# Patient Record
Sex: FEMALE | Race: WHITE | NOT HISPANIC OR LATINO | Employment: OTHER | URBAN - METROPOLITAN AREA
[De-identification: names, ages, dates, MRNs, and addresses within clinical notes are randomized per-mention and may not be internally consistent; named-entity substitution may affect disease eponyms.]

---

## 2017-01-19 ENCOUNTER — HISTORICAL (OUTPATIENT)
Dept: ADMINISTRATIVE | Facility: HOSPITAL | Age: 72
End: 2017-01-19

## 2017-01-19 LAB
ALBUMIN SERPL-MCNC: 3.9 G/DL (ref 3.1–4.7)
ALP SERPL-CCNC: 85 IU/L (ref 40–104)
ALT (SGPT): 18 IU/L (ref 3–33)
AST SERPL-CCNC: 22 IU/L (ref 10–40)
BASOPHILS NFR BLD: 0.1 K/UL (ref 0–0.2)
BASOPHILS NFR BLD: 0.7 %
BILIRUB SERPL-MCNC: 0.7 MG/DL (ref 0.3–1)
BUN SERPL-MCNC: 13 MG/DL (ref 8–20)
CALCIUM SERPL-MCNC: 8.8 MG/DL (ref 7.7–10.4)
CHLORIDE: 102 MMOL/L (ref 98–110)
CO2 SERPL-SCNC: 27.6 MMOL/L (ref 22.8–31.6)
CREATININE: 0.55 MG/DL (ref 0.6–1.4)
CRP SERPL-MCNC: 0.16 MG/DL (ref 0–1.4)
EOSINOPHIL NFR BLD: 0.2 K/UL (ref 0–0.7)
EOSINOPHIL NFR BLD: 3.1 %
ERYTHROCYTE [DISTWIDTH] IN BLOOD BY AUTOMATED COUNT: 12.4 % (ref 11.7–14.9)
GLUCOSE: 143 MG/DL (ref 70–99)
GRAN #: 3.6 K/UL (ref 1.4–6.5)
GRAN%: 52.7 %
HCT VFR BLD AUTO: 42.6 % (ref 36–48)
HGB BLD-MCNC: 13.8 G/DL (ref 12–15)
IMMATURE GRANS (ABS): 0 K/UL (ref 0–1)
IMMATURE GRANULOCYTES: 0.3 %
LYMPH #: 2.4 K/UL (ref 1.2–3.4)
LYMPH%: 35.1 %
MCH RBC QN AUTO: 29.8 PG (ref 25–35)
MCHC RBC AUTO-ENTMCNC: 32.4 G/DL (ref 31–36)
MCV RBC AUTO: 92 FL (ref 79–98)
MONO #: 0.6 K/UL (ref 0.1–0.6)
MONO%: 8.1 %
NUCLEATED RBCS: 0 %
PLATELET # BLD AUTO: 200 K/UL (ref 140–440)
PMV BLD AUTO: 10.7 FL (ref 8.8–12.7)
POTASSIUM SERPL-SCNC: 3.7 MMOL/L (ref 3.5–5)
PROT SERPL-MCNC: 7.6 G/DL (ref 6–8.2)
RBC # BLD AUTO: 4.63 M/UL (ref 3.5–5.5)
SODIUM: 139 MMOL/L (ref 134–144)
WBC # BLD AUTO: 6.8 K/UL (ref 5–10)

## 2017-02-06 DIAGNOSIS — J30.9 CHRONIC ALLERGIC RHINITIS: ICD-10-CM

## 2017-02-07 RX ORDER — MONTELUKAST SODIUM 10 MG/1
10 TABLET ORAL NIGHTLY
Qty: 90 TABLET | Refills: 1 | Status: SHIPPED | OUTPATIENT
Start: 2017-02-07 | End: 2019-04-10 | Stop reason: SDUPTHER

## 2017-02-13 ENCOUNTER — HISTORICAL (OUTPATIENT)
Dept: ADMINISTRATIVE | Facility: HOSPITAL | Age: 72
End: 2017-02-13

## 2017-02-17 DIAGNOSIS — J31.0 RHINITIS, UNSPECIFIED TYPE: ICD-10-CM

## 2017-02-17 RX ORDER — FLUTICASONE PROPIONATE 50 MCG
2 SPRAY, SUSPENSION (ML) NASAL DAILY
Qty: 48 G | Refills: 3 | Status: SHIPPED | OUTPATIENT
Start: 2017-02-17

## 2017-02-23 ENCOUNTER — PATIENT MESSAGE (OUTPATIENT)
Dept: FAMILY MEDICINE | Facility: CLINIC | Age: 72
End: 2017-02-23

## 2017-03-07 ENCOUNTER — DOCUMENTATION ONLY (OUTPATIENT)
Dept: FAMILY MEDICINE | Facility: CLINIC | Age: 72
End: 2017-03-07

## 2017-03-07 NOTE — PROGRESS NOTES
Pre-Visit Chart Review  For Appointment Scheduled on 3-8-17    Health Maintenance Due   Topic Date Due    TETANUS VACCINE  10/31/1962    Zoster Vaccine  10/31/2004    Pneumococcal (65+) (2 of 2 - PCV13) 10/04/2013

## 2017-03-08 ENCOUNTER — OFFICE VISIT (OUTPATIENT)
Dept: FAMILY MEDICINE | Facility: CLINIC | Age: 72
End: 2017-03-08
Payer: MEDICARE

## 2017-03-08 VITALS
WEIGHT: 205.69 LBS | DIASTOLIC BLOOD PRESSURE: 70 MMHG | SYSTOLIC BLOOD PRESSURE: 148 MMHG | HEART RATE: 63 BPM | BODY MASS INDEX: 40.38 KG/M2 | TEMPERATURE: 98 F | HEIGHT: 60 IN

## 2017-03-08 DIAGNOSIS — R53.83 FATIGUE, UNSPECIFIED TYPE: ICD-10-CM

## 2017-03-08 DIAGNOSIS — Z23 NEED FOR VACCINATION WITH 13-POLYVALENT PNEUMOCOCCAL CONJUGATE VACCINE: ICD-10-CM

## 2017-03-08 DIAGNOSIS — Z13.29 THYROID DISORDER SCREEN: ICD-10-CM

## 2017-03-08 DIAGNOSIS — E55.9 VITAMIN D DEFICIENCY: ICD-10-CM

## 2017-03-08 DIAGNOSIS — E78.5 HYPERLIPIDEMIA, UNSPECIFIED HYPERLIPIDEMIA TYPE: ICD-10-CM

## 2017-03-08 DIAGNOSIS — Z83.49 FAMILY HISTORY OF THYROID DISEASE: ICD-10-CM

## 2017-03-08 DIAGNOSIS — I10 ESSENTIAL HYPERTENSION: Primary | ICD-10-CM

## 2017-03-08 DIAGNOSIS — M06.9 RHEUMATOID ARTHRITIS, INVOLVING UNSPECIFIED SITE, UNSPECIFIED RHEUMATOID FACTOR PRESENCE: ICD-10-CM

## 2017-03-08 PROCEDURE — 99214 OFFICE O/P EST MOD 30 MIN: CPT | Mod: S$PBB,,, | Performed by: FAMILY MEDICINE

## 2017-03-08 PROCEDURE — 99213 OFFICE O/P EST LOW 20 MIN: CPT | Mod: PBBFAC,PO,25 | Performed by: FAMILY MEDICINE

## 2017-03-08 PROCEDURE — 99999 PR PBB SHADOW E&M-EST. PATIENT-LVL III: CPT | Mod: PBBFAC,,, | Performed by: FAMILY MEDICINE

## 2017-03-08 PROCEDURE — 90670 PCV13 VACCINE IM: CPT | Mod: PBBFAC,PO | Performed by: FAMILY MEDICINE

## 2017-03-08 RX ORDER — TIZANIDINE 4 MG/1
TABLET ORAL
COMMUNITY
Start: 2017-01-19 | End: 2017-09-07

## 2017-03-08 RX ORDER — CHOLECALCIFEROL (VITAMIN D3) 125 MCG
5000 CAPSULE ORAL DAILY
COMMUNITY

## 2017-03-08 RX ORDER — CHLORTHALIDONE 25 MG/1
25 TABLET ORAL DAILY
Qty: 90 TABLET | Refills: 3 | Status: SHIPPED | OUTPATIENT
Start: 2017-03-08 | End: 2017-03-23

## 2017-03-08 RX ORDER — GARLIC 1000 MG
CAPSULE ORAL
COMMUNITY
End: 2018-03-08

## 2017-03-08 NOTE — MR AVS SNAPSHOT
[facility]  [address]  [place] [address]  Phone: [phone]  Fax: [phone]                  Ling Rapp   3/8/2017 2:00 PM   Office Visit    Description:  Female : 1945   Provider:  Rosa M Chan MD   Department:  [facility]           Reason for Visit     Establish Care           Diagnoses this Visit        Comments    Essential hypertension    -  Primary     Obesity, Class II, BMI 35-39.9, with comorbidity         Hyperlipidemia, unspecified hyperlipidemia type         Vitamin D deficiency         Rheumatoid arthritis, involving unspecified site, unspecified rheumatoid factor presence         Family history of thyroid disease         Thyroid disorder screen         Fatigue, unspecified type         Need for vaccination with 13-polyvalent pneumococcal conjugate vaccine                To Do List           Future Appointments        Provider Department Dept Phone    3/9/2017 8:45 AM [facility] [phone]    3/14/2017 9:45 AM [facility] MAMMO1 [facility] [phone]    3/14/2017 10:15 AM [facility] US2 [facility] [phone]    3/20/2017 11:20 AM Rosa M Chan MD [facility] [phone]    2017 8:40 AM Rosa M Chan MD [facility] [phone]      Goals (5 Years of Data)     None      Follow-Up and Disposition     Return in about 6 months (around 2017).       These Medications        Disp Refills Start End    chlorthalidone (HYGROTEN) 25 MG Tab 90 tablet 3 3/8/2017 3/8/2018    Take 1 tablet (25 mg total) by mouth once daily. - Oral    Pharmacy: [facility] - [place], [place] - [address] Ph #: [phone]         [facility]     [facility] -  Assistance  Registered nurses in the [facility] provide clinical advisement, health education, appointment booking, and other  advisory services.  Call for this free service at [phone].             Medications           Message regarding Medications     Verify the changes and/or additions to your medication regime listed below are the same as discussed with your clinician today.  If any of these changes or additions are incorrect, please notify your healthcare provider.        START taking these NEW medications        Refills    chlorthalidone (HYGROTEN) 25 MG Tab 3    Sig: Take 1 tablet (25 mg total) by mouth once daily.    Class: Normal    Route: Oral      STOP taking these medications     hydrochlorothiazide (MICROZIDE) 12.5 mg capsule TAKE 1 CAPSULE IN THE MORNING FOR FLUID           Verify that the below list of medications is an accurate representation of the medications you are currently taking.  If none reported, the list may be blank. If incorrect, please contact your healthcare provider. Carry this list with you in case of emergency.           Current Medications     albuterol (PROAIR HFA) 90 mcg/actuation inhaler Inhale 2 puffs into the lungs every 6 (six) hours as needed for Wheezing.    artificial saliva, yerbas-lyt, SprA Use as directed    aspirin 81 mg Tab Every day    atenolol (TENORMIN) 100 MG tablet TAKE 1 TABLET DAILY    azelastine (ASTELIN) 137 mcg nasal spray 1 spray by Nasal route 2 (two) times daily.    beclomethasone (QVAR) 80 mcg/actuation Aero Inhale 1 puff into the lungs 2 (two) times daily.    CALCIUM CARBONATE/VITAMIN D3 (OS-ADDY 500 + D) 500-125 mg-unit Tab Twice a day    cetirizine (ZYRTEC) 10 MG tablet Every day    cholecalciferol, vitamin D3, 5,000 unit capsule Take 5,000 Units by mouth once daily. 5 a week    cyanocobalamin (VITAMIN B-12) 100 MCG tablet Take 100 mcg by mouth once daily.    diclofenac sodium (VOLTAREN) 1 % Gel Apply 2 g topically once daily.    etanercept (ENBREL) 50 mg/mL (0.98 mL) injection once weekly    fluticasone (FLONASE) 50 mcg/actuation nasal spray 2 sprays by Each Nare route  once daily. Every day    garlic (GARLIC OIL) 1,000 mg Cap Take by mouth.    Lactobacillus rhamnosus GG (CULTURELLE) 10 billion cell capsule Take 1 capsule by mouth once daily.    montelukast (SINGULAIR) 10 mg tablet Take 1 tablet (10 mg total) by mouth every evening.    omeprazole (PRILOSEC) 40 MG capsule TAKE 1 CAPSULE TWICE DAILY BEFORE MEALS    salsalate (DISALCID) 500 MG tablet 500 mg daily as needed. Twice a day    tizanidine (ZANAFLEX) 4 MG tablet     tramadol (ULTRAM) 50 mg tablet Take 50 mg by mouth every 6 (six) hours as needed.     UBIDECARENONE (COQ-10 ORAL) Take by mouth.    chlorthalidone (HYGROTEN) 25 MG Tab Take 1 tablet (25 mg total) by mouth once daily.    triamcinolone acetonide 0.5% (KENALOG) 0.5 % Crea Apply topically 2 (two) times daily.           Clinical Reference Information           Your Vitals Were     BP Pulse Temp Height Weight BMI    148/70 (BP Location: Right arm, Patient Position: Sitting, BP Method: Manual) 63 97.7 °F (36.5 °C) (Oral) 5' (1.524 m) 93.3 kg (205 lb 11 oz) 40.17 kg/m2      Blood Pressure          Most Recent Value    BP  (!)  148/70      Allergies as of 3/8/2017     Amoxicillin-pot Clavulanate    Diltiazem    Fenofibrate    Hydroxychloroquine    Leflunomide    Lisinopril-hydrochlorothiazide    Methotrexate    Norvasc [Amlodipine]    Sulfa (Sulfonamide Antibiotics)      Immunizations Administered on Date of Encounter - 3/8/2017     Name Date Dose VIS Date Route    Pneumococcal Conjugate - 13 Valent 3/8/2017 0.5 mL 11/5/2015 Intramuscular      Orders Placed During Today's Visit      Normal Orders This Visit    Pneumococcal Conjugate Vaccine (13 Valent) (IM)     Future Labs/Procedures Expected by Expires    CBC auto differential  3/9/2017 (Approximate) 6/8/2017    Comprehensive metabolic panel  3/9/2017 (Approximate) 6/8/2017    Lipid panel  3/9/2017 (Approximate) 6/8/2017    TSH  3/9/2017 (Approximate) 6/8/2017    Vitamin D  3/9/2017 (Approximate) 6/8/2017       Instructions      Walking for Fitness  Fitness walking has something for everyone, even people who are already fit. Walking is one of the safest ways to condition your body aerobically. It can boost energy, help you lose weight, and reduce stress.    Physical benefits  · Walking strengthens your heart and lungs, and tones your muscles.  · When walking, your feet land with less impact than in other sports. This reduces chances of muscle, bone, and joint injury.  · Regular walking improves your cholesterol levels and lowers your risk of heart disease. And it helps you control your blood sugar if you have diabetes.  · Walking is a weight-bearing activity, which helps maintain bone density. This can help prevent osteoporosis.  Personal rewards  · Taking walks can help you relax and manage stress. And fitness walking may make you feel better about yourself.  · Walking can help you sleep better at night and make you less likely to be depressed.  · Regular walking may help maintain your memory as you get older.  · Walking is a great way to spend extra time with friends and family members. Be sure to invite your dog along!  Q&A about fitness walking  Q: Will walking keep me fit?  A: Yes. Regular walking at the right pace gives you all the benefits of other aerobic activities, such as jogging and swimming.  Q: Will walking help me lose weight and keep it off?  A: Yes. Per mile, walking can burn as many calories as jogging. Your health care provider can help work walking into your weight-loss plan.  Q: Is walking safe for my health?  A: Yes. Walking is safe if you have high blood pressure, diabetes, heart disease, or other conditions. Talk to your health care provider before you start.  Date Last Reviewed: 5/9/2015  © 1968-2088 [employer]. [address] [place] [address], [place] [address]. All rights reserved. This information is not intended as a substitute for professional medical care. Always follow your  healthcare professional's instructions.        Weight Management: Getting Started  Healthy bodies come in all shapes and sizes. Not all bodies are made to be thin. For some people, a healthy weight is higher than the average weight listed on weight charts. Your healthcare provider can help you decide on a healthy weight for you.    Reasons to lose weight  Losing weight can help with some health problems, such as high blood pressure, heart disease, diabetes, sleep apnea, and arthritis. You may also feel more energy.  Set your long-term goal  Your goal doesn't even have to be a specific weight. You may decide on a fitness goal (such as being able to walk 10 miles a week), or a health goal (such as lowering your blood pressure). Choose a goal that is measurable and reasonable, so you know when you've reached it. A goal of reaching a BMI of less than 25 is not always reasonable (or possible).   Make an action plan  Habits dont change overnight. Setting your goals too high can leave you feeling discouraged if you cant reach them. Be realistic. Choose one or two small changes you can make now. Set an action plan for how you are going to make these changes. When you can stick to this plan, keep making a few more small changes. Taking small steps will help you stay on the path to success.  Track your progress  Write down your goals. Then, keep a daily record of your progress. Write down what you eat and how active you are. This record lets you look back on how much youve done. It may also help when youre feeling frustrated. Reward yourself for success. Even if you dont reach every goal, give yourself credit for what you do get done.  Get support  Encouragement from others can help make losing weight easier. Ask your family members and friends for support. They may even want to join you. Also look to your healthcare provider, registered dietitian, and  for help. Your local hospital can give you more  information about nutrition, exercise, and weight loss.  Date Last Reviewed: 1/31/2016  © 4515-0064 [employer]. [address] [place] [address], [place] [address]. All rights reserved. This information is not intended as a substitute for professional medical care. Always follow your healthcare professional's instructions.             Language Assistance Services     ATTENTION: Language assistance services are available, free of charge. Please call [phone].      ATENCIÓN: Si habla annamarie, tiene a mar disposición servicios gratuitos de asistencia lingüística. Llame al [phone].     CHÚ Ý: N?u b?n nói Ti?ng Vi?t, có các d?ch v? h? tr? ngôn ng? mi?n phí dành cho b?n. G?i s? [phone].         [facility] complies with applicable Federal civil rights laws and does not discriminate on the basis of race, color, national origin, age, disability, or sex.

## 2017-03-08 NOTE — PATIENT INSTRUCTIONS

## 2017-03-08 NOTE — NURSING
2 patient identifiers used (name & ). Administered Prevnar vaccine IM. Patient tolerated well, no bleeding at insertion site noted. Pain scale 0/10. Aseptic technique maintained. Vaccine information given to patient.

## 2017-03-09 ENCOUNTER — LAB VISIT (OUTPATIENT)
Dept: LAB | Facility: HOSPITAL | Age: 72
End: 2017-03-09
Attending: FAMILY MEDICINE
Payer: MEDICARE

## 2017-03-09 DIAGNOSIS — Z83.49 FAMILY HISTORY OF THYROID DISEASE: ICD-10-CM

## 2017-03-09 DIAGNOSIS — E78.5 HYPERLIPIDEMIA, UNSPECIFIED HYPERLIPIDEMIA TYPE: ICD-10-CM

## 2017-03-09 DIAGNOSIS — Z13.29 THYROID DISORDER SCREEN: ICD-10-CM

## 2017-03-09 DIAGNOSIS — M06.9 RHEUMATOID ARTHRITIS, INVOLVING UNSPECIFIED SITE, UNSPECIFIED RHEUMATOID FACTOR PRESENCE: ICD-10-CM

## 2017-03-09 DIAGNOSIS — R53.83 FATIGUE, UNSPECIFIED TYPE: ICD-10-CM

## 2017-03-09 DIAGNOSIS — E55.9 VITAMIN D DEFICIENCY: ICD-10-CM

## 2017-03-09 LAB
25(OH)D3+25(OH)D2 SERPL-MCNC: 27 NG/ML
ALBUMIN SERPL BCP-MCNC: 3.7 G/DL
ALP SERPL-CCNC: 94 U/L
ALT SERPL W/O P-5'-P-CCNC: 17 U/L
ANION GAP SERPL CALC-SCNC: 9 MMOL/L
AST SERPL-CCNC: 22 U/L
BASOPHILS # BLD AUTO: 0.05 K/UL
BASOPHILS NFR BLD: 0.6 %
BILIRUB SERPL-MCNC: 0.9 MG/DL
BUN SERPL-MCNC: 19 MG/DL
CALCIUM SERPL-MCNC: 9.3 MG/DL
CHLORIDE SERPL-SCNC: 105 MMOL/L
CHOLEST/HDLC SERPL: 4.8 {RATIO}
CO2 SERPL-SCNC: 26 MMOL/L
CREAT SERPL-MCNC: 0.8 MG/DL
DIFFERENTIAL METHOD: ABNORMAL
EOSINOPHIL # BLD AUTO: 0.3 K/UL
EOSINOPHIL NFR BLD: 3.3 %
ERYTHROCYTE [DISTWIDTH] IN BLOOD BY AUTOMATED COUNT: 13.4 %
EST. GFR  (AFRICAN AMERICAN): >60 ML/MIN/1.73 M^2
EST. GFR  (NON AFRICAN AMERICAN): >60 ML/MIN/1.73 M^2
GLUCOSE SERPL-MCNC: 96 MG/DL
HCT VFR BLD AUTO: 45.5 %
HDL/CHOLESTEROL RATIO: 20.9 %
HDLC SERPL-MCNC: 201 MG/DL
HDLC SERPL-MCNC: 42 MG/DL
HGB BLD-MCNC: 14.1 G/DL
LDLC SERPL CALC-MCNC: 103 MG/DL
LYMPHOCYTES # BLD AUTO: 3 K/UL
LYMPHOCYTES NFR BLD: 36.2 %
MCH RBC QN AUTO: 28.8 PG
MCHC RBC AUTO-ENTMCNC: 31 %
MCV RBC AUTO: 93 FL
MONOCYTES # BLD AUTO: 0.8 K/UL
MONOCYTES NFR BLD: 9.2 %
NEUTROPHILS # BLD AUTO: 4.1 K/UL
NEUTROPHILS NFR BLD: 50.5 %
NONHDLC SERPL-MCNC: 159 MG/DL
PLATELET # BLD AUTO: 196 K/UL
PMV BLD AUTO: 10.7 FL
POTASSIUM SERPL-SCNC: 4.4 MMOL/L
PROT SERPL-MCNC: 8 G/DL
RBC # BLD AUTO: 4.9 M/UL
SODIUM SERPL-SCNC: 140 MMOL/L
TRIGL SERPL-MCNC: 280 MG/DL
TSH SERPL DL<=0.005 MIU/L-ACNC: 1.5 UIU/ML
WBC # BLD AUTO: 8.16 K/UL

## 2017-03-09 PROCEDURE — 80061 LIPID PANEL: CPT

## 2017-03-09 PROCEDURE — 82306 VITAMIN D 25 HYDROXY: CPT

## 2017-03-09 PROCEDURE — 80053 COMPREHEN METABOLIC PANEL: CPT

## 2017-03-09 PROCEDURE — 84443 ASSAY THYROID STIM HORMONE: CPT

## 2017-03-09 PROCEDURE — 85025 COMPLETE CBC W/AUTO DIFF WBC: CPT

## 2017-03-09 PROCEDURE — 36415 COLL VENOUS BLD VENIPUNCTURE: CPT | Mod: PO

## 2017-03-13 NOTE — PROGRESS NOTES
Subjective:       Patient ID: Ling Rapp is a 71 y.o. female.    Chief Complaint: Establish Care    Patient Active Problem List   Diagnosis    Left foot pain    Vitamin D deficiency    Hyperlipidemia    HTN (hypertension)    Rheumatoid arthritis    Asthma    Chronic allergic rhinitis    Obesity, Class II, BMI 35-39.9, with comorbidity    Bronchial breath sound    Congestive atelectasis    Venous insufficiency    Rectal bleeding    Lung nodule   Dr. Schultz is treating neck pain.  Had c spine xray [facility] 2/13/17 showed arthritis amd MRI 2/21/17 showed multi level DDD and fusion C4-5 .  Having tension headahces and shoulder pain keeping her awake at night.  See neurology Dr. Sheffield 3/20.  IS attending PT and transitioning to home exercises.    Dr. Devries is monitoring pulm nodule    HPI  Review of Systems   Constitutional: Negative for fatigue and unexpected weight change.   Respiratory: Negative for chest tightness and shortness of breath.    Cardiovascular: Negative for chest pain, palpitations and leg swelling.   Gastrointestinal: Negative for abdominal pain.   Musculoskeletal: Positive for arthralgias, neck pain and neck stiffness.   Neurological: Negative for dizziness, syncope, light-headedness and headaches.       Objective:      Physical Exam   Constitutional: She is oriented to person, place, and time. She appears well-developed and well-nourished.   Cardiovascular: Normal rate, regular rhythm and normal heart sounds.    Pulmonary/Chest: Effort normal and breath sounds normal.   Musculoskeletal: She exhibits no edema.   Neurological: She is alert and oriented to person, place, and time.   Skin: Skin is warm and dry.   Psychiatric: She has a normal mood and affect.   Nursing note and vitals reviewed.      Assessment:       1. Essential hypertension    2. Obesity, Class II, BMI 35-39.9, with comorbidity    3. Hyperlipidemia, unspecified hyperlipidemia type    4. Vitamin D deficiency    5.  Rheumatoid arthritis, involving unspecified site, unspecified rheumatoid factor presence    6. Family history of thyroid disease    7. Thyroid disorder screen    8. Fatigue, unspecified type    9. Need for vaccination with 13-polyvalent pneumococcal conjugate vaccine        Plan:       1. Essential hypertension  Uncontrolled. I counseled the patient on HTN education, management and recommendations.  I recommended weight loss toward a BMI < 25, avoidance of salt and the DASH diet, regular cardio exercise a minimum of 150 minutes per week and medications if indicated.  Printed materials were given.      2. Obesity, Class II, BMI 35-39.9, with comorbidity      3. Hyperlipidemia, unspecified hyperlipidemia type  Counseled patient on his ideal body weight, health consequences of being obese and current recommendations including weekly exercise and a heart healthy diet.  Current BMI is:Estimated body mass index is 40.17 kg/(m^2) as calculated from the following:    Height as of this encounter: 5' (1.524 m).    Weight as of this encounter: 93.3 kg (205 lb 11 oz)..  Patient is aware that ideal BMI < 25 or Weight in (lb) to have BMI = 25: 127.7.      - Comprehensive metabolic panel; Future  - Lipid panel; Future    4. Vitamin D deficiency  Screen and treat as indicated:    - Vitamin D; Future    5. Rheumatoid arthritis, involving unspecified site, unspecified rheumatoid factor presence  Cont rheum care and meds.  Cont treatment for neck ortho issues with pt and neurology  - CBC auto differential; Future    6. Family history of thyroid disease  Screen and treat as indicated:    - TSH; Future    7. Thyroid disorder screen  Screen and treat as indicated:    - TSH; Future    8. Fatigue, unspecified type  Screen and treat as indicated:    - TSH; Future    9. Need for vaccination with 13-polyvalent pneumococcal conjugate vaccine  Immunize today.  Counseled patient on risks, benefits and side effects.  Patient elected to proceed  with vaccination.    [facility] goal documentation:  Patient readiness: acceptance and barriers:readiness  During the course of the visit the patient was educated and counseled about the following: Hypertension:   Dietary sodium restriction.  Regular aerobic exercise.  Obesity:   General weight loss/lifestyle modification strategies discussed (elicit support from others; identify saboteurs; non-food rewards, etc).  Goals: Hypertension: Reduce Blood Pressure and Obesity: Reduce calorie intake and BMI  Goal/Outcomes met:none  The following self management tools provided:none  Patient Instructions (the written plan) was given to the patient/family: Yes  Time spent with patient: 40 minutes    Patient with be reevaluated in 6 months or sooner prn.  Re[eat BP check nurse visit 3/14/17    Greater than 50% of this visit was spent counseling as described in above documentation:Yes      - Pneumococcal Conjugate Vaccine (13 Valent) (IM)

## 2017-03-14 ENCOUNTER — TELEPHONE (OUTPATIENT)
Dept: FAMILY MEDICINE | Facility: CLINIC | Age: 72
End: 2017-03-14

## 2017-03-14 ENCOUNTER — TELEPHONE (OUTPATIENT)
Dept: RADIOLOGY | Facility: HOSPITAL | Age: 72
End: 2017-03-14

## 2017-03-14 ENCOUNTER — HOSPITAL ENCOUNTER (OUTPATIENT)
Dept: RADIOLOGY | Facility: HOSPITAL | Age: 72
Discharge: HOME | End: 2017-03-14
Attending: NURSE PRACTITIONER
Payer: MEDICARE

## 2017-03-14 DIAGNOSIS — R92.8 ABNORMAL MAMMOGRAM: ICD-10-CM

## 2017-03-14 PROCEDURE — 77061 BREAST TOMOSYNTHESIS UNI: CPT | Mod: 26,LT,, | Performed by: RADIOLOGY

## 2017-03-14 PROCEDURE — 76642 ULTRASOUND BREAST LIMITED: CPT | Mod: TC,LT

## 2017-03-14 PROCEDURE — 76642 ULTRASOUND BREAST LIMITED: CPT | Mod: 26,LT,, | Performed by: RADIOLOGY

## 2017-03-14 PROCEDURE — 77061 BREAST TOMOSYNTHESIS UNI: CPT | Mod: TC,LT

## 2017-03-14 PROCEDURE — 77065 DX MAMMO INCL CAD UNI: CPT | Mod: 26,LT,, | Performed by: RADIOLOGY

## 2017-03-14 NOTE — TELEPHONE ENCOUNTER
----- Message from Kimberly Simmons sent at 3/14/2017 11:08 AM CDT -----  Contact:   call //[phone]   dr barber   Calling   To  Speak to  mara  About   Pt  ,    Was  Informed  Nurse  Sarika,  Stated   mara  wasn't t  In  Today , so  Doctor  Wanted a  Call  When   She  Returned

## 2017-03-14 NOTE — TELEPHONE ENCOUNTER
..Patient notified of diagnostic mammogram results Needs left breast biopsy appointment is scheduled with Dr. Mcguire on 3/16/2017.

## 2017-03-16 ENCOUNTER — OFFICE VISIT (OUTPATIENT)
Dept: SURGERY | Facility: CLINIC | Age: 72
End: 2017-03-16
Payer: MEDICARE

## 2017-03-16 VITALS — TEMPERATURE: 98 F | BODY MASS INDEX: 39.96 KG/M2 | WEIGHT: 204.56 LBS

## 2017-03-16 DIAGNOSIS — R92.8 ABNORMAL MAMMOGRAM: Primary | ICD-10-CM

## 2017-03-16 PROCEDURE — 99999 PR PBB SHADOW E&M-EST. PATIENT-LVL II: CPT | Mod: PBBFAC,,, | Performed by: SURGERY

## 2017-03-16 PROCEDURE — 99204 OFFICE O/P NEW MOD 45 MIN: CPT | Mod: S$PBB,,, | Performed by: SURGERY

## 2017-03-16 PROCEDURE — 99212 OFFICE O/P EST SF 10 MIN: CPT | Mod: PBBFAC,PO | Performed by: SURGERY

## 2017-03-16 RX ORDER — LANOLIN ALCOHOL/MO/W.PET/CERES
400 CREAM (GRAM) TOPICAL DAILY
COMMUNITY

## 2017-03-16 NOTE — LETTER
March 19, 2017      [facility]  [address]  [place] [address]           [facility] [facility]  [address]  [place] [address]  Phone: [phone]          Patient: Ling Rapp   MR Number: [id number]   YOB: 1945   Date of Visit: 3/16/2017       Dear [facility]:    Thank you for referring Ling Rapp to me for evaluation. Attached you will find relevant portions of my assessment and plan of care.    If you have questions, please do not hesitate to call me. I look forward to following Ling Rapp along with you.    Sincerely,    Mani Mcguire MD    Enclosure  CC:  No Recipients    If you would like to receive this communication electronically, please contact [email or web address] or [phone] to request more information on [employer] access.    For providers and/or their staff who would like to refer a patient to [facility], please contact us through our one-stop-shop provider referral line, [facility], at [phone].    If you feel you have received this communication in error or would no longer like to receive these types of communications, please e-mail [email or web address]

## 2017-03-19 NOTE — PROGRESS NOTES
Subjective:       Patient ID: Ling Rapp is a 71 y.o. female.    Chief Complaint: Consult (left breast biopsy)    HPI 71-year-old female presents for evaluation for a left breast biopsy.  The patient says she is sensitive breast but otherwise has had no symptoms.  She's had no previous biopsies.  She is not sure if she can feel the mass.  Family history is negative for breast or ovarian cancer.    Review of Systems   Constitutional: Negative for activity change, chills, fever and unexpected weight change.   HENT: Negative for congestion, hearing loss, sore throat and voice change.    Eyes: Negative.    Respiratory: Negative for cough, shortness of breath and wheezing.    Cardiovascular: Negative for chest pain and palpitations.   Gastrointestinal: Negative for abdominal pain, blood in stool, nausea and vomiting.   Genitourinary: Negative for dysuria, frequency and hematuria.   Musculoskeletal: Negative for arthralgias, back pain and neck pain.   Skin: Negative for color change and wound.   Allergic/Immunologic: Negative.    Neurological: Negative for dizziness, weakness and headaches.   Hematological: Negative for adenopathy. Does not bruise/bleed easily.   Psychiatric/Behavioral: Negative for confusion and dysphoric mood. The patient is not nervous/anxious.      Objective:     Physical Exam   Constitutional: She is oriented to person, place, and time. She appears well-developed and well-nourished. No distress.   HENT:   Head: Normocephalic and atraumatic.   Eyes: Conjunctivae and EOM are normal. Pupils are equal, round, and reactive to light. No scleral icterus.   Neck: Normal range of motion. Neck supple. No thyromegaly present.   Cardiovascular: Normal rate, regular rhythm, normal heart sounds and intact distal pulses.    No murmur heard.  Pulmonary/Chest: No stridor. No respiratory distress. She has no wheezes. She has no rales.   Breast exam: There is no palpable dominant mass.  There is some mild  tenderness.  There is no nipple discharge.  There is no palpable axillary adenopathy.  There are no overlying skin changes.   Abdominal: She exhibits no distension and no mass. There is no tenderness.   Musculoskeletal: Normal range of motion. She exhibits no edema.   Lymphadenopathy:     She has no cervical adenopathy.   Neurological: She is alert and oriented to person, place, and time. No cranial nerve deficit. She exhibits normal muscle tone.   Skin: Skin is warm and dry. No rash noted. No erythema.   Psychiatric: She has a normal mood and affect. Her behavior is normal. Judgment normal.     Mammogram and ultrasound images and reports reviewed.    Assessment:     Encounter Diagnosis   Name Primary?    Abnormal mammogram Yes       Plan:      1.  Plan ultrasound-guided biopsy of this left breast mass.  2. Risks and benefits of the planned procedure were discussed at length with the patient.  Risks and benefits of not proceeding with the procedure were discussed as well. All questions were answered. The patient expressed clear understanding and would like to proceed with the procedure as discussed.

## 2017-03-20 ENCOUNTER — CLINICAL SUPPORT (OUTPATIENT)
Dept: FAMILY MEDICINE | Facility: CLINIC | Age: 72
End: 2017-03-20
Payer: MEDICARE

## 2017-03-20 ENCOUNTER — TELEPHONE (OUTPATIENT)
Dept: FAMILY MEDICINE | Facility: CLINIC | Age: 72
End: 2017-03-20

## 2017-03-20 VITALS — DIASTOLIC BLOOD PRESSURE: 62 MMHG | SYSTOLIC BLOOD PRESSURE: 142 MMHG

## 2017-03-20 DIAGNOSIS — I10 ESSENTIAL HYPERTENSION: Primary | ICD-10-CM

## 2017-03-20 NOTE — PROGRESS NOTES
Patient here today for a BP recheck. Was to have stopped HCTZ 12.5 mg daily and started chlorthalidone 25 mg daily on 3/8/17  Per Dr Chan but did not do this. Patient stated that after that appointment she realized she had not been taking HCTZ for approximately 2 months so she decided to simply restart this medication instead of the chlorthalidone. In addition, the chlorthalidone was $77 per month which she cannot afford.  BP readings today were 158/66 and after resting 5 minutes 142/62. Both readings were done manually. Telephone message was sent ms Billyar to address.

## 2017-03-20 NOTE — TELEPHONE ENCOUNTER
Patient came in today for a BP recheck. Was to have stopped HCTZ 12.5 mg daily and started chlorthalidone 25 mg daily on 3/8/17 per Dr Chan but did not do this. Patient stated that after that appointment she realized she had not been taking HCTZ for approximately 2 months so she decided to simply restart this medication instead of the chlorthalidone. In addition, the chlorthalidone was $77 per month which she cannot afford.  BP readings today were 158/66 and after resting 5 minutes 142/62. Both readings were done manually.  Please advise.

## 2017-03-23 NOTE — TELEPHONE ENCOUNTER
Advised patient to increase hctz to 25 mg daily instead and I removed chlorthalidone from the medication list. Please call her tomorrow (Friday) and schedule nursing BP check in 2 weeks.

## 2017-03-30 ENCOUNTER — HOSPITAL ENCOUNTER (OUTPATIENT)
Dept: RADIOLOGY | Facility: HOSPITAL | Age: 72
Discharge: HOME | End: 2017-03-30
Attending: SURGERY
Payer: MEDICARE

## 2017-03-30 DIAGNOSIS — R92.8 ABNORMAL MAMMOGRAM: ICD-10-CM

## 2017-03-30 DIAGNOSIS — R91.1 LUNG NODULE: Primary | ICD-10-CM

## 2017-03-30 PROCEDURE — 19083 BX BREAST 1ST LESION US IMAG: CPT

## 2017-03-30 PROCEDURE — 88305 TISSUE EXAM BY PATHOLOGIST: CPT | Mod: 26,,, | Performed by: PATHOLOGY

## 2017-03-30 PROCEDURE — 19083 BX BREAST 1ST LESION US IMAG: CPT | Mod: LT,,, | Performed by: SURGERY

## 2017-03-30 PROCEDURE — 88305 TISSUE EXAM BY PATHOLOGIST: CPT | Performed by: PATHOLOGY

## 2017-03-30 RX ORDER — LIDOCAINE HYDROCHLORIDE AND EPINEPHRINE 10; 10 MG/ML; UG/ML
5 INJECTION, SOLUTION INFILTRATION; PERINEURAL ONCE
Status: DISCONTINUED | OUTPATIENT
Start: 2017-03-30 | End: 2017-03-31 | Stop reason: HOSPADM

## 2017-03-30 RX ORDER — LIDOCAINE HYDROCHLORIDE AND EPINEPHRINE 10; 10 MG/ML; UG/ML
INJECTION, SOLUTION INFILTRATION; PERINEURAL
Status: DISPENSED
Start: 2017-03-30 | End: 2017-03-30

## 2017-03-30 NOTE — DISCHARGE SUMMARY
Discharge Summary  General Surgery      Admit Date: 3/30/2017    Discharge Date :3/30/2017    Attending Physician: Mani Mcguire     Discharge Physician: Mani Mcguire    Discharged Condition: good    Discharge Diagnosis: Left breast mass    Treatments/Procedures: US guided left breast biopsy    Hospital Course: Uneventful; Discharged home.    Significant Diagnostic Studies: none    Disposition: Home or Self Care    Diet: Regular    Follow up: Office 10-14 days    Activity: No heavy lifting till seen in office.    Patient Instructions:   Patient's Medications   New Prescriptions    No medications on file   Previous Medications    ALBUTEROL (PROAIR HFA) 90 MCG/ACTUATION INHALER    Inhale 2 puffs into the lungs every 6 (six) hours as needed for Wheezing.    ARTIFICIAL SALIVA, YERBAS-LYT, SPRA    Use as directed    ASPIRIN 81 MG TAB    Every day    ATENOLOL (TENORMIN) 100 MG TABLET    TAKE 1 TABLET DAILY    AZELASTINE (ASTELIN) 137 MCG NASAL SPRAY    1 spray by Nasal route 2 (two) times daily.    BECLOMETHASONE (QVAR) 80 MCG/ACTUATION AERO    Inhale 1 puff into the lungs 2 (two) times daily.    CALCIUM CARBONATE/VITAMIN D3 (OS-ADDY 500 + D) 500-125 MG-UNIT TAB    Twice a day    CETIRIZINE (ZYRTEC) 10 MG TABLET    Every day    CHOLECALCIFEROL, VITAMIN D3, 5,000 UNIT CAPSULE    Take 5,000 Units by mouth once daily. 5 a week    CYANOCOBALAMIN (VITAMIN B-12) 100 MCG TABLET    Take 100 mcg by mouth once daily.    DICLOFENAC SODIUM (VOLTAREN) 1 % GEL    Apply 2 g topically once daily.    ETANERCEPT (ENBREL) 50 MG/ML (0.98 ML) INJECTION    once weekly    FLUTICASONE (FLONASE) 50 MCG/ACTUATION NASAL SPRAY    2 sprays by Each Nare route once daily. Every day    GARLIC (GARLIC OIL) 1,000 MG CAP    Take by mouth.    LACTOBACILLUS RHAMNOSUS GG (CULTURELLE) 10 BILLION CELL CAPSULE    Take 1 capsule by mouth once daily.    MAGNESIUM OXIDE (MAG-OX) 400 MG TABLET    Take 400 mg by mouth once daily.    MONTELUKAST (SINGULAIR) 10 MG  TABLET    Take 1 tablet (10 mg total) by mouth every evening.    OMEPRAZOLE (PRILOSEC) 40 MG CAPSULE    TAKE 1 CAPSULE TWICE DAILY BEFORE MEALS    SALSALATE (DISALCID) 500 MG TABLET    500 mg daily as needed. Twice a day    TIZANIDINE (ZANAFLEX) 4 MG TABLET        TRAMADOL (ULTRAM) 50 MG TABLET    Take 50 mg by mouth every 6 (six) hours as needed.     TRIAMCINOLONE ACETONIDE 0.5% (KENALOG) 0.5 % CREA    Apply topically 2 (two) times daily.    UBIDECARENONE (COQ-10 ORAL)    Take by mouth.   Modified Medications    No medications on file   Discontinued Medications    No medications on file       No discharge procedures on file.

## 2017-03-31 ENCOUNTER — DOCUMENTATION ONLY (OUTPATIENT)
Dept: FAMILY MEDICINE | Facility: CLINIC | Age: 72
End: 2017-03-31

## 2017-03-31 NOTE — PROGRESS NOTES
Pre-Visit Chart Review  For Appointment Scheduled on 04/07/2017    Health Maintenance Due   Topic Date Due    TETANUS VACCINE  10/31/1962    Zoster Vaccine  10/31/2004

## 2017-03-31 NOTE — PATIENT INSTRUCTIONS
Controlling High Blood Pressure  High blood pressure (hypertension) is often called the silent killer. This is because many people who have it dont know it. High blood pressure is defined as 140/90 mm Hg or higher. Know your blood pressure and remember to check it regularly. Doing so can save your life. Here are some things you can do to help control your blood pressure.    Choose heart-healthy foods  · Select low-salt, low-fat foods. Limit sodium intake to 2,400 mg per day or the amount suggested by your healthcare provider.  · Limit canned, dried, cured, packaged, and fast foods. These can contain a lot of salt.  · Eat 8 to 10 servings of fruits and vegetables every day.  · Choose lean meats, fish, or chicken.  · Eat whole-grain pasta, brown rice, and beans.  · Eat 2 to 3 servings of low-fat or fat-free dairy products.  · Ask your doctor about the DASH eating plan. This plan helps reduce blood pressure.  · When you go to a restaurant, ask that your meal be prepared with no added salt.  Maintain a healthy weight  · Ask your healthcare provider how many calories to eat a day. Then stick to that number.  · Ask your healthcare provider what weight range is healthiest for you. If you are overweight, a weight loss of only 3% to 5% of your body weight can help lower blood pressure. Generally, a good weight loss goal is to lose 10% of your body weight in a year.  · Limit snacks and sweets.  · Get regular exercise.  Get up and get active  · Choose activities you enjoy. Find ones you can do with friends or family. This includes bicycling, dancing, walking, and jogging.  · Park farther away from building entrances.  · Use stairs instead of the elevator.  · When you can, walk or bike instead of driving.  · [place] leaves, garden, or do household repairs.  · Be active at a moderate to vigorous level of physical activity for at least 40 minutes for a minimum of 3 to 4 days a week.   Manage stress  · Make time to relax and enjoy  life. Find time to laugh.  · Communicate your concerns with your loved ones and your healthcare provider.  · Visit with family and friends, and keep up with hobbies.  Limit alcohol and quit smoking  · Men should have no more than 2 drinks per day.  · Women should have no more than 1 drink per day.  · Talk with your healthcare provider about quitting smoking. Smoking significantly increases your risk for heart disease and stroke. Ask your healthcare provider about community smoking cessation programs and other options.  Medicines  If lifestyle changes arent enough, your healthcare provider may prescribe high blood pressure medicine. Take all medicines as prescribed. If you have any questions about your medicines, ask your healthcare provider before stopping or changing them.   Date Last Reviewed: 4/27/2016 © 2000-2016 [employer]. [address] [place] [address], [place] [address]. All rights reserved. This information is not intended as a substitute for professional medical care. Always follow your healthcare professional's instructions.        Weight Management: Getting Started  Healthy bodies come in all shapes and sizes. Not all bodies are made to be thin. For some people, a healthy weight is higher than the average weight listed on weight charts. Your healthcare provider can help you decide on a healthy weight for you.    Reasons to lose weight  Losing weight can help with some health problems, such as high blood pressure, heart disease, diabetes, sleep apnea, and arthritis. You may also feel more energy.  Set your long-term goal  Your goal doesn't even have to be a specific weight. You may decide on a fitness goal (such as being able to walk 10 miles a week), or a health goal (such as lowering your blood pressure). Choose a goal that is measurable and reasonable, so you know when you've reached it. A goal of reaching a BMI of less than 25 is not always reasonable (or possible).   Make an action  plan  Habits dont change overnight. Setting your goals too high can leave you feeling discouraged if you cant reach them. Be realistic. Choose one or two small changes you can make now. Set an action plan for how you are going to make these changes. When you can stick to this plan, keep making a few more small changes. Taking small steps will help you stay on the path to success.  Track your progress  Write down your goals. Then, keep a daily record of your progress. Write down what you eat and how active you are. This record lets you look back on how much youve done. It may also help when youre feeling frustrated. Reward yourself for success. Even if you dont reach every goal, give yourself credit for what you do get done.  Get support  Encouragement from others can help make losing weight easier. Ask your family members and friends for support. They may even want to join you. Also look to your healthcare provider, registered dietitian, and  for help. Your local hospital can give you more information about nutrition, exercise, and weight loss.  Date Last Reviewed: 1/31/2016 © 2000-2016 [employer]. [address] [place] [address]. All rights reserved. This information is not intended as a substitute for professional medical care. Always follow your healthcare professional's instructions.        Walking for Fitness  Fitness walking has something for everyone, even people who are already fit. Walking is one of the safest ways to condition your body aerobically. It can boost energy, help you lose weight, and reduce stress.    Physical benefits  · Walking strengthens your heart and lungs, and tones your muscles.  · When walking, your feet land with less impact than in other sports. This reduces chances of muscle, bone, and joint injury.  · Regular walking improves your cholesterol levels and lowers your risk of heart disease. And it helps you control your blood sugar if  you have diabetes.  · Walking is a weight-bearing activity, which helps maintain bone density. This can help prevent osteoporosis.  Personal rewards  · Taking walks can help you relax and manage stress. And fitness walking may make you feel better about yourself.  · Walking can help you sleep better at night and make you less likely to be depressed.  · Regular walking may help maintain your memory as you get older.  · Walking is a great way to spend extra time with friends and family members. Be sure to invite your dog along!  Q&A about fitness walking  Q: Will walking keep me fit?  A: Yes. Regular walking at the right pace gives you all the benefits of other aerobic activities, such as jogging and swimming.  Q: Will walking help me lose weight and keep it off?  A: Yes. Per mile, walking can burn as many calories as jogging. Your health care provider can help work walking into your weight-loss plan.  Q: Is walking safe for my health?  A: Yes. Walking is safe if you have high blood pressure, diabetes, heart disease, or other conditions. Talk to your health care provider before you start.  Date Last Reviewed: 5/9/2015  © 2216-5003 [employer]. [address] [place] [address], [place] [address]. All rights reserved. This information is not intended as a substitute for professional medical care. Always follow your healthcare professional's instructions.

## 2017-04-06 ENCOUNTER — TELEPHONE (OUTPATIENT)
Dept: RADIOLOGY | Facility: HOSPITAL | Age: 72
End: 2017-04-06

## 2017-04-06 NOTE — TELEPHONE ENCOUNTER
..Patient notified of breast biopsy results.     FINAL PATHOLOGIC DIAGNOSIS  LEFT BREAST, 3:00 CM FROM NIPPLE, BIOPSY:  -Benign breast tissue with fibrocystic changes and usual ductal hyperplasia.  -No atypia or malignancy.  -Microcalcifications are present.    Instructed to repeat diagnostic mammogram in 3 months

## 2017-04-07 ENCOUNTER — CLINICAL SUPPORT (OUTPATIENT)
Dept: FAMILY MEDICINE | Facility: CLINIC | Age: 72
End: 2017-04-07
Payer: MEDICARE

## 2017-04-07 ENCOUNTER — TELEPHONE (OUTPATIENT)
Dept: FAMILY MEDICINE | Facility: CLINIC | Age: 72
End: 2017-04-07

## 2017-04-07 VITALS — DIASTOLIC BLOOD PRESSURE: 62 MMHG | SYSTOLIC BLOOD PRESSURE: 150 MMHG | HEART RATE: 66 BPM

## 2017-04-07 DIAGNOSIS — I10 ESSENTIAL HYPERTENSION: Primary | ICD-10-CM

## 2017-04-07 PROCEDURE — 99999 PR PBB SHADOW E&M-EST. PATIENT-LVL III: CPT | Mod: PBBFAC,,, | Performed by: PHYSICIAN ASSISTANT

## 2017-04-07 PROCEDURE — 99213 OFFICE O/P EST LOW 20 MIN: CPT | Mod: PBBFAC,PO | Performed by: PHYSICIAN ASSISTANT

## 2017-04-07 NOTE — PROGRESS NOTES
2 patient identifiers used ( name &  ).  Patient came in for nurse blood pressure check.  Automatic reading was 157/68  . Right arm resting on desk, feet flat on floor, back straight.  Patient had no c/o pain.  Patient stated that hey took their prescribed blood pressure medication this am.  Allergies and medication reviewed with the patient.  Blood pressure re-checked after 5 minutes with manual cuff, reading was 150/62 .  Patient advised to continue taking medication as prescribed and follow up as scheduled.  Patient advised that message will be sent to the provider for recommendations and we will call with the reply.

## 2017-04-20 ENCOUNTER — OFFICE VISIT (OUTPATIENT)
Dept: SURGERY | Facility: CLINIC | Age: 72
End: 2017-04-20
Payer: MEDICARE

## 2017-04-20 ENCOUNTER — HISTORICAL (OUTPATIENT)
Dept: ADMINISTRATIVE | Facility: HOSPITAL | Age: 72
End: 2017-04-20

## 2017-04-20 VITALS — TEMPERATURE: 98 F

## 2017-04-20 DIAGNOSIS — R92.8 ABNORMAL MAMMOGRAM: Primary | ICD-10-CM

## 2017-04-20 LAB
ALBUMIN SERPL-MCNC: 3.8 G/DL (ref 3.1–4.7)
ALP SERPL-CCNC: 83 IU/L (ref 40–104)
ALT (SGPT): 18 IU/L (ref 3–33)
AST SERPL-CCNC: 19 IU/L (ref 10–40)
BASOPHILS NFR BLD: 0.1 K/UL (ref 0–0.2)
BASOPHILS NFR BLD: 0.7 %
BILIRUB SERPL-MCNC: 1 MG/DL (ref 0.3–1)
BUN SERPL-MCNC: 14 MG/DL (ref 8–20)
CALCIUM SERPL-MCNC: 8.8 MG/DL (ref 7.7–10.4)
CHLORIDE: 99 MMOL/L (ref 98–110)
CO2 SERPL-SCNC: 28.2 MMOL/L (ref 22.8–31.6)
CREATININE: 0.58 MG/DL (ref 0.6–1.4)
CRP SERPL-MCNC: 0.16 MG/DL (ref 0–1.4)
EOSINOPHIL NFR BLD: 0.2 K/UL (ref 0–0.7)
EOSINOPHIL NFR BLD: 2.6 %
ERYTHROCYTE [DISTWIDTH] IN BLOOD BY AUTOMATED COUNT: 12.8 % (ref 11.7–14.9)
GLUCOSE: 99 MG/DL (ref 70–99)
GRAN #: 4.6 K/UL (ref 1.4–6.5)
GRAN%: 53 %
HCT VFR BLD AUTO: 43.3 % (ref 36–48)
HGB BLD-MCNC: 14.3 G/DL (ref 12–15)
IMMATURE GRANS (ABS): 0 K/UL (ref 0–1)
IMMATURE GRANULOCYTES: 0.2 %
LYMPH #: 2.8 K/UL (ref 1.2–3.4)
LYMPH%: 32.8 %
MCH RBC QN AUTO: 29.7 PG (ref 25–35)
MCHC RBC AUTO-ENTMCNC: 33 G/DL (ref 31–36)
MCV RBC AUTO: 89.8 FL (ref 79–98)
MONO #: 0.9 K/UL (ref 0.1–0.6)
MONO%: 10.7 %
NUCLEATED RBCS: 0 %
PLATELET # BLD AUTO: 205 K/UL (ref 140–440)
PMV BLD AUTO: 10.2 FL (ref 8.8–12.7)
POTASSIUM SERPL-SCNC: 3.5 MMOL/L (ref 3.5–5)
PROT SERPL-MCNC: 7.9 G/DL (ref 6–8.2)
RBC # BLD AUTO: 4.82 M/UL (ref 3.5–5.5)
SODIUM: 136 MMOL/L (ref 134–144)
WBC # BLD AUTO: 8.6 K/UL (ref 5–10)

## 2017-04-20 PROCEDURE — 99999 PR PBB SHADOW E&M-EST. PATIENT-LVL II: CPT | Mod: PBBFAC,,, | Performed by: SURGERY

## 2017-04-20 PROCEDURE — 99024 POSTOP FOLLOW-UP VISIT: CPT | Mod: ,,, | Performed by: SURGERY

## 2017-04-20 PROCEDURE — 99212 OFFICE O/P EST SF 10 MIN: CPT | Mod: PBBFAC,PO | Performed by: SURGERY

## 2017-04-21 NOTE — PROGRESS NOTES
POST-OP NOTE    DATE OF PROCEDURE: 3/30/17    PROCEDURE: Left breast biopsy    COMPLAINTS: None.    EXAM: Incision well approximated. No drainage. No infection.      IMPRESSION: FINAL PATHOLOGIC DIAGNOSIS  LEFT BREAST, 3:00 CM FROM NIPPLE, BIOPSY:  -Benign breast tissue with fibrocystic changes and usual ductal hyperplasia.  -No atypia or malignancy.  -Microcalcifications are present.    PLAN: FU as needed.  3 month mammogram.

## 2017-07-20 ENCOUNTER — OFFICE VISIT (OUTPATIENT)
Dept: RHEUMATOLOGY | Facility: CLINIC | Age: 72
End: 2017-07-20
Payer: MEDICARE

## 2017-07-20 VITALS
DIASTOLIC BLOOD PRESSURE: 61 MMHG | BODY MASS INDEX: 41.23 KG/M2 | WEIGHT: 210 LBS | HEIGHT: 60 IN | SYSTOLIC BLOOD PRESSURE: 145 MMHG

## 2017-07-20 DIAGNOSIS — M05.79 SEROPOSITIVE RHEUMATOID ARTHRITIS OF MULTIPLE SITES: Primary | ICD-10-CM

## 2017-07-20 LAB
ALBUMIN SERPL-MCNC: 3.9 G/DL (ref 3.1–4.7)
ALP SERPL-CCNC: 73 IU/L (ref 40–104)
ALT (SGPT): 19 IU/L (ref 3–33)
AST SERPL-CCNC: 18 IU/L (ref 10–40)
BASOPHILS NFR BLD: 0.1 K/UL (ref 0–0.2)
BASOPHILS NFR BLD: 0.8 %
BILIRUB SERPL-MCNC: 0.8 MG/DL (ref 0.3–1)
BUN SERPL-MCNC: 16 MG/DL (ref 8–20)
CALCIUM SERPL-MCNC: 8.7 MG/DL (ref 7.7–10.4)
CHLORIDE: 105 MMOL/L (ref 98–110)
CO2 SERPL-SCNC: 29.3 MMOL/L (ref 22.8–31.6)
CREATININE: 0.53 MG/DL (ref 0.6–1.4)
CRP SERPL-MCNC: 0.17 MG/DL (ref 0–1.4)
EOSINOPHIL NFR BLD: 0.3 K/UL (ref 0–0.7)
EOSINOPHIL NFR BLD: 3.7 %
ERYTHROCYTE [DISTWIDTH] IN BLOOD BY AUTOMATED COUNT: 12.8 % (ref 11.7–14.9)
GLUCOSE: 133 MG/DL (ref 70–99)
GRAN #: 4 K/UL (ref 1.4–6.5)
GRAN%: 52.9 %
HCT VFR BLD AUTO: 42.1 % (ref 36–48)
HGB BLD-MCNC: 13.8 G/DL (ref 12–15)
IMMATURE GRANS (ABS): 0 K/UL (ref 0–1)
IMMATURE GRANULOCYTES: 0.3 %
LYMPH #: 2.3 K/UL (ref 1.2–3.4)
LYMPH%: 30.6 %
MCH RBC QN AUTO: 30.5 PG (ref 25–35)
MCHC RBC AUTO-ENTMCNC: 32.8 G/DL (ref 31–36)
MCV RBC AUTO: 93.1 FL (ref 79–98)
MONO #: 0.9 K/UL (ref 0.1–0.6)
MONO%: 11.7 %
NUCLEATED RBCS: 0 %
PLATELET # BLD AUTO: 182 K/UL (ref 140–440)
PMV BLD AUTO: 10.4 FL (ref 8.8–12.7)
POTASSIUM SERPL-SCNC: 4 MMOL/L (ref 3.5–5)
PROT SERPL-MCNC: 7.5 G/DL (ref 6–8.2)
RBC # BLD AUTO: 4.52 M/UL (ref 3.5–5.5)
SODIUM: 139 MMOL/L (ref 134–144)
WBC # BLD AUTO: 7.6 K/UL (ref 5–10)

## 2017-07-20 PROCEDURE — 1125F AMNT PAIN NOTED PAIN PRSNT: CPT | Mod: ,,, | Performed by: INTERNAL MEDICINE

## 2017-07-20 PROCEDURE — 1159F MED LIST DOCD IN RCRD: CPT | Mod: ,,, | Performed by: INTERNAL MEDICINE

## 2017-07-20 PROCEDURE — 99213 OFFICE O/P EST LOW 20 MIN: CPT | Mod: ,,, | Performed by: INTERNAL MEDICINE

## 2017-07-20 NOTE — PROGRESS NOTES
[facility] RHEUMATOLOGY            PROGRESS NOTE      Subjective:       Patient ID:   NAME: Ling Rapp : 1945     71 y.o. female    Referring Doc: No ref. provider found  Other Physicians:    Chief Complaint:  Rheumatoid Arthritis (follow-up no changes since last visit)      History of Present Illness:     Patient returns today for a regularly scheduled follow-up visit for Rheumatoid arthritis      The patient is doing well. No prolonged morning stiffness or fatigue. No joint swelling.  Main complaint is that of neck pain which been treated by her pain management physician with recent epidural injections.            ROS:   GEN:  No  fever, night sweats . weight is stable   No fatigue  SKIN: no rashes, no bruising, no ulcerations, no Raynaud's  HEENT: no HA's, No visual changes, no mucosal ulcers, no sicca symptoms,  CV:   no CP,occ SOB, PND, SEN, no orthopnea, no palpitations  PULM: normal with occ  SOB, and cough treated with inhalers, no hemoptysis, sputum or pleuritic pain  GI:  no abdominal pain, nausea, vomiting, constipation, diarrhea, melanotic stools, BRBPR, hematemesis, no dysphagia  :   no dysuria  NEURO: no paresthesias, headaches, visual disturbances, muscle weakness  MUSCULOSKELETAL:no joint swelling, prolonged AM stiffness, no back pain, no muscle pain  Allergies:  Review of patient's allergies indicates:   Allergen Reactions    Amoxicillin-pot clavulanate Diarrhea and Other (See Comments)     Sever cramping    Diltiazem Other (See Comments)     Extreme dry moth    Fenofibrate Other (See Comments)     Other reaction(s): Itching    Hydroxychloroquine      Other reaction(s): eye accomodation problems    Leflunomide      Other reaction(s): abd pains    Lisinopril-hydrochlorothiazide      Other reaction(s): diarrhea  Other reaction(s): cramps    Methotrexate      Other reaction(s): pancreatitis    Norvasc [amlodipine] Swelling    Sulfa (sulfonamide antibiotics)      Other  reaction(s): Swelling  Other reaction(s): Hives    Sulfamethoxazole-trimethoprim        Medications:    Current Outpatient Prescriptions:     albuterol (PROAIR HFA) 90 mcg/actuation inhaler, Inhale 2 puffs into the lungs every 6 (six) hours as needed for Wheezing., Disp: 3 Inhaler, Rfl: 5    artificial saliva, yerbas-lyt, SprA, Use as directed, Disp: 240 mL, Rfl: 11    aspirin 81 mg Tab, Every day, Disp: , Rfl:     atenolol (TENORMIN) 100 MG tablet, TAKE 1 TABLET DAILY, Disp: 90 tablet, Rfl: 2    azelastine (ASTELIN) 137 mcg nasal spray, 1 spray by Nasal route 2 (two) times daily., Disp: , Rfl:     CALCIUM CARBONATE/VITAMIN D3 (OS-ADDY 500 + D) 500-125 mg-unit Tab, Twice a day, Disp: , Rfl:     cetirizine (ZYRTEC) 10 MG tablet, Every day, Disp: , Rfl:     cholecalciferol, vitamin D3, 5,000 unit capsule, Take 5,000 Units by mouth once daily. 5 a week, Disp: , Rfl:     cyanocobalamin (VITAMIN B-12) 100 MCG tablet, Take 100 mcg by mouth once daily., Disp: , Rfl:     diclofenac sodium (VOLTAREN) 1 % Gel, Apply 2 g topically once daily., Disp: , Rfl:     etanercept (ENBREL) 50 mg/mL (0.98 mL) injection, once weekly, Disp: , Rfl:     fluticasone (FLONASE) 50 mcg/actuation nasal spray, 2 sprays by Each Nare route once daily. Every day, Disp: 48 g, Rfl: 3    garlic (GARLIC OIL) 1,000 mg Cap, Take by mouth., Disp: , Rfl:     Lactobacillus rhamnosus GG (CULTURELLE) 10 billion cell capsule, Take 1 capsule by mouth once daily., Disp: , Rfl:     magnesium oxide (MAG-OX) 400 mg tablet, Take 400 mg by mouth once daily., Disp: , Rfl:     montelukast (SINGULAIR) 10 mg tablet, Take 1 tablet (10 mg total) by mouth every evening., Disp: 90 tablet, Rfl: 1    omeprazole (PRILOSEC) 40 MG capsule, TAKE 1 CAPSULE TWICE DAILY BEFORE MEALS, Disp: 180 capsule, Rfl: 2    salsalate (DISALCID) 500 MG tablet, 500 mg daily as needed. Twice a day, Disp: , Rfl:     tizanidine (ZANAFLEX) 4 MG tablet, , Disp: , Rfl:     tramadol  (ULTRAM) 50 mg tablet, Take 50 mg by mouth every 6 (six) hours as needed. , Disp: , Rfl:     UBIDECARENONE (COQ-10 ORAL), Take by mouth., Disp: , Rfl:     beclomethasone (QVAR) 80 mcg/actuation Aero, Inhale 1 puff into the lungs 2 (two) times daily., Disp: 26.1 g, Rfl: 3    triamcinolone acetonide 0.5% (KENALOG) 0.5 % Crea, Apply topically 2 (two) times daily., Disp: 15 g, Rfl: 2    PMHx/PSHx Updates:  Last TB Gold.January 2017: negative    Objective:     Vitals:  Blood pressure (!) 145/61, height 5' (1.524 m), weight 95.3 kg (210 lb).    Physical Examination:   GEN: no apparent distress, comfortable; AAOx3  SKIN: no rashes,no ulceration, no Raynaud's, no petechiae, no SQ nodules,  HEAD: normal  EYES: no pallor, no icterus,NECK: no masses, thyroid normal, trachea midline, no LAD/LN's, supple  CV: RRR with no murmur; l S1 and S2 reg. ,no gallop no rubs,   CHEST: Normal respiratory effort; CTAB; normal breath sounds; no wheeze or crackles  ABDOM: nontender and nondistended; soft; no masses; no rebound/guarding  MUSC/Skeletal: ROM normal; no crepitus; joints without synovitis,  Heberden's and Irvin's.  No joint swelling or tenderness of PIP, MCP, wrist, elbow, shoulder, or knee joints  EXTREM: no clubbing, cyanosis, no edema,normal  pulses   NEURO: grossly intact; motor WNL; AAOx3; PSYCH: normal mood, affect and behavior  LYMPH: normal cervical, supraclavicular          Labs:   Lab Results   Component Value Date    WBC 8.6 04/20/2017    HGB 14.3 04/20/2017    HCT 43.3 04/20/2017    MCV 89.8 04/20/2017     04/20/2017    CMP  @LASTLAB(NA,K,CL,CO2,GLU,BUN,Creatinine,Calcium,PROT,Albumin,Bilitot,Alkphos,AST,ALT,CRP,ESR,RF,CCP,VANNA,SSA,CPK,uric acid) )@  I have reviewed all available lab results and radiology reports.    Radiology/Diagnostic Studies:        Assessment/Plan:   (1) 71 y.o. female with diagnosis of Myxoid arthritis and osteoarthritis.  She is doing well.      CBC, CMP, CRP          Discussion:      I have explained all of the above in detail and the patient understands all of the current recommendation(s). I have answered all questions to the best of my ability and to their complete satisfaction.       The patient is to continue with the current management plan         RTC in  4 months      Electronically signed by Manjinder Schultz MD

## 2017-07-31 ENCOUNTER — HOSPITAL ENCOUNTER (OUTPATIENT)
Dept: RADIOLOGY | Facility: HOSPITAL | Age: 72
Discharge: HOME | End: 2017-07-31
Attending: SURGERY
Payer: MEDICARE

## 2017-07-31 VITALS — WEIGHT: 210 LBS | BODY MASS INDEX: 41.23 KG/M2 | HEIGHT: 60 IN

## 2017-07-31 DIAGNOSIS — R92.8 ABNORMAL MAMMOGRAM OF LEFT BREAST: ICD-10-CM

## 2017-07-31 PROCEDURE — 77061 BREAST TOMOSYNTHESIS UNI: CPT | Mod: 26,LT,, | Performed by: RADIOLOGY

## 2017-07-31 PROCEDURE — 77065 DX MAMMO INCL CAD UNI: CPT | Mod: 26,LT,, | Performed by: RADIOLOGY

## 2017-07-31 PROCEDURE — 76642 ULTRASOUND BREAST LIMITED: CPT | Mod: 26,LT,, | Performed by: RADIOLOGY

## 2017-07-31 PROCEDURE — 76642 ULTRASOUND BREAST LIMITED: CPT | Mod: TC,LT

## 2017-07-31 PROCEDURE — 77061 BREAST TOMOSYNTHESIS UNI: CPT | Mod: TC,LT

## 2017-09-06 ENCOUNTER — DOCUMENTATION ONLY (OUTPATIENT)
Dept: FAMILY MEDICINE | Facility: CLINIC | Age: 72
End: 2017-09-06

## 2017-09-07 ENCOUNTER — OFFICE VISIT (OUTPATIENT)
Dept: FAMILY MEDICINE | Facility: CLINIC | Age: 72
End: 2017-09-07
Payer: MEDICARE

## 2017-09-07 VITALS
DIASTOLIC BLOOD PRESSURE: 78 MMHG | WEIGHT: 206.38 LBS | TEMPERATURE: 98 F | HEIGHT: 60 IN | BODY MASS INDEX: 40.52 KG/M2 | HEART RATE: 58 BPM | SYSTOLIC BLOOD PRESSURE: 158 MMHG

## 2017-09-07 DIAGNOSIS — E55.9 VITAMIN D DEFICIENCY: ICD-10-CM

## 2017-09-07 DIAGNOSIS — R73.9 HYPERGLYCEMIA: ICD-10-CM

## 2017-09-07 DIAGNOSIS — E78.5 HYPERLIPIDEMIA, UNSPECIFIED HYPERLIPIDEMIA TYPE: Primary | ICD-10-CM

## 2017-09-07 DIAGNOSIS — I10 ESSENTIAL HYPERTENSION: ICD-10-CM

## 2017-09-07 PROCEDURE — 99999 PR PBB SHADOW E&M-EST. PATIENT-LVL III: CPT | Mod: PBBFAC,,, | Performed by: FAMILY MEDICINE

## 2017-09-07 PROCEDURE — 3077F SYST BP >= 140 MM HG: CPT | Mod: ,,, | Performed by: FAMILY MEDICINE

## 2017-09-07 PROCEDURE — 99214 OFFICE O/P EST MOD 30 MIN: CPT | Mod: S$PBB,,, | Performed by: FAMILY MEDICINE

## 2017-09-07 PROCEDURE — 99213 OFFICE O/P EST LOW 20 MIN: CPT | Mod: PBBFAC,PO | Performed by: FAMILY MEDICINE

## 2017-09-07 PROCEDURE — 1159F MED LIST DOCD IN RCRD: CPT | Mod: ,,, | Performed by: FAMILY MEDICINE

## 2017-09-07 PROCEDURE — 1126F AMNT PAIN NOTED NONE PRSNT: CPT | Mod: ,,, | Performed by: FAMILY MEDICINE

## 2017-09-07 PROCEDURE — 3078F DIAST BP <80 MM HG: CPT | Mod: ,,, | Performed by: FAMILY MEDICINE

## 2017-09-07 RX ORDER — LISINOPRIL 10 MG/1
10 TABLET ORAL DAILY
Qty: 90 TABLET | Refills: 3 | Status: SHIPPED | OUTPATIENT
Start: 2017-09-07 | End: 2017-10-17

## 2017-09-07 NOTE — PATIENT INSTRUCTIONS
Controlling High Blood Pressure  High blood pressure (hypertension) is often called the silent killer. This is because many people who have it dont know it. High blood pressure is defined as 140/90 mm Hg or higher. Know your blood pressure and remember to check it regularly. Doing so can save your life. Here are some things you can do to help control your blood pressure.    Choose heart-healthy foods  · Select low-salt, low-fat foods. Limit sodium intake to 2,400 mg per day or the amount suggested by your healthcare provider.  · Limit canned, dried, cured, packaged, and fast foods. These can contain a lot of salt.  · Eat 8 to 10 servings of fruits and vegetables every day.  · Choose lean meats, fish, or chicken.  · Eat whole-grain pasta, brown rice, and beans.  · Eat 2 to 3 servings of low-fat or fat-free dairy products.  · Ask your doctor about the DASH eating plan. This plan helps reduce blood pressure.  · When you go to a restaurant, ask that your meal be prepared with no added salt.  Maintain a healthy weight  · Ask your healthcare provider how many calories to eat a day. Then stick to that number.  · Ask your healthcare provider what weight range is healthiest for you. If you are overweight, a weight loss of only 3% to 5% of your body weight can help lower blood pressure. Generally, a good weight loss goal is to lose 10% of your body weight in a year.  · Limit snacks and sweets.  · Get regular exercise.  Get up and get active  · Choose activities you enjoy. Find ones you can do with friends or family. This includes bicycling, dancing, walking, and jogging.  · Park farther away from building entrances.  · Use stairs instead of the elevator.  · When you can, walk or bike instead of driving.  · [place] leaves, garden, or do household repairs.  · Be active at a moderate to vigorous level of physical activity for at least 40 minutes for a minimum of 3 to 4 days a week.   Manage stress  · Make time to relax and enjoy  life. Find time to laugh.  · Communicate your concerns with your loved ones and your healthcare provider.  · Visit with family and friends, and keep up with hobbies.  Limit alcohol and quit smoking  · Men should have no more than 2 drinks per day.  · Women should have no more than 1 drink per day.  · Talk with your healthcare provider about quitting smoking. Smoking significantly increases your risk for heart disease and stroke. Ask your healthcare provider about community smoking cessation programs and other options.  Medicines  If lifestyle changes arent enough, your healthcare provider may prescribe high blood pressure medicine. Take all medicines as prescribed. If you have any questions about your medicines, ask your healthcare provider before stopping or changing them.   Date Last Reviewed: 4/27/2016 © 2000-2016 [employer]. [address] [place] [address], [place] [address]. All rights reserved. This information is not intended as a substitute for professional medical care. Always follow your healthcare professional's instructions.        Walking for Fitness  Fitness walking has something for everyone, even people who are already fit. Walking is one of the safest ways to condition your body aerobically. It can boost energy, help you lose weight, and reduce stress.    Physical benefits  · Walking strengthens your heart and lungs, and tones your muscles.  · When walking, your feet land with less impact than in other sports. This reduces chances of muscle, bone, and joint injury.  · Regular walking improves your cholesterol levels and lowers your risk of heart disease. And it helps you control your blood sugar if you have diabetes.  · Walking is a weight-bearing activity, which helps maintain bone density. This can help prevent osteoporosis.  Personal rewards  · Taking walks can help you relax and manage stress. And fitness walking may make you feel better about yourself.  · Walking can help you sleep  better at night and make you less likely to be depressed.  · Regular walking may help maintain your memory as you get older.  · Walking is a great way to spend extra time with friends and family members. Be sure to invite your dog along!  Q&A about fitness walking  Q: Will walking keep me fit?  A: Yes. Regular walking at the right pace gives you all the benefits of other aerobic activities, such as jogging and swimming.  Q: Will walking help me lose weight and keep it off?  A: Yes. Per mile, walking can burn as many calories as jogging. Your health care provider can help work walking into your weight-loss plan.  Q: Is walking safe for my health?  A: Yes. Walking is safe if you have high blood pressure, diabetes, heart disease, or other conditions. Talk to your health care provider before you start.  Date Last Reviewed: 5/9/2015  © 4288-5909 [employer]. [address] [place] [address]. All rights reserved. This information is not intended as a substitute for professional medical care. Always follow your healthcare professional's instructions.        Weight Management: Getting Started  Healthy bodies come in all shapes and sizes. Not all bodies are made to be thin. For some people, a healthy weight is higher than the average weight listed on weight charts. Your healthcare provider can help you decide on a healthy weight for you.    Reasons to lose weight  Losing weight can help with some health problems, such as high blood pressure, heart disease, diabetes, sleep apnea, and arthritis. You may also feel more energy.  Set your long-term goal  Your goal doesn't even have to be a specific weight. You may decide on a fitness goal (such as being able to walk 10 miles a week), or a health goal (such as lowering your blood pressure). Choose a goal that is measurable and reasonable, so you know when you've reached it. A goal of reaching a BMI of less than 25 is not always reasonable (or  possible).   Make an action plan  Habits dont change overnight. Setting your goals too high can leave you feeling discouraged if you cant reach them. Be realistic. Choose one or two small changes you can make now. Set an action plan for how you are going to make these changes. When you can stick to this plan, keep making a few more small changes. Taking small steps will help you stay on the path to success.  Track your progress  Write down your goals. Then, keep a daily record of your progress. Write down what you eat and how active you are. This record lets you look back on how much youve done. It may also help when youre feeling frustrated. Reward yourself for success. Even if you dont reach every goal, give yourself credit for what you do get done.  Get support  Encouragement from others can help make losing weight easier. Ask your family members and friends for support. They may even want to join you. Also look to your healthcare provider, registered dietitian, and  for help. Your local hospital can give you more information about nutrition, exercise, and weight loss.  Date Last Reviewed: 1/31/2016  © 1125-1503 [employer]. [address] [place] [address], [place] [address]. All rights reserved. This information is not intended as a substitute for professional medical care. Always follow your healthcare professional's instructions.

## 2017-09-07 NOTE — PROGRESS NOTES
Subjective:       Patient ID: Ling Rapp is a 71 y.o. female.    Chief Complaint: Follow-up    Patient Active Problem List   Diagnosis    Left foot pain    Vitamin D deficiency    Hyperlipidemia    HTN (hypertension)    Rheumatoid arthritis    Asthma    Chronic allergic rhinitis    Obesity, Class II, BMI 35-39.9, with comorbidity    Bronchial breath sound    Congestive atelectasis    Venous insufficiency    Rectal bleeding    Lung nodule    Hyperglycemia     HPI  Review of Systems   Constitutional: Negative for fatigue and unexpected weight change.   Respiratory: Negative for chest tightness and shortness of breath.    Cardiovascular: Negative for chest pain, palpitations and leg swelling.   Gastrointestinal: Negative for abdominal pain.   Musculoskeletal: Negative for arthralgias.   Neurological: Negative for dizziness, syncope, light-headedness and headaches.       Objective:      Physical Exam   Constitutional: She is oriented to person, place, and time. She appears well-developed and well-nourished.   Cardiovascular: Normal rate, regular rhythm and normal heart sounds.    Pulmonary/Chest: Effort normal and breath sounds normal.   Musculoskeletal: She exhibits no edema.   Neurological: She is alert and oriented to person, place, and time.   Skin: Skin is warm and dry.   Psychiatric: She has a normal mood and affect.   Nursing note and vitals reviewed.      Assessment:       1. Hyperlipidemia, unspecified hyperlipidemia type    2. Essential hypertension    3. Vitamin D deficiency    4. Obesity, Class II, BMI 35-39.9, with comorbidity    5. Hyperglycemia        Plan:       1. Hyperlipidemia, unspecified hyperlipidemia type  Stable condition.  Continue current medications.  Will adjust based on lab findings or if condition changes.    - Comprehensive metabolic panel; Future  - Lipid panel; Future    2. Essential hypertension  Uncontrolled.  Increase to  - CBC auto differential; Future  -  lisinopril 10 MG tablet; Take 1 tablet (10 mg total) by mouth once daily.  Dispense: 90 tablet; Refill: 3    3. Vitamin D deficiency  Screen and treat as indicated:    - Vitamin D; Future    4. Obesity, Class II, BMI 35-39.9, with comorbidity  Counseled patient on his ideal body weight, health consequences of being obese and current recommendations including weekly exercise and a heart healthy diet.  Current BMI is:Estimated body mass index is 40.3 kg/m² as calculated from the following:    Height as of this encounter: 5' (1.524 m).    Weight as of this encounter: 93.6 kg (206 lb 5.6 oz)..  Patient is aware that ideal BMI < 25 or Weight in (lb) to have BMI = 25: 127.7.        5. Hyperglycemia  Screen and treat as indicated:    - Hemoglobin A1c; Future    [facility] goal documentation:  Patient readiness: acceptance and barriers:readiness  During the course of the visit the patient was educated and counseled about the following: Hypertension:   Dietary sodium restriction.  Regular aerobic exercise.  Obesity:   General weight loss/lifestyle modification strategies discussed (elicit support from others; identify saboteurs; non-food rewards, etc).  Goals: Hypertension: Reduce Blood Pressure and Obesity: Reduce calorie intake and BMI  Goal/Outcomes met:Hypertension  The following self management tools provided:none  Patient Instructions (the written plan) was given to the patient/family: Yes  Time spent with patient: 20 minutes    Patient with be reevaluated in 6 months or sooner prn. 4 weeks nurse BP check    Greater than 50% of this visit was spent counseling as described in above documentation:Yes

## 2017-09-08 ENCOUNTER — LAB VISIT (OUTPATIENT)
Dept: LAB | Facility: HOSPITAL | Age: 72
End: 2017-09-08
Attending: FAMILY MEDICINE
Payer: MEDICARE

## 2017-09-08 DIAGNOSIS — E78.5 HYPERLIPIDEMIA, UNSPECIFIED HYPERLIPIDEMIA TYPE: ICD-10-CM

## 2017-09-08 DIAGNOSIS — I10 ESSENTIAL HYPERTENSION: ICD-10-CM

## 2017-09-08 DIAGNOSIS — R73.9 HYPERGLYCEMIA: ICD-10-CM

## 2017-09-08 DIAGNOSIS — E55.9 VITAMIN D DEFICIENCY: ICD-10-CM

## 2017-09-08 LAB
25(OH)D3+25(OH)D2 SERPL-MCNC: 31 NG/ML
ALBUMIN SERPL BCP-MCNC: 3.2 G/DL
ALP SERPL-CCNC: 90 U/L
ALT SERPL W/O P-5'-P-CCNC: 17 U/L
ANION GAP SERPL CALC-SCNC: 9 MMOL/L
AST SERPL-CCNC: 16 U/L
BASOPHILS # BLD AUTO: 0.05 K/UL
BASOPHILS NFR BLD: 0.6 %
BILIRUB SERPL-MCNC: 1 MG/DL
BUN SERPL-MCNC: 12 MG/DL
CALCIUM SERPL-MCNC: 9 MG/DL
CHLORIDE SERPL-SCNC: 104 MMOL/L
CHOLEST SERPL-MCNC: 177 MG/DL
CHOLEST/HDLC SERPL: 4.2 {RATIO}
CO2 SERPL-SCNC: 26 MMOL/L
CREAT SERPL-MCNC: 0.8 MG/DL
DIFFERENTIAL METHOD: NORMAL
EOSINOPHIL # BLD AUTO: 0.3 K/UL
EOSINOPHIL NFR BLD: 3.5 %
ERYTHROCYTE [DISTWIDTH] IN BLOOD BY AUTOMATED COUNT: 12.9 %
EST. GFR  (AFRICAN AMERICAN): >60 ML/MIN/1.73 M^2
EST. GFR  (NON AFRICAN AMERICAN): >60 ML/MIN/1.73 M^2
GLUCOSE SERPL-MCNC: 96 MG/DL
HCT VFR BLD AUTO: 41.9 %
HDLC SERPL-MCNC: 42 MG/DL
HDLC SERPL: 23.7 %
HGB BLD-MCNC: 13.7 G/DL
LDLC SERPL CALC-MCNC: 89.8 MG/DL
LYMPHOCYTES # BLD AUTO: 1.8 K/UL
LYMPHOCYTES NFR BLD: 21.7 %
MCH RBC QN AUTO: 30 PG
MCHC RBC AUTO-ENTMCNC: 32.7 G/DL
MCV RBC AUTO: 92 FL
MONOCYTES # BLD AUTO: 0.9 K/UL
MONOCYTES NFR BLD: 10.6 %
NEUTROPHILS # BLD AUTO: 5.2 K/UL
NEUTROPHILS NFR BLD: 63.4 %
NONHDLC SERPL-MCNC: 135 MG/DL
PLATELET # BLD AUTO: 193 K/UL
PMV BLD AUTO: 9.9 FL
POTASSIUM SERPL-SCNC: 4.2 MMOL/L
PROT SERPL-MCNC: 7.4 G/DL
RBC # BLD AUTO: 4.56 M/UL
SODIUM SERPL-SCNC: 139 MMOL/L
TRIGL SERPL-MCNC: 226 MG/DL
WBC # BLD AUTO: 8.22 K/UL

## 2017-09-08 PROCEDURE — 83036 HEMOGLOBIN GLYCOSYLATED A1C: CPT

## 2017-09-08 PROCEDURE — 82306 VITAMIN D 25 HYDROXY: CPT

## 2017-09-08 PROCEDURE — 80061 LIPID PANEL: CPT

## 2017-09-08 PROCEDURE — 85025 COMPLETE CBC W/AUTO DIFF WBC: CPT

## 2017-09-08 PROCEDURE — 80053 COMPREHEN METABOLIC PANEL: CPT

## 2017-09-08 PROCEDURE — 36415 COLL VENOUS BLD VENIPUNCTURE: CPT | Mod: PO

## 2017-09-09 LAB
ESTIMATED AVG GLUCOSE: 123 MG/DL
HBA1C MFR BLD HPLC: 5.9 %

## 2017-09-27 ENCOUNTER — TELEPHONE (OUTPATIENT)
Dept: FAMILY MEDICINE | Facility: CLINIC | Age: 72
End: 2017-09-27

## 2017-09-27 NOTE — TELEPHONE ENCOUNTER
----- Message from Sonja Granados sent at 9/27/2017 11:20 AM CDT -----  Contact: self   Patient want to speak with a nurse regarding a allergic reaction to the blood pressure medicine she is taking please call back at [phone] (home)

## 2017-10-02 ENCOUNTER — IMMUNIZATION (OUTPATIENT)
Dept: FAMILY MEDICINE | Facility: CLINIC | Age: 72
End: 2017-10-02
Payer: MEDICARE

## 2017-10-02 PROCEDURE — G0008 ADMIN INFLUENZA VIRUS VAC: HCPCS | Mod: PBBFAC,PO

## 2017-10-02 NOTE — PROGRESS NOTES
Two person identification name, d.o.b with verbal feedback.  Aseptic technique used. Administration influenza high dose vaccine on R deltoid.  Tolerated well.  VIS 8/7/15  given/mp

## 2017-10-02 NOTE — TELEPHONE ENCOUNTER
Patient came into the office today for a flu shot, she did not mention anything about an allergic reaction from last week from her BP medication.

## 2017-10-17 ENCOUNTER — OFFICE VISIT (OUTPATIENT)
Dept: RHEUMATOLOGY | Facility: CLINIC | Age: 72
End: 2017-10-17
Payer: MEDICARE

## 2017-10-17 VITALS
HEIGHT: 60 IN | WEIGHT: 203.5 LBS | SYSTOLIC BLOOD PRESSURE: 148 MMHG | BODY MASS INDEX: 39.95 KG/M2 | DIASTOLIC BLOOD PRESSURE: 80 MMHG

## 2017-10-17 DIAGNOSIS — M05.79 RHEUMATOID ARTHRITIS INVOLVING MULTIPLE SITES WITH POSITIVE RHEUMATOID FACTOR: Primary | ICD-10-CM

## 2017-10-17 PROCEDURE — 99213 OFFICE O/P EST LOW 20 MIN: CPT | Mod: ,,, | Performed by: INTERNAL MEDICINE

## 2017-10-17 NOTE — PROGRESS NOTES
[facility] RHEUMATOLOGY            PROGRESS NOTE      Subjective:       Patient ID:   NAME: Ling Rapp : 1945     71 y.o. female    Referring Doc: No ref. provider found  Other Physicians:    Chief Complaint:  Rheumatoid Arthritis and Pain (6/10 neck)      History of Present Illness:     Patient returns today for a regularly scheduled follow-up visit for   Rheumatoid arthritis.    The patient is doing well. No prolonged morning stiffness or joint swelling. She has dyspnea on exertion presumably from pulmonary hypertension On O2            ROS:   GEN:  No  fever, night sweats . weight is stable   some fatigue  SKIN: no rashes, no bruising, no ulcerations, no Raynaud's  HEENT: no HA's, No visual changes, no mucosal ulcers, no sicca symptoms,  CV:   no CP, SOB, PND+, SEN, no orthopnea, no palpitations  PULM: + SOB, cough, hemoptysis, sputum or pleuritic pain  GI:  no abdominal pain, nausea, vomiting, constipation, diarrhea, melanotic stools, BRBPR, hematemesis, no dysphagia  :   no dysuria  NEURO: no paresthesias, headaches, visual disturbances, muscle weakness  MUSCULOSKELETAL:no joint swelling, prolonged AM stiffness, + cervical pain, no muscle pain  Allergies:  Review of patient's allergies indicates:   Allergen Reactions    Amoxicillin-pot clavulanate Diarrhea and Other (See Comments)     Sever cramping    Diltiazem Other (See Comments)     Extreme dry moth    Fenofibrate Other (See Comments)     Other reaction(s): Itching    Hydroxychloroquine      Other reaction(s): eye accomodation problems    Leflunomide      Other reaction(s): abd pains    Lisinopril-hydrochlorothiazide      Other reaction(s): diarrhea  Other reaction(s): cramps    Methotrexate      Other reaction(s): pancreatitis    Norvasc [amlodipine] Swelling    Sulfa (sulfonamide antibiotics)      Other reaction(s): Swelling  Other reaction(s): Hives    Sulfamethoxazole-trimethoprim        Medications:    Current Outpatient  Prescriptions:     albuterol (PROAIR HFA) 90 mcg/actuation inhaler, Inhale 2 puffs into the lungs every 6 (six) hours as needed for Wheezing., Disp: 3 Inhaler, Rfl: 5    artificial saliva, yerbas-lyt, SprA, Use as directed, Disp: 240 mL, Rfl: 11    aspirin 81 mg Tab, Every day, Disp: , Rfl:     atenolol (TENORMIN) 100 MG tablet, TAKE 1 TABLET DAILY, Disp: 90 tablet, Rfl: 2    azelastine (ASTELIN) 137 mcg nasal spray, 1 spray by Nasal route 2 (two) times daily., Disp: , Rfl:     CALCIUM CARBONATE/VITAMIN D3 (OS-ADDY 500 + D) 500-125 mg-unit Tab, Twice a day, Disp: , Rfl:     cetirizine (ZYRTEC) 10 MG tablet, Every day, Disp: , Rfl:     cholecalciferol, vitamin D3, 5,000 unit capsule, Take 5,000 Units by mouth once daily. 5 a week, Disp: , Rfl:     cyanocobalamin (VITAMIN B-12) 100 MCG tablet, Take 100 mcg by mouth once daily., Disp: , Rfl:     diclofenac sodium (VOLTAREN) 1 % Gel, Apply 2 g topically once daily., Disp: , Rfl:     etanercept (ENBREL) 50 mg/mL (0.98 mL) injection, once weekly, Disp: , Rfl:     fluticasone (FLONASE) 50 mcg/actuation nasal spray, 2 sprays by Each Nare route once daily. Every day, Disp: 48 g, Rfl: 3    garlic (GARLIC OIL) 1,000 mg Cap, Take by mouth., Disp: , Rfl:     Lactobacillus rhamnosus GG (CULTURELLE) 10 billion cell capsule, Take 1 capsule by mouth once daily., Disp: , Rfl:     magnesium oxide (MAG-OX) 400 mg tablet, Take 400 mg by mouth once daily., Disp: , Rfl:     montelukast (SINGULAIR) 10 mg tablet, Take 1 tablet (10 mg total) by mouth every evening., Disp: 90 tablet, Rfl: 1    omeprazole (PRILOSEC) 40 MG capsule, TAKE 1 CAPSULE TWICE DAILY BEFORE MEALS, Disp: 180 capsule, Rfl: 2    salsalate (DISALCID) 500 MG tablet, 500 mg daily as needed. Twice a day, Disp: , Rfl:     tramadol (ULTRAM) 50 mg tablet, Take 50 mg by mouth every 6 (six) hours as needed. , Disp: , Rfl:     UBIDECARENONE (COQ-10 ORAL), Take by mouth., Disp: , Rfl:     beclomethasone (QVAR) 80  mcg/actuation Aero, Inhale 1 puff into the lungs 2 (two) times daily., Disp: 26.1 g, Rfl: 3    PMHx/PSHx Updates:        No results found for this or any previous visit.  No results found for this or any previous visit.  Last Eye Exam      Objective:     Vitals:  Blood pressure (!) 148/80, height 5' (1.524 m), weight 92.3 kg (203 lb 8 oz).    Physical Examination:   GEN: no apparent distress, comfortable; AAOx3  SKIN: no rashes,no ulceration, no Raynaud's, no petechiae, no SQ nodules,  HEAD: normal  EYES: no pallor, no icterus,   NECK: no masses, thyroid normal, trachea midline, no LAD/LN's, supple  CV: RRR with no murmur; l S1 and S2 reg. ,no gallop no rubs,   CHEST: Normal respiratory effort; CTAB; normal breath sounds; no wheeze + bibasilar crackles  MUSC/Skeletal: ROM normal; no crepitus; joints without synovitis,  no deformities  No joint swelling or tenderness of PIP, MCP, wrist, elbow, shoulder, or knee joints  EXTREM: no clubbing, cyanosis, no edema,normal  pulses   NEURO: grossly intact; motor WNL; AAOx3;   PSYCH: normal mood, affect and behavior  LYMPH: normal cervical, supraclavicular          Labs:   Lab Results   Component Value Date    WBC 8.22 09/08/2017    HGB 13.7 09/08/2017    HCT 41.9 09/08/2017    MCV 92 09/08/2017     09/08/2017    CMP  @LASTLAB(NA,K,CL,CO2,GLU,BUN,Creatinine,Calcium,PROT,Albumin,Bilitot,Alkphos,AST,ALT,CRP,ESR,RF,CCP,VANNA,SSA,CPK,uric acid) )@  I have reviewed all available lab results and radiology reports.    Radiology/Diagnostic Studies:  CBC CMP done recently were within normal range.  Last TB gold done in January 2017.( neg)    Assessment/Plan:   (1) 71 y.o. female with diagnosis of Rheumatoid arthritis; this is stable  History of pulmonary hypertension. She will have right heart catheterization to measure PAP  Reviewed recent CT scan of the lungs with very mild interstitial changes. Recent CBC and CMP within normal range    Continue Enbrel.          Discussion:      I have explained all of the above in detail and the patient understands all of the current recommendation(s). I have answered all questions to the best of my ability and to their complete satisfaction.       The patient is to continue with the current management plan         RTC in   3 months      Electronically signed by Manjinder Schultz MD

## 2018-01-16 ENCOUNTER — OFFICE VISIT (OUTPATIENT)
Dept: RHEUMATOLOGY | Facility: CLINIC | Age: 73
End: 2018-01-16
Payer: MEDICARE

## 2018-01-16 VITALS
BODY MASS INDEX: 38.68 KG/M2 | WEIGHT: 197 LBS | DIASTOLIC BLOOD PRESSURE: 70 MMHG | HEIGHT: 60 IN | SYSTOLIC BLOOD PRESSURE: 142 MMHG

## 2018-01-16 DIAGNOSIS — M05.79 RHEUMATOID ARTHRITIS INVOLVING MULTIPLE SITES WITH POSITIVE RHEUMATOID FACTOR: Primary | ICD-10-CM

## 2018-01-16 PROCEDURE — 99212 OFFICE O/P EST SF 10 MIN: CPT | Mod: ,,, | Performed by: INTERNAL MEDICINE

## 2018-01-16 NOTE — PROGRESS NOTES
[facility] RHEUMATOLOGY            PROGRESS NOTE      Subjective:       Patient ID:   NAME: Ling Rapp : 1945     72 y.o. female    Referring Doc: No ref. provider found  Other Physicians:    Chief Complaint:  No chief complaint on file.      History of Present Illness:     Patient returns today for a regularly scheduled follow-up visit for  Rheumatoid Arthritis     The patient is doing well  No prolonged AM stiffness,joint swelling or pains  No CP  Had r heart cath last week,apparently no pulmonary  hypertension as per patient.            ROS:   GEN:  No  fever, night sweats . weight is stable   +fatigue  SKIN: no rashes, no bruising, no ulcerations, no Raynaud's  HEENT: no HA's, No visual changes, no mucosal ulcers, no sicca symptoms,  CV:   no CP, SOB, PND, SEN, no orthopnea, no palpitations  PULM: normal with no SOB, cough, hemoptysis, sputum or pleuritic pain  GI:  no abdominal pain, nausea, vomiting, constipation, diarrhea, melanotic stools, BRBPR, hematemesis, no dysphagia  :   no dysuria  NEURO: no paresthesias, headaches, visual disturbances, muscle weakness  MUSCULOSKELETAL:no joint swelling, prolonged AM stiffness, no back pain, no muscle pain  Allergies:  Review of patient's allergies indicates:   Allergen Reactions    Amoxicillin-pot clavulanate Diarrhea and Other (See Comments)     Sever cramping    Diltiazem Other (See Comments)     Extreme dry moth    Fenofibrate Other (See Comments)     Other reaction(s): Itching    Hydroxychloroquine      Other reaction(s): eye accomodation problems    Leflunomide      Other reaction(s): abd pains    Lisinopril-hydrochlorothiazide      Other reaction(s): diarrhea  Other reaction(s): cramps    Methotrexate      Other reaction(s): pancreatitis    Norvasc [amlodipine] Swelling    Sulfa (sulfonamide antibiotics)      Other reaction(s): Swelling  Other reaction(s): Hives    Sulfamethoxazole-trimethoprim        Medications:    Current  Outpatient Prescriptions:     albuterol (PROAIR HFA) 90 mcg/actuation inhaler, Inhale 2 puffs into the lungs every 6 (six) hours as needed for Wheezing., Disp: 3 Inhaler, Rfl: 5    artificial saliva, yerbas-lyt, SprA, Use as directed, Disp: 240 mL, Rfl: 11    aspirin 81 mg Tab, Every day, Disp: , Rfl:     atenolol (TENORMIN) 100 MG tablet, TAKE 1 TABLET DAILY, Disp: 90 tablet, Rfl: 2    azelastine (ASTELIN) 137 mcg nasal spray, 1 spray by Nasal route 2 (two) times daily., Disp: , Rfl:     CALCIUM CARBONATE/VITAMIN D3 (OS-ADDY 500 + D) 500-125 mg-unit Tab, Twice a day, Disp: , Rfl:     cetirizine (ZYRTEC) 10 MG tablet, Every day, Disp: , Rfl:     cholecalciferol, vitamin D3, 5,000 unit capsule, Take 5,000 Units by mouth once daily. 5 a week, Disp: , Rfl:     cyanocobalamin (VITAMIN B-12) 100 MCG tablet, Take 100 mcg by mouth once daily., Disp: , Rfl:     diclofenac sodium (VOLTAREN) 1 % Gel, Apply 2 g topically once daily., Disp: , Rfl:     etanercept (ENBREL) 50 mg/mL (0.98 mL) injection, once weekly, Disp: , Rfl:     fluticasone (FLONASE) 50 mcg/actuation nasal spray, 2 sprays by Each Nare route once daily. Every day, Disp: 48 g, Rfl: 3    garlic (GARLIC OIL) 1,000 mg Cap, Take by mouth., Disp: , Rfl:     Lactobacillus rhamnosus GG (CULTURELLE) 10 billion cell capsule, Take 1 capsule by mouth once daily., Disp: , Rfl:     magnesium oxide (MAG-OX) 400 mg tablet, Take 400 mg by mouth once daily., Disp: , Rfl:     montelukast (SINGULAIR) 10 mg tablet, Take 1 tablet (10 mg total) by mouth every evening., Disp: 90 tablet, Rfl: 1    omeprazole (PRILOSEC) 40 MG capsule, TAKE 1 CAPSULE TWICE DAILY BEFORE MEALS, Disp: 180 capsule, Rfl: 2    salsalate (DISALCID) 500 MG tablet, 500 mg daily as needed. Twice a day, Disp: , Rfl:     tramadol (ULTRAM) 50 mg tablet, Take 50 mg by mouth every 6 (six) hours as needed. , Disp: , Rfl:     UBIDECARENONE (COQ-10 ORAL), Take by mouth., Disp: , Rfl:     beclomethasone  (QVAR) 80 mcg/actuation Aero, Inhale 1 puff into the lungs 2 (two) times daily., Disp: 26.1 g, Rfl: 3    PMHx/PSHx Updates:    Objective:     Vitals:  Blood pressure (!) 142/70, height 5' (1.524 m), weight 89.4 kg (197 lb).    Physical Examination:   GEN: no apparent distress, comfortable; AAOx3  SKIN: no rashes,no ulceration, no Raynaud's, no petechiae, no SQ nodules,  HEAD: normal  EYES: no pallor, no icterus,  NECK: no masses, thyroid normal, trachea midline, no LAD/LN's, supple  CV: RRR with no murmur; l S1 and S2 reg. ,no gallop no rubs,   CHEST: Normal respiratory effort; CTAB; normal breath sounds; no wheeze or crackles  MUSC/Skeletal: ROM normal; no crepitus; joints without synovitis,   No joint swelling or tenderness of PIP, MCP, wrist, elbow, shoulder, or knee joints.[place]ber[place]'s nodes  EXTREM: no clubbing, cyanosis, no edema,normal  pulses   NEURO: grossly intact; motor WNL; AAOx3; ,   PSYCH: normal mood, affect and behavior  LYMPH: normal cervical, supraclavicular          Labs:   Lab Results   Component Value Date    WBC 8.22 09/08/2017    HGB 13.7 09/08/2017    HCT 41.9 09/08/2017    MCV 92 09/08/2017     09/08/2017    CMP  @LASTLAB(NA,K,CL,CO2,GLU,BUN,Creatinine,Calcium,PROT,Albumin,Bilitot,Alkphos,AST,ALT,CRP,ESR,RF,CCP,VANNA,SSA,CPK,uric acid) )@  I have reviewed all available lab results and radiology reports.    Radiology/Diagnostic Studies:        Assessment/Plan:   (1) 72 y.o. female with diagnosis of Rheumatoid Arthritis. This is stable.  CBC,CMP,CRP,TB Gold                  Discussion:     I have explained all of the above in detail and the patient understands all of the current recommendation(s). I have answered all questions to the best of my ability and to their complete satisfaction.       The patient is to continue with the current management plan         RTC in  4 months        Electronically signed by Manjinder Schultz MD

## 2018-02-19 LAB
ALBUMIN SERPL-MCNC: 4 G/DL (ref 3.1–4.7)
ALP SERPL-CCNC: 69 IU/L (ref 40–104)
ALT (SGPT): 18 IU/L (ref 3–33)
AST SERPL-CCNC: 21 IU/L (ref 10–40)
BASOPHILS NFR BLD: 0.1 K/UL (ref 0–0.2)
BASOPHILS NFR BLD: 1 %
BILIRUB SERPL-MCNC: 0.8 MG/DL (ref 0.3–1)
BUN SERPL-MCNC: 15 MG/DL (ref 8–20)
CALCIUM SERPL-MCNC: 9.1 MG/DL (ref 7.7–10.4)
CHLORIDE: 101 MMOL/L (ref 98–110)
CO2 SERPL-SCNC: 27.1 MMOL/L (ref 22.8–31.6)
CREATININE: 0.6 MG/DL (ref 0.6–1.4)
CRP SERPL-MCNC: 0.12 MG/DL (ref 0–1.4)
EOSINOPHIL NFR BLD: 0.3 K/UL (ref 0–0.7)
EOSINOPHIL NFR BLD: 3.6 %
ERYTHROCYTE [DISTWIDTH] IN BLOOD BY AUTOMATED COUNT: 13.2 % (ref 11.7–14.9)
GLUCOSE: 107 MG/DL (ref 70–99)
GRAN #: 4.2 K/UL (ref 1.4–6.5)
GRAN%: 49.9 %
HCT VFR BLD AUTO: 43.6 % (ref 36–48)
HGB BLD-MCNC: 13.7 G/DL (ref 12–15)
IMMATURE GRANS (ABS): 0 K/UL (ref 0–1)
IMMATURE GRANULOCYTES: 0.4 %
LYMPH #: 2.9 K/UL (ref 1.2–3.4)
LYMPH%: 34.8 %
MCH RBC QN AUTO: 29.3 PG (ref 25–35)
MCHC RBC AUTO-ENTMCNC: 31.4 G/DL (ref 31–36)
MCV RBC AUTO: 93.4 FL (ref 79–98)
MONO #: 0.9 K/UL (ref 0.1–0.6)
MONO%: 10.3 %
NUCLEATED RBCS: 0 %
PLATELET # BLD AUTO: 197 K/UL (ref 140–440)
PMV BLD AUTO: 10.8 FL (ref 8.8–12.7)
POTASSIUM SERPL-SCNC: 4.1 MMOL/L (ref 3.5–5)
PROT SERPL-MCNC: 7.8 G/DL (ref 6–8.2)
RBC # BLD AUTO: 4.67 M/UL (ref 3.5–5.5)
SODIUM: 137 MMOL/L (ref 134–144)
WBC # BLD AUTO: 8.4 K/UL (ref 5–10)

## 2018-03-01 ENCOUNTER — DOCUMENTATION ONLY (OUTPATIENT)
Dept: FAMILY MEDICINE | Facility: CLINIC | Age: 73
End: 2018-03-01

## 2018-03-01 NOTE — PROGRESS NOTES
Pre-Visit Chart Review  For Appointment Scheduled on 3-8-18    Health Maintenance Due   Topic Date Due    TETANUS VACCINE  10/31/1962    Zoster Vaccine  10/31/2004    DEXA SCAN  12/11/2017

## 2018-03-05 DIAGNOSIS — Z78.0 ASYMPTOMATIC MENOPAUSAL STATE: Primary | ICD-10-CM

## 2018-03-08 ENCOUNTER — OFFICE VISIT (OUTPATIENT)
Dept: FAMILY MEDICINE | Facility: CLINIC | Age: 73
End: 2018-03-08
Payer: MEDICARE

## 2018-03-08 VITALS
RESPIRATION RATE: 12 BRPM | HEART RATE: 60 BPM | BODY MASS INDEX: 39.56 KG/M2 | SYSTOLIC BLOOD PRESSURE: 124 MMHG | OXYGEN SATURATION: 99 % | WEIGHT: 201.5 LBS | HEIGHT: 60 IN | DIASTOLIC BLOOD PRESSURE: 78 MMHG | TEMPERATURE: 98 F

## 2018-03-08 DIAGNOSIS — I10 GOOD HYPERTENSION CONTROL: ICD-10-CM

## 2018-03-08 DIAGNOSIS — J30.9 CHRONIC ALLERGIC RHINITIS, UNSPECIFIED SEASONALITY, UNSPECIFIED TRIGGER: ICD-10-CM

## 2018-03-08 DIAGNOSIS — E83.42 HYPOMAGNESEMIA: ICD-10-CM

## 2018-03-08 DIAGNOSIS — M05.79 RHEUMATOID ARTHRITIS INVOLVING MULTIPLE SITES WITH POSITIVE RHEUMATOID FACTOR: ICD-10-CM

## 2018-03-08 DIAGNOSIS — E78.5 HYPERLIPIDEMIA, UNSPECIFIED HYPERLIPIDEMIA TYPE: ICD-10-CM

## 2018-03-08 DIAGNOSIS — Z78.0 MENOPAUSE: ICD-10-CM

## 2018-03-08 DIAGNOSIS — J45.40 MODERATE PERSISTENT ASTHMA WITHOUT COMPLICATION: ICD-10-CM

## 2018-03-08 DIAGNOSIS — R73.03 PREDIABETES: ICD-10-CM

## 2018-03-08 DIAGNOSIS — Z76.89 ESTABLISHING CARE WITH NEW DOCTOR, ENCOUNTER FOR: Primary | ICD-10-CM

## 2018-03-08 DIAGNOSIS — E55.9 VITAMIN D DEFICIENCY: ICD-10-CM

## 2018-03-08 PROCEDURE — 99214 OFFICE O/P EST MOD 30 MIN: CPT | Mod: PBBFAC,PO | Performed by: FAMILY MEDICINE

## 2018-03-08 PROCEDURE — 99999 PR PBB SHADOW E&M-EST. PATIENT-LVL IV: CPT | Mod: PBBFAC,,, | Performed by: FAMILY MEDICINE

## 2018-03-08 PROCEDURE — 99215 OFFICE O/P EST HI 40 MIN: CPT | Mod: S$PBB,,, | Performed by: FAMILY MEDICINE

## 2018-03-08 RX ORDER — BENZONATATE 200 MG/1
200 CAPSULE ORAL 3 TIMES DAILY PRN
COMMUNITY

## 2018-03-08 RX ORDER — AZELASTINE 1 MG/ML
1 SPRAY, METERED NASAL 2 TIMES DAILY
COMMUNITY

## 2018-03-08 NOTE — PATIENT INSTRUCTIONS
Controlling High Blood Pressure  High blood pressure (hypertension) is often called the silent killer. This is because many people who have it dont know it. High blood pressure is defined as 140/90 mm Hg or higher. Know your blood pressure and remember to check it regularly. Doing so can save your life. Here are some things you can do to help control your blood pressure.    Choose heart-healthy foods  · Select low-salt, low-fat foods. Limit sodium intake to 2,400 mg per day or the amount suggested by your healthcare provider.  · Limit canned, dried, cured, packaged, and fast foods. These can contain a lot of salt.  · Eat 8 to 10 servings of fruits and vegetables every day.  · Choose lean meats, fish, or chicken.  · Eat whole-grain pasta, brown rice, and beans.  · Eat 2 to 3 servings of low-fat or fat-free dairy products.  · Ask your doctor about the DASH eating plan. This plan helps reduce blood pressure.  · When you go to a restaurant, ask that your meal be prepared with no added salt.  Maintain a healthy weight  · Ask your healthcare provider how many calories to eat a day. Then stick to that number.  · Ask your healthcare provider what weight range is healthiest for you. If you are overweight, a weight loss of only 3% to 5% of your body weight can help lower blood pressure. Generally, a good weight loss goal is to lose 10% of your body weight in a year.  · Limit snacks and sweets.  · Get regular exercise.  Get up and get active  · Choose activities you enjoy. Find ones you can do with friends or family. This includes bicycling, dancing, walking, and jogging.  · Park farther away from building entrances.  · Use stairs instead of the elevator.  · When you can, walk or bike instead of driving.  · [place] leaves, garden, or do household repairs.  · Be active at a moderate to vigorous level of physical activity for at least 40 minutes for a minimum of 3 to 4 days a week.   Manage stress  · Make time to relax and enjoy  life. Find time to laugh.  · Communicate your concerns with your loved ones and your healthcare provider.  · Visit with family and friends, and keep up with hobbies.  Limit alcohol and quit smoking  · Men should have no more than 2 drinks per day.  · Women should have no more than 1 drink per day.  · Talk with your healthcare provider about quitting smoking. Smoking significantly increases your risk for heart disease and stroke. Ask your healthcare provider about community smoking cessation programs and other options.  Medicines  If lifestyle changes arent enough, your healthcare provider may prescribe high blood pressure medicine. Take all medicines as prescribed. If you have any questions about your medicines, ask your healthcare provider before stopping or changing them.   Date Last Reviewed: 4/27/2016 © 2000-2017 [employer]. [address] [place] [address], [place] [address]. All rights reserved. This information is not intended as a substitute for professional medical care. Always follow your healthcare professional's instructions.        Weight Management: Getting Started  Healthy bodies come in all shapes and sizes. Not all bodies are made to be thin. For some people, a healthy weight is higher than the average weight listed on weight charts. Your healthcare provider can help you decide on a healthy weight for you.    Reasons to lose weight  Losing weight can help with some health problems, such as high blood pressure, heart disease, diabetes, sleep apnea, and arthritis. You may also feel more energy.  Set your long-term goal  Your goal doesn't even have to be a specific weight. You may decide on a fitness goal (such as being able to walk 10 miles a week), or a health goal (such as lowering your blood pressure). Choose a goal that is measurable and reasonable, so you know when you've reached it. A goal of reaching a BMI of less than 25 is not always reasonable (or possible).   Make an action  plan  Habits dont change overnight. Setting your goals too high can leave you feeling discouraged if you cant reach them. Be realistic. Choose one or two small changes you can make now. Set an action plan for how you are going to make these changes. When you can stick to this plan, keep making a few more small changes. Taking small steps will help you stay on the path to success.  Track your progress  Write down your goals. Then, keep a daily record of your progress. Write down what you eat and how active you are. This record lets you look back on how much youve done. It may also help when youre feeling frustrated. Reward yourself for success. Even if you dont reach every goal, give yourself credit for what you do get done.  Get support  Encouragement from others can help make losing weight easier. Ask your family members and friends for support. They may even want to join you. Also look to your healthcare provider, registered dietitian, and  for help. Your local hospital can give you more information about nutrition, exercise, and weight loss.  Date Last Reviewed: 1/31/2016 © 2000-2017 [employer]. [address] [place] [address]. All rights reserved. This information is not intended as a substitute for professional medical care. Always follow your healthcare professional's instructions.        Walking for Fitness  Fitness walking has something for everyone, even people who are already fit. Walking is one of the safest ways to condition your body aerobically. It can boost energy, help you lose weight, and reduce stress.    Physical benefits  · Walking strengthens your heart and lungs, and tones your muscles.  · When walking, your feet land with less impact than in other sports. This reduces chances of muscle, bone, and joint injury.  · Regular walking improves your cholesterol levels and lowers your risk of heart disease. And it helps you control your blood sugar if  you have diabetes.  · Walking is a weight-bearing activity, which helps maintain bone density. This can help prevent osteoporosis.  Personal rewards  · Taking walks can help you relax and manage stress. And fitness walking may make you feel better about yourself.  · Walking can help you sleep better at night and make you less likely to be depressed.  · Regular walking may help maintain your memory as you get older.  · Walking is a great way to spend extra time with friends and family members. Be sure to invite your dog along!  Q&A about fitness walking  Q: Will walking keep me fit?  A: Yes. Regular walking at the right pace gives you all the benefits of other aerobic activities, such as jogging and swimming.  Q: Will walking help me lose weight and keep it off?  A: Yes. Per mile, walking can burn as many calories as jogging. Your health care provider can help work walking into your weight-loss plan.  Q: Is walking safe for my health?  A: Yes. Walking is safe if you have high blood pressure, diabetes, heart disease, or other conditions. Talk to your healthcare provider before you start.  Date Last Reviewed: 4/1/2017  © 5244-7099 [employer]. [address] [place] [address], [place] [address]. All rights reserved. This information is not intended as a substitute for professional medical care. Always follow your healthcare professional's instructions.

## 2018-03-08 NOTE — PROGRESS NOTES
"Subjective:       Patient ID: Ling Rapp is a 72 y.o. female.    Chief Complaint: Establish Care    The following is a Dragon Naturally-Speaking voice-recognition dictation.  It frequently converts normal text into incorrect phrases for which the author apologizes.      72-year-old female coming in to establish care.  She left her former PCP over complaints of "an ALLERGIC reaction" to her blood pressure medication she had been given which causes several days of vague malaise followed by 5 days of diarrhea.  Sounds like she had a GI intestinal virus but she reports she had tried the same medication years ago and had the same reaction.  She claims the lisinopril is a "sulfa drug" and she is ALLERGIC to sulfa.  She has a history of rheumatoid arthritis seropositive followed by Dr. Schultz.  She is currently on Enbrel and has a history of intolerance of Plaquenil and methotrexate.  The methotrexate apparently caused hypertriglyceridemia and she had several episodes of pancreatitis due to that.  History also includes hypertension, hyperlipidemia, impaired glucose tolerance, vitamin D deficiency, asthma, reflux, osteopenia and the pancreatitis.  She has been seeing Dr. Sanon for chronic lung disease and has been diagnosed with asthma.  We don't have any records but I suspect she also has some rheumatoid lung disease.  Dr. Sanon sent her to Dr. Vallejo for a cardiac workup suspecting pulmonary hypertension and that was not found.  She did have a pharmacologic nuclear stress test with no sign of any ischemia and carotid ultrasounds were also checked with no significant blockages.    Past Medical History:  No date: ALLERGIC RHINITIS  No date: Anticoagulant long-term use  No date: Asthma  No date: Cataract  No date: Former smoker  No date: GERD (gastroesophageal reflux disease)  No date: Hemorrhoids  No date: Hypertension  No date: Osteopenia  No date: Pancreatitis      Comment: secondary to severe " hypertriglyceridemia  No date: Peptic ulcer  No date: Rheumatoid arthritis  No date: Vitamin D deficiency    Past Surgical History:  No date: Athroscopy(knee)  No date: BREAST BIOPSY  No date: CARDIAC CATHETERIZATION  2013: CATARACT EXTRACTION Left  ~10 years ago: COLONOSCOPY  3/30/2016: COLONOSCOPY N/A      Comment: Procedure: COLONOSCOPY;  Surgeon: Thee Sorenson MD;  Location: [facility];  Service:                Endoscopy;  Laterality: N/A;  No date: EYE SURGERY      Comment: cataracts bilateral   1997: FRACTURE SURGERY      Comment: right knee   1982: HYSTERECTOMY  No date: PARTIAL HYSTERECTOMY      Comment: w/ USO  No date: TONSILLECTOMY  5/2013: UPPER GASTROINTESTINAL ENDOSCOPY    Review of patient's family history indicates:    Colon polyps                   Sister                    Ulcers                         Father                    Emphysema                      Father                      Comment: smoker    Alcohol abuse                  Father                    Thyroid disease                Mother                    Heart disease                  Mother                    Emphysema                      Mother                      Comment: smoker    Alzheimer's disease            Brother                   Stroke                         Brother                   Cancer                         Brother                   Throat cancer                  Brother                     Comment: non smoker    No Known Problems              Son                       Diabetes                       Maternal Uncle              Comment: 1/2 brother    Cancer                         Paternal Aunt             Early death                    Paternal Aunt             Leukemia                       Paternal Aunt               Comment: highschool    Breast cancer                  Paternal Aunt             Heart disease                  Paternal Uncle            Rheum arthritis                Maternal  Grandmother      Pneumonia                      Maternal Grandfather      Cancer                         Paternal Grandmother      Breast cancer                  Paternal Grandmother      Early death                    Paternal Grandfather        Comment:  killed in line of duty    Ovarian cancer                 Neg Hx                    Colon cancer                   Neg Hx                    Inflammatory bowel disease     Neg Hx                    Crohn's disease                Neg Hx                    Ulcerative colitis             Neg Hx                    Social History    Marital status:              Spouse name:                       Years of education:                 Number of children:               Social History Main Topics    Smoking status: Former Smoker                                                                Packs/day: 0.00      Years: 0.00           Types: Cigarettes    Smokeless tobacco: Never Used                        Comment: quit 1972    Alcohol use: No              Drug use: No              Sexual activity: Yes                      Current Outpatient Prescriptions:     albuterol (PROAIR HFA) 90 mcg/actuation inhaler, Inhale 2 puffs into the lungs every 6 (six) hours as needed for Wheezing., Disp: 3 Inhaler, Rfl: 5    artificial saliva, yerbas-lyt, SprA, Use as directed, Disp: 240 mL, Rfl: 11    aspirin 81 mg Tab, Take 81 mg by mouth once daily. Every day, Disp: , Rfl:     atenolol (TENORMIN) 100 MG tablet, TAKE 1 TABLET DAILY, Disp: 90 tablet, Rfl: 2    azelastine (ASTELIN) 137 mcg (0.1 %) nasal spray, 1 spray by Nasal route 2 (two) times daily., Disp: , Rfl:     beclomethasone (QVAR) 80 mcg/actuation Aero, Inhale 1 puff into the lungs 2 (two) times daily. Controller, Disp: , Rfl:     benzonatate (TESSALON) 200 MG capsule, Take 200 mg by mouth 3 (three) times daily as needed for Cough., Disp: , Rfl:     CALCIUM CARBONATE/VITAMIN D3 (OS-ADDY 500 + D) 500-125 mg-unit Tab,  Take 1 tablet by mouth once daily. Twice a day, Disp: , Rfl:     cetirizine (ZYRTEC) 10 MG tablet, Take 10 mg by mouth once daily. Every day, Disp: , Rfl:     cholecalciferol, vitamin D3, 5,000 unit capsule, Take 5,000 Units by mouth once daily. 5 a week, Disp: , Rfl:     cyanocobalamin (VITAMIN B-12) 100 MCG tablet, Take 100 mcg by mouth once daily., Disp: , Rfl:     diclofenac sodium (VOLTAREN) 1 % Gel, Apply 2 g topically once daily., Disp: , Rfl:     etanercept (ENBREL) 50 mg/mL (0.98 mL) injection, 50 mg once a week. once weekly, Disp: , Rfl:     fluticasone (FLONASE) 50 mcg/actuation nasal spray, 2 sprays by Each Nare route once daily. Every day, Disp: 48 g, Rfl: 3    Lactobacillus rhamnosus GG (CULTURELLE) 10 billion cell capsule, Take 1 capsule by mouth once daily., Disp: , Rfl:     magnesium oxide (MAG-OX) 400 mg tablet, Take 400 mg by mouth once daily., Disp: , Rfl:     montelukast (SINGULAIR) 10 mg tablet, Take 1 tablet (10 mg total) by mouth every evening., Disp: 90 tablet, Rfl: 1    omeprazole (PRILOSEC) 40 MG capsule, TAKE 1 CAPSULE TWICE DAILY BEFORE MEALS, Disp: 180 capsule, Rfl: 2    salsalate (DISALCID) 500 MG tablet, 500 mg daily as needed. Twice a day, Disp: , Rfl:     tramadol (ULTRAM) 50 mg tablet, Take  mg by mouth every 6 to 8 hours as needed. , Disp: , Rfl:     UBIDECARENONE (COQ-10 ORAL), Take 1 capsule by mouth once daily. , Disp: , Rfl:     TETANUS VACCINE due on 10/31/1962  Zoster Vaccine due on 10/31/2004  DEXA SCAN due on 12/11/2017        Review of Systems   Constitutional: Negative for chills, fatigue and fever.   HENT: Positive for postnasal drip and rhinorrhea. Negative for congestion, sinus pain and sinus pressure.    Eyes: Negative for visual disturbance.   Respiratory: Positive for shortness of breath (on continuous oxygen). Negative for cough and chest tightness.    Cardiovascular: Negative for chest pain and palpitations.   Gastrointestinal: Negative for  abdominal pain, blood in stool, constipation, diarrhea (She normally has 3 bowel movements per day likely related to her 400 mg magnesium supplement), nausea and vomiting.   Endocrine: Negative for cold intolerance, heat intolerance, polydipsia and polyuria.   Genitourinary: Negative for dysuria, frequency, hematuria and urgency.   Musculoskeletal: Positive for arthralgias, gait problem and joint swelling.   Skin: Negative for color change and rash.   Neurological: Negative for dizziness, light-headedness and headaches.   Psychiatric/Behavioral: Negative for dysphoric mood and sleep disturbance. The patient is not nervous/anxious.        Objective:      Physical Exam   Constitutional: She is oriented to person, place, and time. She appears well-developed. No distress.   Initial blood pressure was elevated but repeat after some relaxation techniques return to normal.  She is obese with a BMI of 40.0  She is on oxygen continuously with a 99% oxygen saturation on 2 L   HENT:   Head: Normocephalic and atraumatic.   Right Ear: External ear normal.   Left Ear: External ear normal.   Mouth/Throat: Oropharynx is clear and moist. No oropharyngeal exudate.   Mild nasal turbinate swelling with no erythema or exudate   Eyes: Conjunctivae and EOM are normal. Pupils are equal, round, and reactive to light. Right eye exhibits no discharge. Left eye exhibits no discharge. No scleral icterus.   Neck: Normal range of motion. Neck supple. No JVD present. No tracheal deviation present. No thyromegaly present.   Cardiovascular: Normal rate, regular rhythm and normal heart sounds.  Exam reveals no gallop and no friction rub.    No murmur heard.  Carotid pulses 2+ no bruit   Pulmonary/Chest: Effort normal. No respiratory distress. She has no wheezes. She has rales (bibasal rales which the patient reports are chronic). She exhibits no tenderness.   Abdominal: Soft. Bowel sounds are normal. She exhibits no distension and no mass. There is  no tenderness. There is no rebound and no guarding.   Musculoskeletal: Normal range of motion. She exhibits no edema or tenderness.   Lymphadenopathy:     She has no cervical adenopathy.   Neurological: She is alert and oriented to person, place, and time. She has normal reflexes. She displays normal reflexes. No cranial nerve deficit or sensory deficit. She exhibits normal muscle tone.   Skin: Skin is warm and dry. No rash noted. She is not diaphoretic. No erythema.   Psychiatric: She has a normal mood and affect. Her behavior is normal. Judgment and thought content normal.   Nursing note and vitals reviewed.      Assessment:       1. Establishing care with new doctor, encounter for    2. Rheumatoid arthritis involving multiple sites with positive rheumatoid factor    3. Hypomagnesemia    4. Prediabetes    5. Hyperlipidemia, unspecified hyperlipidemia type    6. Menopause    7. Chronic allergic rhinitis, unspecified seasonality, unspecified trigger    8. Good hypertension control    9. Moderate persistent asthma without complication    10. Vitamin D deficiency        Plan:       1. Establishing care with new doctor, encounter for    2. Rheumatoid arthritis involving multiple sites with positive rheumatoid factor  Followed by Dr. Schultz    3. Hypomagnesemia  On supplement.  Will get lab with next lab to be drawn by Dr. Schultz  - Magnesium; Future    4. Prediabetes  Diet control, lab will be done with next lab per Dr. Schultz  - Hemoglobin A1c; Future    5. Hyperlipidemia, unspecified hyperlipidemia type  Due for repeat in September  - Lipid panel; Future    6. Menopause  - DXA Bone Density Spine And Hip; Future    7. Chronic allergic rhinitis, unspecified seasonality, unspecified trigger    8. Good hypertension control  No changes needed    9. Moderate persistent asthma without complication  Followed by Dr. Sanon    10. Vitamin D deficiency  - Vitamin D; Future         Patient readiness: acceptance and  barriers:none    During the course of the visit the patient was educated and counseled about the following:     Hypertension:   Medication: no change.  Dietary sodium restriction.  Regular aerobic exercise.  Obesity:   General weight loss/lifestyle modification strategies discussed (elicit support from others; identify saboteurs; non-food rewards, etc).  Informal exercise measures discussed, e.g. taking stairs instead of elevator.  Regular aerobic exercise program discussed.    Goals: Hypertension: Reduce Blood Pressure and Obesity: Reduce calorie intake and BMI    Did patient meet goals/outcomes: Yes    The following self management tools provided: blood pressure log  excercise log    Patient Instructions (the written plan) was given to the patient/family.     Time spent with patient: 55 minutes

## 2018-05-15 ENCOUNTER — OFFICE VISIT (OUTPATIENT)
Dept: RHEUMATOLOGY | Facility: CLINIC | Age: 73
End: 2018-05-15
Payer: MEDICARE

## 2018-05-15 VITALS
DIASTOLIC BLOOD PRESSURE: 70 MMHG | WEIGHT: 206.69 LBS | SYSTOLIC BLOOD PRESSURE: 126 MMHG | BODY MASS INDEX: 41.05 KG/M2

## 2018-05-15 DIAGNOSIS — M05.79 RHEUMATOID ARTHRITIS INVOLVING MULTIPLE SITES WITH POSITIVE RHEUMATOID FACTOR: Primary | ICD-10-CM

## 2018-05-15 PROCEDURE — 99212 OFFICE O/P EST SF 10 MIN: CPT | Mod: ,,, | Performed by: INTERNAL MEDICINE

## 2018-05-15 NOTE — PROGRESS NOTES
[facility] RHEUMATOLOGY            PROGRESS NOTE      Subjective:       Patient ID:   NAME: Ling Rapp : 1945     72 y.o. female    Referring Doc: No ref. provider found  Other Physicians:    Chief Complaint:  Rheumatoid Arthritis      History of Present Illness:     Patient returns today for a regularly scheduled follow-up visit for Rheumatoid arthritis      The patient doing well. No fevers or shortness of breath. No prolonged morning stiffness or joint swelling. No chest pains or gastrointestinal complaints she  Has had some episodes of asthma cleared with her inhalers          ROS:   GEN:  No  fever, night sweats . weight is stable   No fatigue  SKIN: no rashes, no bruising, no ulcerations, no Raynaud's  HEENT: no HA's, No visual changes, no mucosal ulcers, no sicca symptoms,  CV:   no CP, SOB, PND, SEN, no orthopnea, no palpitations  PULM: normal with no SOB, cough, hemoptysis, sputum or pleuritic pain  GI:  no abdominal pain, nausea, vomiting, constipation, diarrhea, melanotic stools, BRBPR, hematemesis, no dysphagia  :   no dysuria  NEURO: no paresthesias, headaches, visual disturbances, muscle weakness  MUSCULOSKELETAL:no joint swelling, prolonged AM stiffness, no back pain, no muscle pain  Allergies:  Review of patient's allergies indicates:   Allergen Reactions    Amoxicillin-pot clavulanate Diarrhea and Other (See Comments)     Sever cramping    Diltiazem Other (See Comments)     Extreme dry moth    Fenofibrate Other (See Comments)     Other reaction(s): Itching    Hydroxychloroquine      Other reaction(s): eye accomodation problems    Leflunomide      Other reaction(s): abd pains    Lisinopril-hydrochlorothiazide      Other reaction(s): diarrhea  Other reaction(s): cramps    Methotrexate      Other reaction(s): pancreatitis    Norvasc [amlodipine] Swelling    Sulfa (sulfonamide antibiotics)      Other reaction(s): Swelling  Other reaction(s): Hives     Sulfamethoxazole-trimethoprim        Medications:    Current Outpatient Prescriptions:     albuterol (PROAIR HFA) 90 mcg/actuation inhaler, Inhale 2 puffs into the lungs every 6 (six) hours as needed for Wheezing., Disp: 3 Inhaler, Rfl: 5    artificial saliva, yerbas-lyt, SprA, Use as directed, Disp: 240 mL, Rfl: 11    aspirin 81 mg Tab, Take 81 mg by mouth once daily. Every day, Disp: , Rfl:     atenolol (TENORMIN) 100 MG tablet, TAKE 1 TABLET DAILY, Disp: 90 tablet, Rfl: 2    azelastine (ASTELIN) 137 mcg (0.1 %) nasal spray, 1 spray by Nasal route 2 (two) times daily., Disp: , Rfl:     beclomethasone (QVAR) 80 mcg/actuation Aero, Inhale 1 puff into the lungs 2 (two) times daily. Controller, Disp: , Rfl:     benzonatate (TESSALON) 200 MG capsule, Take 200 mg by mouth 3 (three) times daily as needed for Cough., Disp: , Rfl:     CALCIUM CARBONATE/VITAMIN D3 (OS-ADDY 500 + D) 500-125 mg-unit Tab, Take 1 tablet by mouth once daily. Twice a day, Disp: , Rfl:     cetirizine (ZYRTEC) 10 MG tablet, Take 10 mg by mouth once daily. Every day, Disp: , Rfl:     cholecalciferol, vitamin D3, 5,000 unit capsule, Take 5,000 Units by mouth once daily. 5 a week, Disp: , Rfl:     cyanocobalamin (VITAMIN B-12) 100 MCG tablet, Take 100 mcg by mouth once daily., Disp: , Rfl:     diclofenac sodium (VOLTAREN) 1 % Gel, Apply 2 g topically once daily., Disp: , Rfl:     etanercept (ENBREL) 50 mg/mL (0.98 mL) injection, 50 mg once a week. once weekly, Disp: , Rfl:     fluticasone (FLONASE) 50 mcg/actuation nasal spray, 2 sprays by Each Nare route once daily. Every day, Disp: 48 g, Rfl: 3    Lactobacillus rhamnosus GG (CULTURELLE) 10 billion cell capsule, Take 1 capsule by mouth once daily., Disp: , Rfl:     magnesium oxide (MAG-OX) 400 mg tablet, Take 400 mg by mouth once daily., Disp: , Rfl:     montelukast (SINGULAIR) 10 mg tablet, Take 1 tablet (10 mg total) by mouth every evening., Disp: 90 tablet, Rfl: 1    omeprazole  (PRILOSEC) 40 MG capsule, TAKE 1 CAPSULE TWICE DAILY BEFORE MEALS, Disp: 180 capsule, Rfl: 2    salsalate (DISALCID) 500 MG tablet, 500 mg daily as needed. Twice a day, Disp: , Rfl:     tramadol (ULTRAM) 50 mg tablet, Take  mg by mouth every 6 to 8 hours as needed. , Disp: , Rfl:     UBIDECARENONE (COQ-10 ORAL), Take 1 capsule by mouth once daily. , Disp: , Rfl:     PMHx/PSHx Updates:          Objective:     Vitals:  Blood pressure 126/70, weight 93.8 kg (206 lb 11.2 oz).    Physical Examination:   GEN: no apparent distress, comfortable; AAOx3  SKIN: no rashes,no ulceration, no Raynaud's, no petechiae, no SQ nodules,  HEAD: normal  EYES: no pallor, no icterus,   NECK: no masses, thyroid normal, trachea midline, no LAD/LN's, supple  CV: RRR with no murmur; l S1 and S2 reg. ,no gallop no rubs,   CHEST: Normal respiratory effort; CTAB; normal breath sounds; no wheeze or crackles  MUSC/Skeletal: ROM normal; no crepitus; joints without synovitis,  no deformities  No joint swelling or tenderness of PIP, MCP, wrist, elbow, shoulder, or knee joints  EXTREM: no clubbing, cyanosis, no edema,normal  pulses   NEURO: grossly intact; motor WNL; AAOx3;  PSYCH: normal mood, affect and behavior  LYMPH: normal cervical, supraclavicular          Labs:   Lab Results   Component Value Date    WBC 8.4 02/19/2018    HGB 13.7 02/19/2018    HCT 43.6 02/19/2018    MCV 93.4 02/19/2018     02/19/2018    CMP  @LASTLAB(NA,K,CL,CO2,GLU,BUN,Creatinine,Calcium,PROT,Albumin,Bilitot,Alkphos,AST,ALT,CRP,ESR,RF,CCP,VANNA,SSA,CPK,uric acid) )@  I have reviewed all available lab results and radiology reports.    Radiology/Diagnostic Studies:        Assessment/Plan:   (1) 72 y.o. female with diagnosis of Rheumatoid arthritis. She is stable        CBC CMP CRP        Discussion:     I have explained all of the above in detail and the patient understands all of the current recommendation(s). I have answered all questions to the best of my  ability and to their complete satisfaction.       The patient is to continue with the current management plan         RTC in   3 months      Electronically signed by Manjinder Schultz MD

## 2018-05-23 ENCOUNTER — LAB VISIT (OUTPATIENT)
Dept: LAB | Facility: HOSPITAL | Age: 73
End: 2018-05-23
Attending: INTERNAL MEDICINE
Payer: MEDICARE

## 2018-05-23 DIAGNOSIS — M05.79 SEROPOSITIVE RHEUMATOID ARTHRITIS OF MULTIPLE SITES: Primary | ICD-10-CM

## 2018-05-23 LAB
ALBUMIN SERPL BCP-MCNC: 3.6 G/DL
ALP SERPL-CCNC: 85 U/L
ALT SERPL W/O P-5'-P-CCNC: 16 U/L
ANION GAP SERPL CALC-SCNC: 9 MMOL/L
AST SERPL-CCNC: 17 U/L
BASOPHILS # BLD AUTO: 0.08 K/UL
BASOPHILS NFR BLD: 1.1 %
BILIRUB SERPL-MCNC: 0.6 MG/DL
BUN SERPL-MCNC: 17 MG/DL
CALCIUM SERPL-MCNC: 9.4 MG/DL
CHLORIDE SERPL-SCNC: 104 MMOL/L
CO2 SERPL-SCNC: 27 MMOL/L
CREAT SERPL-MCNC: 0.8 MG/DL
CRP SERPL-MCNC: 1.9 MG/L
DIFFERENTIAL METHOD: ABNORMAL
EOSINOPHIL # BLD AUTO: 0.3 K/UL
EOSINOPHIL NFR BLD: 3.9 %
ERYTHROCYTE [DISTWIDTH] IN BLOOD BY AUTOMATED COUNT: 13.5 %
EST. GFR  (AFRICAN AMERICAN): >60 ML/MIN/1.73 M^2
EST. GFR  (NON AFRICAN AMERICAN): >60 ML/MIN/1.73 M^2
GLUCOSE SERPL-MCNC: 97 MG/DL
HCT VFR BLD AUTO: 44.4 %
HGB BLD-MCNC: 13.9 G/DL
IMM GRANULOCYTES # BLD AUTO: 0.03 K/UL
IMM GRANULOCYTES NFR BLD AUTO: 0.4 %
LYMPHOCYTES # BLD AUTO: 2.4 K/UL
LYMPHOCYTES NFR BLD: 31.4 %
MCH RBC QN AUTO: 29.9 PG
MCHC RBC AUTO-ENTMCNC: 31.3 G/DL
MCV RBC AUTO: 96 FL
MONOCYTES # BLD AUTO: 0.7 K/UL
MONOCYTES NFR BLD: 8.8 %
NEUTROPHILS # BLD AUTO: 4.1 K/UL
NEUTROPHILS NFR BLD: 54.4 %
NRBC BLD-RTO: 0 /100 WBC
PLATELET # BLD AUTO: 189 K/UL
PMV BLD AUTO: 10.5 FL
POTASSIUM SERPL-SCNC: 4.7 MMOL/L
PROT SERPL-MCNC: 7.8 G/DL
RBC # BLD AUTO: 4.65 M/UL
SODIUM SERPL-SCNC: 140 MMOL/L
WBC # BLD AUTO: 7.6 K/UL

## 2018-05-23 PROCEDURE — 80053 COMPREHEN METABOLIC PANEL: CPT

## 2018-05-23 PROCEDURE — 86140 C-REACTIVE PROTEIN: CPT

## 2018-05-23 PROCEDURE — 85025 COMPLETE CBC W/AUTO DIFF WBC: CPT

## 2018-05-24 ENCOUNTER — TELEPHONE (OUTPATIENT)
Dept: FAMILY MEDICINE | Facility: CLINIC | Age: 73
End: 2018-05-24

## 2018-05-24 DIAGNOSIS — E78.5 HYPERLIPIDEMIA, UNSPECIFIED HYPERLIPIDEMIA TYPE: Primary | ICD-10-CM

## 2018-05-24 RX ORDER — PRAVASTATIN SODIUM 40 MG/1
40 TABLET ORAL DAILY
Qty: 90 TABLET | Refills: 3 | Status: SHIPPED | OUTPATIENT
Start: 2018-05-24 | End: 2019-01-07

## 2018-05-24 NOTE — TELEPHONE ENCOUNTER
----- Message from Arturo Dawson MD sent at 5/23/2018  6:21 PM CDT -----  Blood sugar control is good and her A1c is no longer in the prediabetic range.  Her cholesterol levels are increasing with the Creswell risk score at 16.9.  It is recommended that she go on a statin.

## 2018-08-14 ENCOUNTER — OFFICE VISIT (OUTPATIENT)
Dept: RHEUMATOLOGY | Facility: CLINIC | Age: 73
End: 2018-08-14
Payer: MEDICARE

## 2018-08-14 VITALS
WEIGHT: 210.63 LBS | SYSTOLIC BLOOD PRESSURE: 148 MMHG | BODY MASS INDEX: 41.82 KG/M2 | DIASTOLIC BLOOD PRESSURE: 92 MMHG

## 2018-08-14 DIAGNOSIS — M05.79 RHEUMATOID ARTHRITIS INVOLVING MULTIPLE SITES WITH POSITIVE RHEUMATOID FACTOR: Primary | ICD-10-CM

## 2018-08-14 PROCEDURE — 99213 OFFICE O/P EST LOW 20 MIN: CPT | Mod: ,,, | Performed by: INTERNAL MEDICINE

## 2018-08-14 NOTE — PROGRESS NOTES
[facility] RHEUMATOLOGY            PROGRESS NOTE      Subjective:       Patient ID:   NAME: Ling Rapp : 1945     72 y.o. female    Referring Doc: No ref. provider found  Other Physicians:    Chief Complaint:  Rheumatoid Arthritis      History of Present Illness:     Patient returns today for a regularly scheduled follow-up visit for   Rheumatoid arthritis.    The patient is doing well. No prolonged morning stiffness or joint swelling. No significant arthralgias. No chest pains, cough or shortness of breath. No fatigue. No gastrointestinal complaints. No paresthesias.            ROS:   GEN:  No  fever, night sweats . weight is stable   No fatigue  SKIN: no rashes, no bruising, no ulcerations, no Raynaud's  HEENT: no HA's, No visual changes, no mucosal ulcers, no sicca symptoms,  CV:   no CP, SOB, PND, SEN, no orthopnea, no palpitations  PULM: normal with no SOB, cough, hemoptysis, sputum or pleuritic pain  GI:  no abdominal pain, nausea, vomiting, constipation, diarrhea, melanotic stools, BRBPR, hematemesis, no dysphagia  :   no dysuria  NEURO: no paresthesias, headaches, visual disturbances, muscle weakness  MUSCULOSKELETAL:no joint swelling, prolonged AM stiffness, no back pain, no muscle pain  Allergies:  Review of patient's allergies indicates:   Allergen Reactions    Amoxicillin-pot clavulanate Diarrhea and Other (See Comments)     Sever cramping    Diltiazem Other (See Comments)     Extreme dry moth    Fenofibrate Other (See Comments)     Other reaction(s): Itching    Hydroxychloroquine      Other reaction(s): eye accomodation problems    Leflunomide      Other reaction(s): abd pains    Lisinopril-hydrochlorothiazide      Other reaction(s): diarrhea  Other reaction(s): cramps    Methotrexate      Other reaction(s): pancreatitis    Norvasc [amlodipine] Swelling    Sulfa (sulfonamide antibiotics)      Other reaction(s): Swelling  Other reaction(s): Hives     Sulfamethoxazole-trimethoprim        Medications:    Current Outpatient Medications:     albuterol (PROAIR HFA) 90 mcg/actuation inhaler, Inhale 2 puffs into the lungs every 6 (six) hours as needed for Wheezing., Disp: 3 Inhaler, Rfl: 5    artificial saliva, yerbas-lyt, SprA, Use as directed, Disp: 240 mL, Rfl: 11    aspirin 81 mg Tab, Take 81 mg by mouth once daily. Every day, Disp: , Rfl:     atenolol (TENORMIN) 100 MG tablet, TAKE 1 TABLET DAILY, Disp: 90 tablet, Rfl: 2    azelastine (ASTELIN) 137 mcg (0.1 %) nasal spray, 1 spray by Nasal route 2 (two) times daily., Disp: , Rfl:     beclomethasone (QVAR) 80 mcg/actuation Aero, Inhale 1 puff into the lungs 2 (two) times daily. Controller, Disp: , Rfl:     CALCIUM CARBONATE/VITAMIN D3 (OS-ADDY 500 + D) 500-125 mg-unit Tab, Take 1 tablet by mouth once daily. Twice a day, Disp: , Rfl:     cetirizine (ZYRTEC) 10 MG tablet, Take 10 mg by mouth once daily. Every day, Disp: , Rfl:     cholecalciferol, vitamin D3, 5,000 unit capsule, Take 5,000 Units by mouth once daily. 5 a week, Disp: , Rfl:     cyanocobalamin (VITAMIN B-12) 100 MCG tablet, Take 100 mcg by mouth once daily., Disp: , Rfl:     diclofenac sodium (VOLTAREN) 1 % Gel, Apply 2 g topically once daily., Disp: , Rfl:     etanercept (ENBREL) 50 mg/mL (0.98 mL) injection, 50 mg once a week. once weekly, Disp: , Rfl:     fluticasone (FLONASE) 50 mcg/actuation nasal spray, 2 sprays by Each Nare route once daily. Every day, Disp: 48 g, Rfl: 3    Lactobacillus rhamnosus GG (CULTURELLE) 10 billion cell capsule, Take 1 capsule by mouth once daily., Disp: , Rfl:     magnesium oxide (MAG-OX) 400 mg tablet, Take 400 mg by mouth once daily., Disp: , Rfl:     montelukast (SINGULAIR) 10 mg tablet, Take 1 tablet (10 mg total) by mouth every evening., Disp: 90 tablet, Rfl: 1    omeprazole (PRILOSEC) 40 MG capsule, TAKE 1 CAPSULE TWICE DAILY BEFORE MEALS, Disp: 180 capsule, Rfl: 2    pravastatin (PRAVACHOL) 40 MG  tablet, Take 1 tablet (40 mg total) by mouth once daily., Disp: 90 tablet, Rfl: 3    salsalate (DISALCID) 500 MG tablet, 500 mg daily as needed. Twice a day, Disp: , Rfl:     tramadol (ULTRAM) 50 mg tablet, Take  mg by mouth every 6 to 8 hours as needed. , Disp: , Rfl:     UBIDECARENONE (COQ-10 ORAL), Take 1 capsule by mouth once daily. , Disp: , Rfl:     benzonatate (TESSALON) 200 MG capsule, Take 200 mg by mouth 3 (three) times daily as needed for Cough., Disp: , Rfl:     PMHx/PSHx Updates:      Objective:     Vitals:  Blood pressure (!) 148/92, weight 95.5 kg (210 lb 9.6 oz).    Physical Examination:   GEN: no apparent distress, comfortable; AAOx3  SKIN: no rashes,no ulceration, no Raynaud's, no petechiae, no SQ nodules,  HEAD: normal  EYES: no pallor, no icterus,  NECK: no masses, thyroid normal, trachea midline, no LAD/LN's, supple  CV: RRR with no murmur; l S1 and S2 reg. ,no gallop no rubs,   CHEST: Normal respiratory effort; CTAB; normal breath sounds; no wheeze or crackles  MUSC/Skeletal: ROM normal; no crepitus; joints without synovitis,   No joint swelling or tenderness of PIP, MCP, wrist, elbow, shoulder, or knee joints  EXTREM: no clubbing, cyanosis, no edema,normal  pulses   NEURO: grossly intact; motor WNL; AAOx3;   PSYCH: normal mood, affect and behavior  LYMPH: normal cervical, supraclavicular          Labs:   Lab Results   Component Value Date    WBC 7.60 05/23/2018    HGB 13.9 05/23/2018    HCT 44.4 05/23/2018    MCV 96 05/23/2018     05/23/2018    CMP  @LASTLAB(NA,K,CL,CO2,GLU,BUN,Creatinine,Calcium,PROT,Albumin,Bilitot,Alkphos,AST,ALT,CRP,ESR,RF,CCP,VANNA,SSA,CPK,uric acid) )@  I have reviewed all available lab results and radiology reports.    Radiology/Diagnostic Studies:    Last TB Gold February 2018. It was negative    Assessment/Plan:   (1) 72 y.o. female with diagnosis of rheumatoid arthritis. This is stable.        CBC CMP CRP        Discussion:     I have explained all of  the above in detail and the patient understands all of the current recommendation(s). I have answered all questions to the best of my ability and to their complete satisfaction.       The patient is to continue with the current management plan         RTC in   4 months or before if needed      Electronically signed by Manjinder Schultz MD

## 2018-08-23 ENCOUNTER — LAB VISIT (OUTPATIENT)
Dept: LAB | Facility: HOSPITAL | Age: 73
End: 2018-08-23
Attending: INTERNAL MEDICINE
Payer: MEDICARE

## 2018-08-23 DIAGNOSIS — M05.79 SEROPOSITIVE RHEUMATOID ARTHRITIS OF MULTIPLE SITES: Primary | ICD-10-CM

## 2018-08-23 LAB
ALBUMIN SERPL BCP-MCNC: 3.7 G/DL
ALP SERPL-CCNC: 88 U/L
ALT SERPL W/O P-5'-P-CCNC: 17 U/L
ANION GAP SERPL CALC-SCNC: 8 MMOL/L
AST SERPL-CCNC: 18 U/L
BASOPHILS # BLD AUTO: 0.05 K/UL
BASOPHILS NFR BLD: 0.8 %
BILIRUB SERPL-MCNC: 0.9 MG/DL
BUN SERPL-MCNC: 14 MG/DL
CALCIUM SERPL-MCNC: 9.2 MG/DL
CHLORIDE SERPL-SCNC: 102 MMOL/L
CO2 SERPL-SCNC: 27 MMOL/L
CREAT SERPL-MCNC: 0.8 MG/DL
CRP SERPL-MCNC: 1.8 MG/L
DIFFERENTIAL METHOD: ABNORMAL
EOSINOPHIL # BLD AUTO: 0.2 K/UL
EOSINOPHIL NFR BLD: 3.6 %
ERYTHROCYTE [DISTWIDTH] IN BLOOD BY AUTOMATED COUNT: 13 %
EST. GFR  (AFRICAN AMERICAN): >60 ML/MIN/1.73 M^2
EST. GFR  (NON AFRICAN AMERICAN): >60 ML/MIN/1.73 M^2
GLUCOSE SERPL-MCNC: 145 MG/DL
HCT VFR BLD AUTO: 43.6 %
HGB BLD-MCNC: 13.7 G/DL
IMM GRANULOCYTES # BLD AUTO: 0.02 K/UL
IMM GRANULOCYTES NFR BLD AUTO: 0.3 %
LYMPHOCYTES # BLD AUTO: 2.1 K/UL
LYMPHOCYTES NFR BLD: 32.3 %
MCH RBC QN AUTO: 29.8 PG
MCHC RBC AUTO-ENTMCNC: 31.4 G/DL
MCV RBC AUTO: 95 FL
MONOCYTES # BLD AUTO: 0.5 K/UL
MONOCYTES NFR BLD: 8.4 %
NEUTROPHILS # BLD AUTO: 3.5 K/UL
NEUTROPHILS NFR BLD: 54.6 %
NRBC BLD-RTO: 0 /100 WBC
PLATELET # BLD AUTO: 190 K/UL
PMV BLD AUTO: 10.7 FL
POTASSIUM SERPL-SCNC: 4.3 MMOL/L
PROT SERPL-MCNC: 7.7 G/DL
RBC # BLD AUTO: 4.6 M/UL
SODIUM SERPL-SCNC: 137 MMOL/L
WBC # BLD AUTO: 6.44 K/UL

## 2018-08-23 PROCEDURE — 86140 C-REACTIVE PROTEIN: CPT

## 2018-08-23 PROCEDURE — 85025 COMPLETE CBC W/AUTO DIFF WBC: CPT

## 2018-08-23 PROCEDURE — 80053 COMPREHEN METABOLIC PANEL: CPT

## 2018-08-23 PROCEDURE — 36415 COLL VENOUS BLD VENIPUNCTURE: CPT | Mod: PO

## 2018-09-25 ENCOUNTER — TELEPHONE (OUTPATIENT)
Dept: FAMILY MEDICINE | Facility: CLINIC | Age: 73
End: 2018-09-25

## 2018-09-25 DIAGNOSIS — Z12.31 SCREENING MAMMOGRAM, ENCOUNTER FOR: Primary | ICD-10-CM

## 2018-09-25 NOTE — TELEPHONE ENCOUNTER
----- Message from Jeff Kidd sent at 9/25/2018 10:30 AM CDT -----  Contact: Patient  Patient needs an order for a bi-lateral mammogram, please call the patient when the order is in the system at [phone].         Thanks

## 2018-09-25 NOTE — TELEPHONE ENCOUNTER
----- Message from Kassidy Veliz sent at 2018 10:25 AM CDT -----  Contact: [phone]  Pt called to schedule flu shot for 10/8 morning for her and ..     44  Daquan Rapp      It would not allow me to schedule appt    Thank you!

## 2018-09-26 ENCOUNTER — LAB VISIT (OUTPATIENT)
Dept: LAB | Facility: HOSPITAL | Age: 73
End: 2018-09-26
Attending: FAMILY MEDICINE
Payer: MEDICARE

## 2018-09-26 DIAGNOSIS — E78.5 HYPERLIPIDEMIA, UNSPECIFIED HYPERLIPIDEMIA TYPE: ICD-10-CM

## 2018-09-26 LAB
ALBUMIN SERPL BCP-MCNC: 3.7 G/DL
ALP SERPL-CCNC: 86 U/L
ALT SERPL W/O P-5'-P-CCNC: 19 U/L
AST SERPL-CCNC: 23 U/L
BILIRUB DIRECT SERPL-MCNC: 0.3 MG/DL
BILIRUB SERPL-MCNC: 1 MG/DL
CHOLEST SERPL-MCNC: 194 MG/DL
CHOLEST/HDLC SERPL: 4.3 {RATIO}
HDLC SERPL-MCNC: 45 MG/DL
HDLC SERPL: 23.2 %
LDLC SERPL CALC-MCNC: 93.8 MG/DL
NONHDLC SERPL-MCNC: 149 MG/DL
PROT SERPL-MCNC: 7.8 G/DL
TRIGL SERPL-MCNC: 276 MG/DL

## 2018-09-26 PROCEDURE — 36415 COLL VENOUS BLD VENIPUNCTURE: CPT | Mod: PO

## 2018-09-26 PROCEDURE — 80076 HEPATIC FUNCTION PANEL: CPT

## 2018-09-26 PROCEDURE — 80061 LIPID PANEL: CPT

## 2018-10-08 ENCOUNTER — HOSPITAL ENCOUNTER (OUTPATIENT)
Dept: RADIOLOGY | Facility: CLINIC | Age: 73
Discharge: HOME | End: 2018-10-08
Attending: FAMILY MEDICINE
Payer: MEDICARE

## 2018-10-08 ENCOUNTER — CLINICAL SUPPORT (OUTPATIENT)
Dept: FAMILY MEDICINE | Facility: CLINIC | Age: 73
End: 2018-10-08
Attending: FAMILY MEDICINE
Payer: MEDICARE

## 2018-10-08 DIAGNOSIS — Z23 IMMUNIZATION DUE: Primary | ICD-10-CM

## 2018-10-08 DIAGNOSIS — Z12.31 SCREENING MAMMOGRAM, ENCOUNTER FOR: ICD-10-CM

## 2018-10-08 PROCEDURE — 77067 SCR MAMMO BI INCL CAD: CPT | Mod: 26,,, | Performed by: RADIOLOGY

## 2018-10-08 PROCEDURE — 90662 IIV NO PRSV INCREASED AG IM: CPT | Mod: PBBFAC,PO

## 2018-10-08 PROCEDURE — 77063 BREAST TOMOSYNTHESIS BI: CPT | Mod: 26,,, | Performed by: RADIOLOGY

## 2018-10-08 PROCEDURE — 77063 BREAST TOMOSYNTHESIS BI: CPT | Mod: TC,PO

## 2018-10-08 PROCEDURE — 77067 SCR MAMMO BI INCL CAD: CPT | Mod: TC,PO

## 2018-10-10 ENCOUNTER — TELEPHONE (OUTPATIENT)
Dept: PULMONOLOGY | Facility: CLINIC | Age: 73
End: 2018-10-10

## 2018-10-10 DIAGNOSIS — G47.33 OSA (OBSTRUCTIVE SLEEP APNEA): Primary | ICD-10-CM

## 2018-10-19 ENCOUNTER — TELEPHONE (OUTPATIENT)
Dept: PULMONOLOGY | Facility: CLINIC | Age: 73
End: 2018-10-19

## 2018-10-19 DIAGNOSIS — J45.40 MODERATE PERSISTENT ASTHMA, UNSPECIFIED WHETHER COMPLICATED: Primary | ICD-10-CM

## 2018-10-19 NOTE — TELEPHONE ENCOUNTER
Pt had CPAP titration on 10/17 and she also said she has had a dry cough for about two weeks now is there something she can take like a breathing treatment or something.  --DG

## 2018-10-22 ENCOUNTER — TELEPHONE (OUTPATIENT)
Dept: PULMONOLOGY | Facility: CLINIC | Age: 73
End: 2018-10-22

## 2018-10-22 ENCOUNTER — DOCUMENTATION ONLY (OUTPATIENT)
Dept: PULMONOLOGY | Facility: CLINIC | Age: 73
End: 2018-10-22

## 2018-10-22 DIAGNOSIS — G47.33 OSA (OBSTRUCTIVE SLEEP APNEA): Primary | ICD-10-CM

## 2018-10-22 NOTE — TELEPHONE ENCOUNTER
CPAP titration done on 10/17/2018 shows that the patient's obstructive sleep apnea and nocturnal hypoxemia are corrected with 12 cm of CPAP with 2 L of oxygen bled in using a small ResMed F 20 full facemask and heated humidity.

## 2018-10-23 RX ORDER — PREDNISONE 10 MG/1
10 TABLET ORAL DAILY
Qty: 20 TABLET | Refills: 0 | Status: SHIPPED | OUTPATIENT
Start: 2018-10-23 | End: 2018-11-02

## 2018-10-23 RX ORDER — ALBUTEROL SULFATE 1.25 MG/3ML
1.25 SOLUTION RESPIRATORY (INHALATION) EVERY 6 HOURS PRN
Qty: 1 BOX | Refills: 3 | Status: SHIPPED | OUTPATIENT
Start: 2018-10-23 | End: 2019-10-23

## 2018-10-23 NOTE — TELEPHONE ENCOUNTER
I spoke with the patient she is using Qvar 2 puffs twice a day, the Breo was too expensive.  She has been coughing for a while, non productive, no fever.  She feels it has stemmed from her allergies.   I sent her a short prednisone taper. I also ordered a nebulizer with albuterol for her.

## 2018-11-13 ENCOUNTER — OFFICE VISIT (OUTPATIENT)
Dept: PULMONOLOGY | Facility: CLINIC | Age: 73
End: 2018-11-13
Payer: MEDICARE

## 2018-11-13 VITALS
SYSTOLIC BLOOD PRESSURE: 140 MMHG | DIASTOLIC BLOOD PRESSURE: 80 MMHG | OXYGEN SATURATION: 95 % | HEIGHT: 60 IN | WEIGHT: 212 LBS | BODY MASS INDEX: 41.62 KG/M2 | HEART RATE: 69 BPM

## 2018-11-13 DIAGNOSIS — R91.1 LUNG NODULE: ICD-10-CM

## 2018-11-13 DIAGNOSIS — M05.79 RHEUMATOID ARTHRITIS INVOLVING MULTIPLE SITES WITH POSITIVE RHEUMATOID FACTOR: ICD-10-CM

## 2018-11-13 DIAGNOSIS — J45.40 MODERATE PERSISTENT ASTHMA WITHOUT COMPLICATION: ICD-10-CM

## 2018-11-13 DIAGNOSIS — G47.33 OSA (OBSTRUCTIVE SLEEP APNEA): Primary | ICD-10-CM

## 2018-11-13 PROCEDURE — 99214 OFFICE O/P EST MOD 30 MIN: CPT | Mod: ,,, | Performed by: NURSE PRACTITIONER

## 2018-11-13 NOTE — PROGRESS NOTES
SUBJECTIVE:    Patient ID: Ling Rapp is a 73 y.o. female.    Chief Complaint: Cough    HPI   Patient here today complaints of cough that has never gone away.  She is using Qvar for her Asthma because Breo too expensive but has never tried it.  Her peak flow is 400 today.  She had her sleep study and CPAP titration but has not received her equipment yet, it has been since October she did not think to call about it.  She is wearing oxygen all the time.  She had an overnight pulse ox that Chad did last week which was only done for 2 hours on 2 liters. Her CT scan in September showed no change in pulmonary nodule since 2016.  Her PFTs showed a mild diffusion defect.  She feels that her RA is being managed well.   Past Medical History:   Diagnosis Date    ALLERGIC RHINITIS     Anticoagulant long-term use     Asthma     Cataract     Former smoker     GERD (gastroesophageal reflux disease)     Hemorrhoids     Hypertension     DANE (obstructive sleep apnea)     Osteopenia     Pancreatitis     secondary to severe hypertriglyceridemia    Peptic ulcer     Rheumatoid arthritis     Vitamin D deficiency      Past Surgical History:   Procedure Laterality Date    Athroscopy(knee)      BREAST BIOPSY Left 2017    benign    CARDIAC CATHETERIZATION      CATARACT EXTRACTION Left 2013    COLONOSCOPY N/A 3/30/2016    Procedure: COLONOSCOPY;  Surgeon: Thee Sorenson MD;  Location: [facility];  Service: Endoscopy;  Laterality: N/A;    COLONOSCOPY N/A 3/30/2016    Performed by [facility]e [facility]son MD at [facility]    EGD (ESOPHAGOGASTRODUODENOSCOPY) N/A 5/21/2013    Performed by [facility] at [facility] ENDO    EYE SURGERY      cataracts bilateral     FRACTURE SURGERY  1997    right knee     HYSTERECTOMY  1982    PARTIAL HYSTERECTOMY      w/ USO    TONSILLECTOMY      UPPER GASTROINTESTINAL ENDOSCOPY  5/2013     Family History   Problem Relation Age of Onset    Colon polyps Sister 64     Ulcers Father     Emphysema Father         smoker    Alcohol abuse Father     Thyroid disease Mother     Heart disease Mother     Emphysema Mother         smoker    Alzheimer's disease Brother     Stroke Brother     Cancer Brother     Throat cancer Brother         non smoker    No Known Problems Son     Diabetes Maternal Uncle         1/2 brother    Cancer Paternal Aunt     Early death Paternal Aunt     Leukemia Paternal Aunt         highschool    Breast cancer Paternal Aunt     Heart disease Paternal Uncle     Rheum arthritis Maternal Grandmother     Pneumonia Maternal Grandfather     Cancer Paternal Grandmother     Breast cancer Paternal Grandmother     Early death Paternal Grandfather          killed in line of duty        Social History:   Marital Status:   Occupation:retired    Alcohol History:  reports that she does not drink alcohol.  Tobacco History:  reports that she quit smoking about 46 years ago. Her smoking use included cigarettes. She has a 10.00 pack-year smoking history. she has never used smokeless tobacco.  Drug History:  reports that she does not use drugs.    Review of patient's allergies indicates:   Allergen Reactions    Amoxicillin-pot clavulanate Diarrhea and Other (See Comments)     Sever cramping    Diltiazem Other (See Comments)     Extreme dry moth    Fenofibrate Other (See Comments)     Other reaction(s): Itching    Hydroxychloroquine      Other reaction(s): eye accomodation problems    Leflunomide      Other reaction(s): abd pains    Lisinopril-hydrochlorothiazide      Other reaction(s): diarrhea  Other reaction(s): cramps    Methotrexate      Other reaction(s): pancreatitis    Norvasc [amlodipine] Swelling    Sulfa (sulfonamide antibiotics)      Other reaction(s): Swelling  Other reaction(s): Hives    Sulfamethoxazole-trimethoprim        Current Outpatient Medications   Medication Sig Dispense Refill    albuterol (ACCUNEB) 1.25 mg/3 mL  Nebu Take 3 mLs (1.25 mg total) by nebulization every 6 (six) hours as needed (shortness of breath, or wheezing). Rescue 1 Box 3    albuterol (PROAIR HFA) 90 mcg/actuation inhaler Inhale 2 puffs into the lungs every 6 (six) hours as needed for Wheezing. 3 Inhaler 5    artificial saliva, yerbas-lyt, SprA Use as directed 240 mL 11    aspirin 81 mg Tab Take 81 mg by mouth once daily. Every day      atenolol (TENORMIN) 100 MG tablet TAKE 1 TABLET DAILY 90 tablet 2    azelastine (ASTELIN) 137 mcg (0.1 %) nasal spray 1 spray by Nasal route 2 (two) times daily.      beclomethasone (QVAR) 80 mcg/actuation Aero Inhale 1 puff into the lungs 2 (two) times daily. Controller      benzonatate (TESSALON) 200 MG capsule Take 200 mg by mouth 3 (three) times daily as needed for Cough.      CALCIUM CARBONATE/VITAMIN D3 (OS-ADDY 500 + D) 500-125 mg-unit Tab Take 1 tablet by mouth once daily. Twice a day      cetirizine (ZYRTEC) 10 MG tablet Take 10 mg by mouth once daily. Every day      cholecalciferol, vitamin D3, 5,000 unit capsule Take 5,000 Units by mouth once daily. 5 a week      cyanocobalamin (VITAMIN B-12) 100 MCG tablet Take 100 mcg by mouth once daily.      diclofenac sodium (VOLTAREN) 1 % Gel Apply 2 g topically once daily.      etanercept (ENBREL) 50 mg/mL (0.98 mL) injection 50 mg once a week. once weekly      fluticasone (FLONASE) 50 mcg/actuation nasal spray 2 sprays by Each Nare route once daily. Every day 48 g 3    Lactobacillus rhamnosus GG (CULTURELLE) 10 billion cell capsule Take 1 capsule by mouth once daily.      magnesium oxide (MAG-OX) 400 mg tablet Take 400 mg by mouth once daily.      montelukast (SINGULAIR) 10 mg tablet Take 1 tablet (10 mg total) by mouth every evening. 90 tablet 1    omeprazole (PRILOSEC) 40 MG capsule TAKE 1 CAPSULE TWICE DAILY BEFORE MEALS 180 capsule 2    pravastatin (PRAVACHOL) 40 MG tablet Take 1 tablet (40 mg total) by mouth once daily. 90 tablet 3    salsalate  (DISALCID) 500 MG tablet 500 mg daily as needed. Twice a day      tramadol (ULTRAM) 50 mg tablet Take  mg by mouth every 6 to 8 hours as needed.       UBIDECARENONE (COQ-10 ORAL) Take 1 capsule by mouth once daily.        No current facility-administered medications for this visit.        Last PFT: 09/17/2018  Last CT: 09/06/2018    Review of Systems  General: Feeling Well.  Eyes: Vision is good.  ENT:  Allergies, has had allergy shots in past   Heart:: No chest pain or palpitations.  Lungs: coughing all throughout the day   GI: reflux controlled with Prilosec  : No dysuria, hesitancy, or nocturia.  Musculoskeletal: No joint pain or myalgias.  Skin: No lesions or rashes.  Neuro: No headaches or neuropathy.  Lymph: No edema or adenopathy.  Psych: No anxiety or depression.  Endo: No weight change.    OBJECTIVE:      BP (!) 140/80 (BP Location: Left arm, Patient Position: Sitting)   Pulse 69   Ht 5' (1.524 m)   Wt 96.2 kg (212 lb)   SpO2 95% Comment: 2LPM pulse dose   L/min   BMI 41.40 kg/m²     Physical Exam  GENERAL: Older patient in no distress.  HEENT: Pupils equal and reactive. Extraocular movements intact. Nose intact.      Pharynx moist.  NECK: Supple.   HEART: Regular rate and rhythm. No murmur or gallop auscultated.  LUNGS: Clear to auscultation and percussion. Lung excursion symmetrical. No change in fremitus. No adventitial noises.  ABDOMEN: Bowel sounds present. Non-tender, no masses palpated.  EXTREMITIES: Normal muscle tone and joint movement, early clubbing to some nails    LYMPHATICS: No adenopathy palpated, no edema.  SKIN: Dry, intact, no lesions.   NEURO: Cranial nerves II-XII intact. Motor strength 5/5 bilaterally, upper and lower extremities.  PSYCH: Appropriate affect.    Assessment:       1. DANE (obstructive sleep apnea)    2. Moderate persistent asthma without complication    3. Rheumatoid arthritis involving multiple sites with positive rheumatoid factor    4. Lung nodule           Plan:       DANE (obstructive sleep apnea)  -     CPAP FOR HOME USE  -     CPAP FOR HOME USE    Moderate persistent asthma without complication    Rheumatoid arthritis involving multiple sites with positive rheumatoid factor    Lung nodule    Try Breo 1 puff a day, rinse after you use it (call with update on cough in a week)  Needs CPAP   Bring your chip for CPAP compliance in 3 months  Stay on your Prilosec, singulair,  and nose sprays     Follow-up in about 3 months (around 2/13/2019).

## 2018-11-13 NOTE — PATIENT INSTRUCTIONS
Obstructive Sleep Apnea  Obstructive sleep apnea is a condition that causes your air passages to become narrowed or blocked during sleep. As a result, breathing stops for short periods. Your body wakes up enough for breathing to begin again, though you don't remember it. The cycle of stopped breathing and brief awakenings can repeat dozens of times a night. This prevents the body from getting to the deeper stages of sleep that are needed for good rest and may cause your body's oxygen level to fall.  Signs of sleep apnea include loud snoring, noisy breathing, and gasping sounds during sleep. Daytime symptoms include waking up tired after a full night's sleep, waking up with headaches, feeling very sleepy or falling asleep during the day, and having problems with memory or concentration.  Risk factors for sleep apnea include:  · Being overweight  · Being a man, or a woman in menopause  · Smoking  · Using alcohol or sedating medicines  · Having enlarged structures in the nose or throat  Home care  Lifestyle changes that can help treat snoring and sleep apnea include the following:  · If you are overweight, lose weight. Talk to your healthcare provider about a weight-loss plan for you.  · Avoid alcohol for 3 to 4 hours before bedtime. Avoid sedating medications. Ask your healthcare provider about the medicines you take.  · If you smoke, talk to your healthcare provider about ways to quit.  · Sleep on your side. This can help prevent gravity from pulling relaxed throat tissues into your breathing passages.  · If you have allergies or sinus problems that block your nose, ask your healthcare provider for help.  Follow-up care  Follow up with your healthcare provider, or as advised. A diagnosis of sleep apnea is made with a sleep study. Your healthcare provider can tell you more about this test.  When to seek medical advice  Sleep apnea can make you more likely to have certain health problems. These include high blood  pressure, heart attack, stroke, and sexual dysfunction. If you have sleep apnea, talk to your healthcare provider about the best treatments for you.  Date Last Reviewed: 4/1/2017  © 3622-7571 [employer]. [address] [place] [address], [place] [address]. All rights reserved. This information is not intended as a substitute for professional medical care. Always follow your healthcare professional's instructions.      Try Breo 1 puff a day, rinse after you use it (call with update on cough in a week)  Needs CPAP   Bring your chip for CPAP compliance in 3 months

## 2018-12-04 ENCOUNTER — OFFICE VISIT (OUTPATIENT)
Dept: RHEUMATOLOGY | Facility: CLINIC | Age: 73
End: 2018-12-04
Payer: MEDICARE

## 2018-12-04 ENCOUNTER — LAB VISIT (OUTPATIENT)
Dept: LAB | Facility: HOSPITAL | Age: 73
End: 2018-12-04
Attending: INTERNAL MEDICINE
Payer: MEDICARE

## 2018-12-04 VITALS
WEIGHT: 209.31 LBS | SYSTOLIC BLOOD PRESSURE: 136 MMHG | BODY MASS INDEX: 40.88 KG/M2 | DIASTOLIC BLOOD PRESSURE: 72 MMHG

## 2018-12-04 DIAGNOSIS — M05.79 SEROPOSITIVE RHEUMATOID ARTHRITIS OF MULTIPLE SITES: Primary | ICD-10-CM

## 2018-12-04 DIAGNOSIS — M05.79 RHEUMATOID ARTHRITIS INVOLVING MULTIPLE SITES WITH POSITIVE RHEUMATOID FACTOR: Primary | ICD-10-CM

## 2018-12-04 LAB
ALBUMIN SERPL BCP-MCNC: 3.5 G/DL
ALP SERPL-CCNC: 88 U/L
ALT SERPL W/O P-5'-P-CCNC: 17 U/L
ANION GAP SERPL CALC-SCNC: 5 MMOL/L
AST SERPL-CCNC: 18 U/L
BASOPHILS # BLD AUTO: 0.04 K/UL
BASOPHILS NFR BLD: 0.5 %
BILIRUB SERPL-MCNC: 0.8 MG/DL
BUN SERPL-MCNC: 17 MG/DL
CALCIUM SERPL-MCNC: 9.1 MG/DL
CHLORIDE SERPL-SCNC: 105 MMOL/L
CO2 SERPL-SCNC: 29 MMOL/L
CREAT SERPL-MCNC: 0.7 MG/DL
CRP SERPL-MCNC: 1.7 MG/L
DIFFERENTIAL METHOD: ABNORMAL
EOSINOPHIL # BLD AUTO: 0.3 K/UL
EOSINOPHIL NFR BLD: 3.6 %
ERYTHROCYTE [DISTWIDTH] IN BLOOD BY AUTOMATED COUNT: 13.1 %
EST. GFR  (AFRICAN AMERICAN): >60 ML/MIN/1.73 M^2
EST. GFR  (NON AFRICAN AMERICAN): >60 ML/MIN/1.73 M^2
GLUCOSE SERPL-MCNC: 90 MG/DL
HCT VFR BLD AUTO: 42.3 %
HGB BLD-MCNC: 13.4 G/DL
IMM GRANULOCYTES # BLD AUTO: 0.02 K/UL
IMM GRANULOCYTES NFR BLD AUTO: 0.2 %
LYMPHOCYTES # BLD AUTO: 2.9 K/UL
LYMPHOCYTES NFR BLD: 34.2 %
MCH RBC QN AUTO: 29.3 PG
MCHC RBC AUTO-ENTMCNC: 31.7 G/DL
MCV RBC AUTO: 93 FL
MONOCYTES # BLD AUTO: 0.8 K/UL
MONOCYTES NFR BLD: 8.9 %
NEUTROPHILS # BLD AUTO: 4.5 K/UL
NEUTROPHILS NFR BLD: 52.6 %
NRBC BLD-RTO: 0 /100 WBC
PLATELET # BLD AUTO: 198 K/UL
PMV BLD AUTO: 10.5 FL
POTASSIUM SERPL-SCNC: 4 MMOL/L
PROT SERPL-MCNC: 7.6 G/DL
RBC # BLD AUTO: 4.57 M/UL
SODIUM SERPL-SCNC: 139 MMOL/L
WBC # BLD AUTO: 8.52 K/UL

## 2018-12-04 PROCEDURE — 99213 OFFICE O/P EST LOW 20 MIN: CPT | Mod: ,,, | Performed by: INTERNAL MEDICINE

## 2018-12-04 PROCEDURE — 36415 COLL VENOUS BLD VENIPUNCTURE: CPT | Mod: PO

## 2018-12-04 PROCEDURE — 85025 COMPLETE CBC W/AUTO DIFF WBC: CPT

## 2018-12-04 PROCEDURE — 86140 C-REACTIVE PROTEIN: CPT

## 2018-12-04 PROCEDURE — 80053 COMPREHEN METABOLIC PANEL: CPT

## 2018-12-04 NOTE — PROGRESS NOTES
[facility] RHEUMATOLOGY            PROGRESS NOTE      Subjective:       Patient ID:   NAME: Ling Rapp : 1945     73 y.o. female    Referring Doc: No ref. provider found  Other Physicians:    Chief Complaint:  Rheumatoid Arthritis      History of Present Illness:     Patient returns today for a regularly scheduled ,follow-up visit for  Rheumatoid arthritis and osteoarthritis     The patient is doing well. No prolonged morning stiffness or joint swelling ; occasional arthralgias relieved with medication. No fevers, cough or shortness of breath. No GI complaints.            ROS:   GEN:  No  fever, night sweats . weight is stable   No fatigue  SKIN: no rashes, no bruising, no ulcerations, no Raynaud's  HEENT: no HA's, No visual changes, no mucosal ulcers, no sicca symptoms,  CV:   no CP, SOB, PND, SEN, no orthopnea, no palpitations  PULM: normal with no SOB, =+ occcough, hemoptysis, sputum or pleuritic pain  GI:  no abdominal pain, nausea, vomiting, constipation, diarrhea, melanotic stools, BRBPR, hematemesis, no dysphagia  :   no dysuria  NEURO: no paresthesias, headaches, visual disturbances, muscle weakness  MUSCULOSKELETAL:no joint swelling, prolonged AM stiffness, + occ mild back pain, no muscle pain  Allergies:  Review of patient's allergies indicates:   Allergen Reactions    Amoxicillin-pot clavulanate Diarrhea and Other (See Comments)     Sever cramping    Diltiazem Other (See Comments)     Extreme dry moth    Fenofibrate Other (See Comments)     Other reaction(s): Itching    Hydroxychloroquine      Other reaction(s): eye accomodation problems    Leflunomide      Other reaction(s): abd pains    Lisinopril-hydrochlorothiazide      Other reaction(s): diarrhea  Other reaction(s): cramps    Methotrexate      Other reaction(s): pancreatitis    Norvasc [amlodipine] Swelling    Sulfa (sulfonamide antibiotics)      Other reaction(s): Swelling  Other reaction(s): Hives     Sulfamethoxazole-trimethoprim        Medications:    Current Outpatient Medications:     albuterol (ACCUNEB) 1.25 mg/3 mL Nebu, Take 3 mLs (1.25 mg total) by nebulization every 6 (six) hours as needed (shortness of breath, or wheezing). Rescue, Disp: 1 Box, Rfl: 3    albuterol (PROAIR HFA) 90 mcg/actuation inhaler, Inhale 2 puffs into the lungs every 6 (six) hours as needed for Wheezing., Disp: 3 Inhaler, Rfl: 5    artificial saliva, yerbas-lyt, SprA, Use as directed, Disp: 240 mL, Rfl: 11    aspirin 81 mg Tab, Take 81 mg by mouth once daily. Every day, Disp: , Rfl:     atenolol (TENORMIN) 100 MG tablet, TAKE 1 TABLET DAILY, Disp: 90 tablet, Rfl: 2    azelastine (ASTELIN) 137 mcg (0.1 %) nasal spray, 1 spray by Nasal route 2 (two) times daily., Disp: , Rfl:     beclomethasone (QVAR) 80 mcg/actuation Aero, Inhale 1 puff into the lungs 2 (two) times daily. Controller, Disp: , Rfl:     benzonatate (TESSALON) 200 MG capsule, Take 200 mg by mouth 3 (three) times daily as needed for Cough., Disp: , Rfl:     CALCIUM CARBONATE/VITAMIN D3 (OS-ADDY 500 + D) 500-125 mg-unit Tab, Take 1 tablet by mouth once daily. Twice a day, Disp: , Rfl:     cetirizine (ZYRTEC) 10 MG tablet, Take 10 mg by mouth once daily. Every day, Disp: , Rfl:     cholecalciferol, vitamin D3, 5,000 unit capsule, Take 5,000 Units by mouth once daily. 5 a week, Disp: , Rfl:     cyanocobalamin (VITAMIN B-12) 100 MCG tablet, Take 100 mcg by mouth once daily., Disp: , Rfl:     diclofenac sodium (VOLTAREN) 1 % Gel, Apply 2 g topically once daily., Disp: , Rfl:     etanercept (ENBREL) 50 mg/mL (0.98 mL) injection, 50 mg once a week. once weekly, Disp: , Rfl:     fluticasone (FLONASE) 50 mcg/actuation nasal spray, 2 sprays by Each Nare route once daily. Every day, Disp: 48 g, Rfl: 3    Lactobacillus rhamnosus GG (CULTURELLE) 10 billion cell capsule, Take 1 capsule by mouth once daily., Disp: , Rfl:     magnesium oxide (MAG-OX) 400 mg tablet, Take  400 mg by mouth once daily., Disp: , Rfl:     montelukast (SINGULAIR) 10 mg tablet, Take 1 tablet (10 mg total) by mouth every evening., Disp: 90 tablet, Rfl: 1    omeprazole (PRILOSEC) 40 MG capsule, TAKE 1 CAPSULE TWICE DAILY BEFORE MEALS, Disp: 180 capsule, Rfl: 2    pravastatin (PRAVACHOL) 40 MG tablet, Take 1 tablet (40 mg total) by mouth once daily., Disp: 90 tablet, Rfl: 3    salsalate (DISALCID) 500 MG tablet, 500 mg daily as needed. Twice a day, Disp: , Rfl:     tramadol (ULTRAM) 50 mg tablet, Take  mg by mouth every 6 to 8 hours as needed. , Disp: , Rfl:     UBIDECARENONE (COQ-10 ORAL), Take 1 capsule by mouth once daily. , Disp: , Rfl:     PMHx/PSHx Updates:        Objective:     Vitals:  Blood pressure 136/72, weight 94.9 kg (209 lb 4.8 oz).    Physical Examination:   GEN: no apparent distress, comfortable; AAOx3  SKIN: no rashes,no ulceration, no Raynaud's, no petechiae, no SQ nodules,  HEAD: normal  EYES: no pallor, no icterus  NECK: no masses, thyroid normal, trachea midline, no LAD/LN's, supple  CV: RRR with no murmur; l S1 and S2 reg. ,no gallop no rubs,   CHEST: Normal respiratory effort; CTAB; normal breath sounds; no wheeze or crackles  MUSC/Skeletal: ROM normal; no crepitus; joints without synovitis,  + Heberden's deformities  No joint swelling or tenderness of PIP, MCP, wrist, elbow, shoulder, or knee joints  EXTREM: no clubbing, cyanosis, no edema,normal  pulses   NEURO: grossly intact; motor WNL; AAOx3;  PSYCH: normal mood, affect and behavior  LYMPH: normal cervical, supraclavicular          Labs:   Lab Results   Component Value Date    WBC 6.44 08/23/2018    HGB 13.7 08/23/2018    HCT 43.6 08/23/2018    MCV 95 08/23/2018     08/23/2018    CMP  @[facility](NA,K,CL,CO2,GLU,BUN,Creatinine,Calcium,PROT,Albumin,Bilitot,Alkphos,AST,ALT,CRP,ESR,RF,CCP,VANNA,SSA,CPK,uric acid) )@  I have reviewed all available lab results and radiology reports.    Radiology/Diagnostic Studies:    TB  Gold done  February 2018: negative    Assessment/Plan:   (1) 73 y.o. female with diagnosis of Rheumatoid arthritis and Osteoarthritis. She is doing well.      CBC CMP CRP          Discussion:     I have explained all of the above in detail and the patient understands all of the current recommendation(s). I have answered all questions to the best of my ability and to their complete satisfaction.       The patient is to continue with the current management plan         RTC in   4 months      Electronically signed by Manjinder Schultz MD

## 2018-12-10 ENCOUNTER — HOSPITAL ENCOUNTER (OUTPATIENT)
Dept: RADIOLOGY | Facility: CLINIC | Age: 73
Discharge: HOME | End: 2018-12-10
Attending: FAMILY MEDICINE
Payer: MEDICARE

## 2018-12-10 DIAGNOSIS — Z78.0 MENOPAUSE: ICD-10-CM

## 2018-12-10 PROCEDURE — 77080 DXA BONE DENSITY AXIAL: CPT | Mod: 26,,, | Performed by: RADIOLOGY

## 2018-12-10 PROCEDURE — 77080 DXA BONE DENSITY AXIAL: CPT | Mod: TC,PO

## 2019-01-07 ENCOUNTER — OFFICE VISIT (OUTPATIENT)
Dept: PULMONOLOGY | Facility: CLINIC | Age: 74
End: 2019-01-07
Payer: MEDICARE

## 2019-01-07 ENCOUNTER — DOCUMENTATION ONLY (OUTPATIENT)
Dept: PULMONOLOGY | Facility: CLINIC | Age: 74
End: 2019-01-07

## 2019-01-07 VITALS
HEART RATE: 57 BPM | HEIGHT: 60 IN | BODY MASS INDEX: 41.43 KG/M2 | DIASTOLIC BLOOD PRESSURE: 72 MMHG | SYSTOLIC BLOOD PRESSURE: 130 MMHG | OXYGEN SATURATION: 95 % | WEIGHT: 211 LBS

## 2019-01-07 DIAGNOSIS — J30.9 CHRONIC ALLERGIC RHINITIS: ICD-10-CM

## 2019-01-07 DIAGNOSIS — J45.40 MODERATE PERSISTENT ASTHMA WITHOUT COMPLICATION: ICD-10-CM

## 2019-01-07 DIAGNOSIS — G47.33 OSA (OBSTRUCTIVE SLEEP APNEA): Primary | ICD-10-CM

## 2019-01-07 DIAGNOSIS — M05.79 RHEUMATOID ARTHRITIS INVOLVING MULTIPLE SITES WITH POSITIVE RHEUMATOID FACTOR: ICD-10-CM

## 2019-01-07 PROCEDURE — 99214 OFFICE O/P EST MOD 30 MIN: CPT | Mod: ,,, | Performed by: NURSE PRACTITIONER

## 2019-01-07 PROCEDURE — 99214 PR OFFICE/OUTPT VISIT, EST, LEVL IV, 30-39 MIN: ICD-10-PCS | Mod: ,,, | Performed by: NURSE PRACTITIONER

## 2019-01-07 NOTE — PATIENT INSTRUCTIONS
Obstructive Sleep Apnea  Obstructive sleep apnea is a condition that causes your air passages to become narrowed or blocked during sleep. As a result, breathing stops for short periods. Your body wakes up enough for breathing to begin again, though you don't remember it. The cycle of stopped breathing and brief awakenings can repeat dozens of times a night. This prevents the body from getting to the deeper stages of sleep that are needed for good rest and may cause your body's oxygen level to fall.  Signs of sleep apnea include loud snoring, noisy breathing, and gasping sounds during sleep. Daytime symptoms include waking up tired after a full night's sleep, waking up with headaches, feeling very sleepy or falling asleep during the day, and having problems with memory or concentration.  Risk factors for sleep apnea include:  · Being overweight  · Being a man, or a woman in menopause  · Smoking  · Using alcohol or sedating medicines  · Having enlarged structures in the nose or throat  Home care  Lifestyle changes that can help treat snoring and sleep apnea include the following:  · If you are overweight, lose weight. Talk to your healthcare provider about a weight-loss plan for you.  · Avoid alcohol for 3 to 4 hours before bedtime. Avoid sedating medications. Ask your healthcare provider about the medicines you take.  · If you smoke, talk to your healthcare provider about ways to quit.  · Sleep on your side. This can help prevent gravity from pulling relaxed throat tissues into your breathing passages.  · If you have allergies or sinus problems that block your nose, ask your healthcare provider for help.  Follow-up care  Follow up with your healthcare provider, or as advised. A diagnosis of sleep apnea is made with a sleep study. Your healthcare provider can tell you more about this test.  When to seek medical advice  Sleep apnea can make you more likely to have certain health problems. These include high blood  pressure, heart attack, stroke, and sexual dysfunction. If you have sleep apnea, talk to your healthcare provider about the best treatments for you.  Date Last Reviewed: 4/1/2017  © 2006-1211 [employer]. [address] [place] [address], [place] [address]. All rights reserved. This information is not intended as a substitute for professional medical care. Always follow your healthcare professional's instructions.    Call [facility] we need a download    GSK forms to see if can get assistance  Continue Breo  Call if you get sick

## 2019-01-07 NOTE — PROGRESS NOTES
Compliance report shows that patient is 97% compliant.  She sleeps on her device on average 8 hours, and her AHI is 2.1.  Her CPAP is set at 12 cm pf pressure.

## 2019-01-07 NOTE — PROGRESS NOTES
SUBJECTIVE:    Patient ID: Ling Rapp is a 73 y.o. female.    Chief Complaint: Sleep Apnea (follow up)    HPI   Patient here today feeling well.  She is using Breo daily.  She states the Breo is very expensive.  She is wearing her oxygen all the time.  She is sleeping on her CPAP with oxygen bled in, said she could not last week for 2 days secondary to stuffed nose.  She also states she needs a new nebulizer machine and supplies     Past Medical History:   Diagnosis Date    ALLERGIC RHINITIS     Anticoagulant long-term use     Asthma     Cataract     Former smoker     GERD (gastroesophageal reflux disease)     Hemorrhoids     Hypertension     DANE (obstructive sleep apnea)     Osteopenia     Pancreatitis     secondary to severe hypertriglyceridemia    Peptic ulcer     Rheumatoid arthritis     Vitamin D deficiency      Past Surgical History:   Procedure Laterality Date    Athroscopy(knee)      BREAST BIOPSY Left 2017    benign    CARDIAC CATHETERIZATION      CATARACT EXTRACTION Left 2013    COLONOSCOPY N/A 3/30/2016    [facility] at [facility]    EGD (ESOPHAGOGASTRODUODENOSCOPY) N/A 5/21/2013    [facility] at [facility]    EYE SURGERY      cataracts bilateral     FRACTURE SURGERY  1997    right knee     HYSTERECTOMY  1982    PARTIAL HYSTERECTOMY      w/ USO    TONSILLECTOMY      UPPER GASTROINTESTINAL ENDOSCOPY  5/2013     Family History   Problem Relation Age of Onset    Colon polyps Sister 64    Ulcers Father     Emphysema Father         smoker    Alcohol abuse Father     Thyroid disease Mother     Heart disease Mother     Emphysema Mother         smoker    Alzheimer's disease Brother     Stroke Brother     Cancer Brother     Throat cancer Brother         non smoker    No Known Problems Son     Diabetes Maternal Uncle         1/2 brother    Cancer Paternal Aunt     Early death Paternal Aunt     Leukemia Paternal  Aunt         highschool    Breast cancer Paternal Aunt     Heart disease Paternal Uncle     Rheum arthritis Maternal Grandmother     Pneumonia Maternal Grandfather     Cancer Paternal Grandmother     Breast cancer Paternal Grandmother     Early death Paternal Grandfather          killed in line of duty        Social History:   Marital Status:   Occupation:retired    Alcohol History:  reports that she does not drink alcohol.  Tobacco History:  reports that she quit smoking about 47 years ago. Her smoking use included cigarettes. She has a 10.00 pack-year smoking history. she has never used smokeless tobacco.  Drug History:  reports that she does not use drugs.    Review of patient's allergies indicates:   Allergen Reactions    Amoxicillin-pot clavulanate Diarrhea and Other (See Comments)     Sever cramping    Diltiazem Other (See Comments)     Extreme dry moth    Fenofibrate Other (See Comments)     Other reaction(s): Itching    Hydroxychloroquine      Other reaction(s): eye accomodation problems    Leflunomide      Other reaction(s): abd pains    Lisinopril-hydrochlorothiazide      Other reaction(s): diarrhea  Other reaction(s): cramps    Methotrexate      Other reaction(s): pancreatitis    Norvasc [amlodipine] Swelling    Sulfa (sulfonamide antibiotics)      Other reaction(s): Swelling  Other reaction(s): Hives    Sulfamethoxazole-trimethoprim        Current Outpatient Medications   Medication Sig Dispense Refill    albuterol (ACCUNEB) 1.25 mg/3 mL Nebu Take 3 mLs (1.25 mg total) by nebulization every 6 (six) hours as needed (shortness of breath, or wheezing). Rescue 1 Box 3    albuterol (PROAIR HFA) 90 mcg/actuation inhaler Inhale 2 puffs into the lungs every 6 (six) hours as needed for Wheezing. 3 Inhaler 5    artificial saliva, yerbas-lyt, SprA Use as directed 240 mL 11    aspirin 81 mg Tab Take 81 mg by mouth once daily. Every day      atenolol (TENORMIN) 100 MG tablet  TAKE 1 TABLET DAILY 90 tablet 2    azelastine (ASTELIN) 137 mcg (0.1 %) nasal spray 1 spray by Nasal route 2 (two) times daily.      beclomethasone (QVAR) 80 mcg/actuation Aero Inhale 1 puff into the lungs 2 (two) times daily. Controller      CALCIUM CARBONATE/VITAMIN D3 (OS-ADDY 500 + D) 500-125 mg-unit Tab Take 1 tablet by mouth once daily. Twice a day      cetirizine (ZYRTEC) 10 MG tablet Take 10 mg by mouth once daily. Every day      cholecalciferol, vitamin D3, 5,000 unit capsule Take 5,000 Units by mouth once daily. 5 a week      cyanocobalamin (VITAMIN B-12) 100 MCG tablet Take 100 mcg by mouth once daily.      diclofenac sodium (VOLTAREN) 1 % Gel Apply 2 g topically once daily.      etanercept (ENBREL) 50 mg/mL (0.98 mL) injection 50 mg once a week. once weekly      fluticasone (FLONASE) 50 mcg/actuation nasal spray 2 sprays by Each Nare route once daily. Every day 48 g 3    Lactobacillus rhamnosus GG (CULTURELLE) 10 billion cell capsule Take 1 capsule by mouth once daily.      magnesium oxide (MAG-OX) 400 mg tablet Take 400 mg by mouth once daily.      montelukast (SINGULAIR) 10 mg tablet Take 1 tablet (10 mg total) by mouth every evening. 90 tablet 1    omeprazole (PRILOSEC) 40 MG capsule TAKE 1 CAPSULE TWICE DAILY BEFORE MEALS 180 capsule 2    salsalate (DISALCID) 500 MG tablet 500 mg daily as needed. Twice a day      tramadol (ULTRAM) 50 mg tablet Take  mg by mouth every 6 to 8 hours as needed.       UBIDECARENONE (COQ-10 ORAL) Take 1 capsule by mouth once daily.       benzonatate (TESSALON) 200 MG capsule Take 200 mg by mouth 3 (three) times daily as needed for Cough.       No current facility-administered medications for this visit.        Last PFT: 09/17/2018  Last CT: 09/06/2018    Review of Systems  General: Feeling Well.  Eyes: Vision is good.  ENT:  Congestion better   Heart:: No chest pain or palpitations.  Lungs: breathing is better with breo, however Breo very  expensive  GI: reflux controlled with Prilosec  : No dysuria, hesitancy, or nocturia.  Musculoskeletal: No joint pain or myalgias.  Skin: No lesions or rashes.  Neuro: No headaches or neuropathy.  Lymph: No edema or adenopathy.  Psych: No anxiety or depression.  Endo: weight gain     OBJECTIVE:      /72   Pulse (!) 57   Ht 5' (1.524 m)   Wt 95.7 kg (211 lb)   SpO2 95%   BMI 41.21 kg/m²     Physical Exam  GENERAL: Older patient in no distress.  HEENT: Pupils equal and reactive. Extraocular movements intact. Nose intact.      Pharynx moist.  NECK: Supple.   HEART: Regular rate and rhythm. No murmur or gallop auscultated.  LUNGS: Clear to auscultation and percussion. Lung excursion symmetrical. No change in fremitus. No adventitial noises.  ABDOMEN: Bowel sounds present. Non-tender, no masses palpated.  EXTREMITIES: Normal muscle tone and joint movement, clubbed nails  LYMPHATICS: No adenopathy palpated, no edema.  SKIN: Dry, intact, no lesions.   NEURO: Cranial nerves II-XII intact. Motor strength 5/5 bilaterally, upper and lower extremities.  PSYCH: Appropriate affect.    Assessment:       1. DANE (obstructive sleep apnea)    2. Moderate persistent asthma without complication    3. Chronic allergic rhinitis    4. Rheumatoid arthritis involving multiple sites with positive rheumatoid factor          Plan:       Call [facility] we need a download    [employer] forms to see if can get assistance  Continue Breo  Call if you get sick    Order nebulizer for patient   Follow-up in about 6 months (around 7/7/2019).

## 2019-01-09 ENCOUNTER — TELEPHONE (OUTPATIENT)
Dept: PULMONOLOGY | Facility: CLINIC | Age: 74
End: 2019-01-09

## 2019-01-09 NOTE — TELEPHONE ENCOUNTER
----- Message from Spencer Rey MA sent at 1/9/2019  8:57 AM CST -----  No we had ordered one in October but Chad called asking us to document that bc for some reason Medicare has not been paying for it.   ----- Message -----  From: EVERTON Michelle  Sent: 1/8/2019   4:20 PM  To: Spencer Rey MA    She did not request a nebulizer at her visit, did she call wanting one?  ----- Message -----  From: Spencer Rey MA  Sent: 1/8/2019  10:26 AM  To: EVERTON Michelle    Can you please add documentation on visit that we will order a nebulizer for home use

## 2019-01-16 ENCOUNTER — TELEPHONE (OUTPATIENT)
Dept: FAMILY MEDICINE | Facility: CLINIC | Age: 74
End: 2019-01-16

## 2019-01-16 NOTE — TELEPHONE ENCOUNTER
----- Message from Kassidy Atkinson sent at 1/16/2019  8:55 AM CST -----  Contact: self  Patient [phone] or [phone] is calling to schedule physical appts for both she and her  for after 03 08 19/wants to see Dr Dawson and is wanting to come together/please advise

## 2019-03-19 DIAGNOSIS — J45.909 ASTHMA, UNSPECIFIED ASTHMA SEVERITY, UNSPECIFIED WHETHER COMPLICATED, UNSPECIFIED WHETHER PERSISTENT: Primary | ICD-10-CM

## 2019-03-19 RX ORDER — MONTELUKAST SODIUM 10 MG/1
10 TABLET ORAL NIGHTLY
Qty: 30 TABLET | Refills: 3 | Status: SHIPPED | OUTPATIENT
Start: 2019-03-19 | End: 2019-06-03 | Stop reason: SDUPTHER

## 2019-04-09 ENCOUNTER — HOSPITAL ENCOUNTER (EMERGENCY)
Facility: HOSPITAL | Age: 74
Discharge: HOME | End: 2019-04-09
Attending: EMERGENCY MEDICINE
Payer: MEDICARE

## 2019-04-09 ENCOUNTER — NURSE TRIAGE (OUTPATIENT)
Dept: ADMINISTRATIVE | Facility: CLINIC | Age: 74
End: 2019-04-09

## 2019-04-09 ENCOUNTER — TELEPHONE (OUTPATIENT)
Dept: FAMILY MEDICINE | Facility: CLINIC | Age: 74
End: 2019-04-09

## 2019-04-09 ENCOUNTER — OFFICE VISIT (OUTPATIENT)
Dept: FAMILY MEDICINE | Facility: CLINIC | Age: 74
End: 2019-04-09
Payer: MEDICARE

## 2019-04-09 VITALS
HEIGHT: 60 IN | OXYGEN SATURATION: 97 % | TEMPERATURE: 98 F | WEIGHT: 211 LBS | DIASTOLIC BLOOD PRESSURE: 80 MMHG | SYSTOLIC BLOOD PRESSURE: 146 MMHG | BODY MASS INDEX: 41.43 KG/M2

## 2019-04-09 VITALS
HEIGHT: 60 IN | BODY MASS INDEX: 40.25 KG/M2 | DIASTOLIC BLOOD PRESSURE: 80 MMHG | WEIGHT: 205 LBS | TEMPERATURE: 99 F | HEART RATE: 60 BPM | OXYGEN SATURATION: 95 % | RESPIRATION RATE: 16 BRPM | SYSTOLIC BLOOD PRESSURE: 170 MMHG

## 2019-04-09 DIAGNOSIS — I10 ESSENTIAL HYPERTENSION: ICD-10-CM

## 2019-04-09 DIAGNOSIS — M54.50 LUMBAR PAIN WITH RADIATION DOWN RIGHT LEG: Primary | ICD-10-CM

## 2019-04-09 DIAGNOSIS — M54.41 ACUTE RIGHT-SIDED LOW BACK PAIN WITH RIGHT-SIDED SCIATICA: Primary | ICD-10-CM

## 2019-04-09 DIAGNOSIS — M79.604 LUMBAR PAIN WITH RADIATION DOWN RIGHT LEG: Primary | ICD-10-CM

## 2019-04-09 PROCEDURE — 96372 THER/PROPH/DIAG INJ SC/IM: CPT

## 2019-04-09 PROCEDURE — 63600175 PHARM REV CODE 636 W HCPCS: Performed by: NURSE PRACTITIONER

## 2019-04-09 PROCEDURE — 99999 PR PBB SHADOW E&M-EST. PATIENT-LVL III: ICD-10-PCS | Mod: PBBFAC,,, | Performed by: NURSE PRACTITIONER

## 2019-04-09 PROCEDURE — 99284 EMERGENCY DEPT VISIT MOD MDM: CPT | Mod: 25,27

## 2019-04-09 PROCEDURE — 99999 PR PBB SHADOW E&M-EST. PATIENT-LVL III: CPT | Mod: PBBFAC,,, | Performed by: NURSE PRACTITIONER

## 2019-04-09 PROCEDURE — 99213 PR OFFICE/OUTPT VISIT, EST, LEVL III, 20-29 MIN: ICD-10-PCS | Mod: S$PBB,,, | Performed by: NURSE PRACTITIONER

## 2019-04-09 PROCEDURE — 99213 OFFICE O/P EST LOW 20 MIN: CPT | Mod: S$PBB,,, | Performed by: NURSE PRACTITIONER

## 2019-04-09 PROCEDURE — 99213 OFFICE O/P EST LOW 20 MIN: CPT | Mod: PBBFAC,PO | Performed by: NURSE PRACTITIONER

## 2019-04-09 PROCEDURE — 96372 THER/PROPH/DIAG INJ SC/IM: CPT | Mod: PBBFAC,PO

## 2019-04-09 RX ORDER — HYDROMORPHONE HYDROCHLORIDE 2 MG/ML
1 INJECTION, SOLUTION INTRAMUSCULAR; INTRAVENOUS; SUBCUTANEOUS
Status: COMPLETED | OUTPATIENT
Start: 2019-04-09 | End: 2019-04-09

## 2019-04-09 RX ORDER — GABAPENTIN 300 MG/1
300 CAPSULE ORAL NIGHTLY
Qty: 30 CAPSULE | Refills: 1 | Status: SHIPPED | OUTPATIENT
Start: 2019-04-09 | End: 2019-09-03

## 2019-04-09 RX ORDER — METHYLPREDNISOLONE 4 MG/1
TABLET ORAL
Qty: 1 PACKAGE | Refills: 0 | Status: SHIPPED | OUTPATIENT
Start: 2019-04-09 | End: 2019-04-30

## 2019-04-09 RX ORDER — KETOROLAC TROMETHAMINE 30 MG/ML
30 INJECTION, SOLUTION INTRAMUSCULAR; INTRAVENOUS ONCE
Status: COMPLETED | OUTPATIENT
Start: 2019-04-09 | End: 2019-04-09

## 2019-04-09 RX ADMIN — HYDROMORPHONE HYDROCHLORIDE 1 MG: 2 INJECTION, SOLUTION INTRAMUSCULAR; INTRAVENOUS; SUBCUTANEOUS at 10:04

## 2019-04-09 RX ADMIN — KETOROLAC TROMETHAMINE 30 MG: 30 INJECTION, SOLUTION INTRAMUSCULAR; INTRAVENOUS at 01:04

## 2019-04-09 NOTE — PROGRESS NOTES
Patient verified by name and . Patient received 30mg ketorolac in right ventrogluteal. Patient tolerated injection well. Patient advised to wait in clinic for 15 minutes in case of adverse reactions. Patient demonstrated understanding.

## 2019-04-09 NOTE — TELEPHONE ENCOUNTER
Patient says she has RA and her knees have been hurting so she has done more bending at the waist than usual and may have strained back- pain radiates down right leg- has old Norco at home that she took but is not helping. Appt made today.

## 2019-04-09 NOTE — TELEPHONE ENCOUNTER
Reason for Disposition   SEVERE back pain of sudden onset and age > 60    Protocols used: BACK PAIN-A-[place]    Ling has low back pain, started suddenly with no warning two days ago, pain rated 10/10, and radiates down her right leg.  Hard to lift the right leg.  Age 73, recommended ED now for evaluation of back pain of sudden onset, per [facility] triage protocol.  Message to Arturo Dawson MD, pcp, and Dr Manjinder Schultz, rheumatology with [facility]. She will call her  to come home and get her, for trip to [place] ED.  Lives in [place] and does not wish to go to [facility].  Please contact caller directly with any additional care advice.  CALL BACK IF:  *  Numbness or weakness occur  *  Bowel/bladder problems occur  *  Pain lasts for more than 2 weeks  *  You become worse

## 2019-04-09 NOTE — PROGRESS NOTES
Subjective:       Patient ID: Ling Rapp is a 73 y.o. female.    Chief Complaint: Back Pain    HPI   Ms. Rapp is a 72 yo female who presents today with c/o back pain.  The pain began about 3-4 days ago.  The pain is in her lower back and radiates down her right leg.  She rates the pain 10/10.  She denies trauma.  The only thing different she has done is bending from the waist due to her knees bothering her.  She is in a wheelchair today, but she can walk if she has to.  She took norco this morning and has taken ultram, neither have helped the pain.    Review of Systems   Constitutional: Negative for chills, fatigue, fever and unexpected weight change.   HENT: Negative for congestion, hearing loss, nosebleeds, postnasal drip, sinus pressure, sinus pain and sore throat.    Eyes: Negative for pain, discharge, redness and visual disturbance.   Respiratory: Negative for cough, chest tightness and shortness of breath.    Cardiovascular: Negative for chest pain and palpitations.   Gastrointestinal: Negative for abdominal pain, constipation, diarrhea, nausea and vomiting.   Endocrine: Negative for cold intolerance and heat intolerance.   Musculoskeletal: Positive for back pain (right sided lower back pain).   Neurological: Negative for dizziness, syncope, light-headedness and headaches.   Psychiatric/Behavioral: Negative for agitation and dysphoric mood. The patient is not nervous/anxious.        Objective:      Physical Exam   Constitutional: She is oriented to person, place, and time. She appears well-developed and well-nourished.   HENT:   Head: Normocephalic and atraumatic.   Eyes: Pupils are equal, round, and reactive to light. Conjunctivae are normal. Right eye exhibits no discharge. Left eye exhibits no discharge.   Neck: Normal range of motion. Neck supple. No thyromegaly present.   Cardiovascular: Normal rate, regular rhythm, normal heart sounds and intact distal pulses.   Pulmonary/Chest: Breath  sounds normal. No respiratory distress. She has no wheezes.   Musculoskeletal: Normal range of motion. She exhibits no edema or deformity.        Lumbar back: She exhibits tenderness and spasm.        Back:    Lymphadenopathy:     She has no cervical adenopathy.   Neurological: She is alert and oriented to person, place, and time. No cranial nerve deficit or sensory deficit.   Skin: Skin is warm and dry. No rash noted.   Psychiatric: She has a normal mood and affect.       Assessment:       1. Acute right-sided low back pain with right-sided sciatica    2. Essential hypertension        Plan:       Acute right-sided low back pain with right-sided sciatica  -     ketorolac injection 30 mg  -     methylPREDNISolone (MEDROL DOSEPACK) 4 mg tablet; use as directed  Dispense: 1 Package; Refill: 0  -     gabapentin (NEURONTIN) 300 MG capsule; Take 1 capsule (300 mg total) by mouth every evening.  Dispense: 30 capsule; Refill: 1  -     [facility]        -     Notify provider if no relief obtained with above  Essential hypertension  - Slightly elevated today, likely due to pain

## 2019-04-09 NOTE — TELEPHONE ENCOUNTER
----- Message from Kimberly Simmons sent at 4/9/2019 11:13 AM CDT -----   Type: Needs Medical Advice    Who Called:  pt  Symptoms (please be specific):    radianting  Pain  Lower back   Down leg  No  Other  symptoms   How long has patient had these symptoms:  Several  days  Best Call Back Number: [phone]  Additional Information  Pt  Needs  Advice

## 2019-04-10 ENCOUNTER — INITIAL CONSULT (OUTPATIENT)
Dept: SPINE | Facility: CLINIC | Age: 74
End: 2019-04-10
Payer: MEDICARE

## 2019-04-10 ENCOUNTER — HOSPITAL ENCOUNTER (OUTPATIENT)
Dept: RADIOLOGY | Facility: HOSPITAL | Age: 74
Discharge: HOME | End: 2019-04-10
Attending: PHYSICAL MEDICINE & REHABILITATION
Payer: MEDICARE

## 2019-04-10 VITALS
HEART RATE: 58 BPM | WEIGHT: 205 LBS | SYSTOLIC BLOOD PRESSURE: 203 MMHG | BODY MASS INDEX: 40.25 KG/M2 | HEIGHT: 60 IN | DIASTOLIC BLOOD PRESSURE: 74 MMHG

## 2019-04-10 DIAGNOSIS — M54.16 LUMBAR RADICULITIS: ICD-10-CM

## 2019-04-10 DIAGNOSIS — M54.16 LUMBAR RADICULITIS: Primary | ICD-10-CM

## 2019-04-10 PROCEDURE — 72148 MRI LUMBAR SPINE WITHOUT CONTRAST: ICD-10-PCS | Mod: 26,,, | Performed by: RADIOLOGY

## 2019-04-10 PROCEDURE — 72148 MRI LUMBAR SPINE W/O DYE: CPT | Mod: 26,,, | Performed by: RADIOLOGY

## 2019-04-10 PROCEDURE — 99204 PR OFFICE/OUTPT VISIT, NEW, LEVL IV, 45-59 MIN: ICD-10-PCS | Mod: ,,, | Performed by: PHYSICAL MEDICINE & REHABILITATION

## 2019-04-10 PROCEDURE — 99204 OFFICE O/P NEW MOD 45 MIN: CPT | Mod: ,,, | Performed by: PHYSICAL MEDICINE & REHABILITATION

## 2019-04-10 PROCEDURE — 72148 MRI LUMBAR SPINE W/O DYE: CPT | Mod: TC

## 2019-04-10 RX ORDER — OXYCODONE AND ACETAMINOPHEN 10; 325 MG/1; MG/1
1 TABLET ORAL
Qty: 30 TABLET | Refills: 0 | Status: SHIPPED | OUTPATIENT
Start: 2019-04-10 | End: 2019-09-03

## 2019-04-10 NOTE — PROGRESS NOTES
SUBJECTIVE:    Patient ID: Ling Rapp is a 73 y.o. female.    Chief Complaint: Back Pain (right sided x 1 week)    This is a 73-year-old woman who sees Dr. Dawson for her primary care. She has history of rheumatoid arthritis, hypertension and obstructive sleep apnea.  She presented to her primary care provider's office yesterday with complaints of a 3-4 day history of right-sided low back pain and right leg pain.  She was given a shot of Toradol and prescribed a Medrol Dosepak which she has not started yet.  Also a prescription for Neurontin.  Later that night she presented to the emergency department and was prescribed Dilaudid IM which helped her get some sleep.  She presents to me now with severe right leg discomfort radiating into the anterior portion of the leg below the knee and into the dorsum of the foot toward the great toe.  She denies any claudia weakness but has not been able to ambulate secondary to pain.  She denies any bowel or bladder dysfunction fever chills sweats or unexpected weight loss.  She has been using some tramadol and Norco she had at home in that really has not helped her pain        Past Medical History:   Diagnosis Date    ALLERGIC RHINITIS     Anticoagulant long-term use     Asthma     Cataract     Former smoker     GERD (gastroesophageal reflux disease)     Hemorrhoids     Hypertension     DANE (obstructive sleep apnea)     Osteopenia     Pancreatitis     secondary to severe hypertriglyceridemia    Peptic ulcer     Rheumatoid arthritis     Vitamin D deficiency      Social History     Socioeconomic History    Marital status:      Spouse name: Not on file    Number of children: Not on file    Years of education: Not on file    Highest education level: Not on file   Occupational History    Occupation: retired hairdresser    Social Needs    Financial resource strain: Not on file    Food insecurity:     Worry: Not on file     Inability: Not on file     Transportation needs:     Medical: Not on file     Non-medical: Not on file   Tobacco Use    Smoking status: Former Smoker     Packs/day: 1.00     Years: 10.00     Pack years: 10.00     Types: Cigarettes     Last attempt to quit: 1972     Years since quittin.3    Smokeless tobacco: Never Used    Tobacco comment: quit    Substance and Sexual Activity    Alcohol use: No    Drug use: No    Sexual activity: Yes   Lifestyle    Physical activity:     Days per week: Not on file     Minutes per session: Not on file    Stress: Not on file   Relationships    Social connections:     Talks on phone: Not on file     Gets together: Not on file     Attends Church service: Not on file     Active member of club or organization: Not on file     Attends meetings of clubs or organizations: Not on file     Relationship status: Not on file   Other Topics Concern    Not on file   Social History Narrative    Not on file     Past Surgical History:   Procedure Laterality Date    Athroscopy(knee)      BREAST BIOPSY Left     benign    CARDIAC CATHETERIZATION      CATARACT EXTRACTION Left     COLONOSCOPY N/A 3/30/2016    [facility]    EGD (ESOPHAGOGASTRODUODENOSCOPY) N/A 2013    [facility]    EYE SURGERY      cataracts bilateral     FRACTURE SURGERY      right knee     HYSTERECTOMY      PARTIAL HYSTERECTOMY      w/ USO    TONSILLECTOMY      UPPER GASTROINTESTINAL ENDOSCOPY  2013     Family History   Problem Relation Age of Onset    Colon polyps Sister 64    Ulcers Father     Emphysema Father         smoker    Alcohol abuse Father     Thyroid disease Mother     Heart disease Mother     Emphysema Mother         smoker    Alzheimer's disease Brother     Stroke Brother     Cancer Brother     Throat cancer Brother         non smoker    No Known Problems Son     Diabetes Maternal Uncle         /2  brother    Cancer Paternal Aunt     Early death Paternal Aunt     Leukemia Paternal Aunt         highschool    Breast cancer Paternal Aunt     Heart disease Paternal Uncle     Rheum arthritis Maternal Grandmother     Pneumonia Maternal Grandfather     Cancer Paternal Grandmother     Breast cancer Paternal Grandmother     Early death Paternal Grandfather          killed in line of duty     Vitals:    04/10/19 1038   BP: (!) 203/74   Pulse: (!) 58   Weight: 93 kg (205 lb 0.4 oz)   Height: 5' (1.524 m)       Review of Systems   Constitutional: Negative for chills, diaphoresis, fatigue, fever and unexpected weight change.   HENT: Negative for trouble swallowing.    Eyes: Negative for visual disturbance.   Respiratory: Negative for shortness of breath.    Cardiovascular: Negative for chest pain.   Gastrointestinal: Negative for abdominal pain, constipation, nausea and vomiting.   Genitourinary: Negative for difficulty urinating.   Musculoskeletal: Negative for arthralgias, back pain, gait problem, joint swelling, myalgias, neck pain and neck stiffness.   Neurological: Negative for dizziness, speech difficulty, weakness, light-headedness, numbness and headaches.          Objective:      Physical Exam   Constitutional: She is oriented to person, place, and time. She appears well-developed and well-nourished.   Neurological: She is alert and oriented to person, place, and time.   She is awake and in no acute distress but too uncomfortable to transfer to the exam table.  She is examined in the wheelchair  Deep tendon reflexes are trace at both knees and both ankles  Strength appears to be intact in both lower extremities.  She has at least antigravity strength in her ankle dorsiflexors and EHL    Review of her x-rays of the lumbar spine from 2012 shows advanced degenerative disc disease at L5-S1           Assessment:       1. Lumbar radiculitis           Plan:     she has a nonfocal examination from a neurological  standpoint and no historical red flags.  She has symptoms consistent with acute right L5 radiculitis.  She is in severe pain.  She has not responded to treatment thus far.  We will get an MRI of the lumbar spine in preparation for likely epidural steroid injections.  Percocet 10 for pain.  Follow-up with me after the MRI      Lumbar radiculitis  -     MRI Lumbar Spine Without Contrast; Future; Expected date: 04/10/2019    Other orders  -     oxyCODONE-acetaminophen (PERCOCET)  mg per tablet; Take 1 tablet by mouth every 4 to 6 hours as needed for Pain.  Dispense: 30 tablet; Refill: 0

## 2019-04-10 NOTE — LETTER
April 10, 2019      Cady Lay, NP  [address]  [place] [address]           [place] [facility]  [address] [place] [address] [address]  [place] [address]  Phone: [phone]  Fax: [phone]          Patient: Ling Rapp   MR Number: [id number]   YOB: 1945   Date of Visit: 4/10/2019       Dear Cady Lay:    Thank you for referring Ling Rapp to me for evaluation. Attached you will find relevant portions of my assessment and plan of care.    If you have questions, please do not hesitate to call me. I look forward to following Ling Rapp along with you.    Sincerely,    Thee Johnson MD    Enclosure  CC:  No Recipients    If you would like to receive this communication electronically, please contact [email or web address] or [phone] to request more information on [employer] access.    For providers and/or their staff who would like to refer a patient to [facility], please contact us through our one-stop-shop provider referral line, [facility] [place], at [phone].    If you feel you have received this communication in error or would no longer like to receive these types of communications, please e-mail [email or web address]

## 2019-04-11 ENCOUNTER — OFFICE VISIT (OUTPATIENT)
Dept: SPINE | Facility: CLINIC | Age: 74
End: 2019-04-11
Payer: MEDICARE

## 2019-04-11 ENCOUNTER — TELEPHONE (OUTPATIENT)
Dept: PAIN MEDICINE | Facility: CLINIC | Age: 74
End: 2019-04-11

## 2019-04-11 VITALS — WEIGHT: 205 LBS | HEIGHT: 60 IN | BODY MASS INDEX: 40.25 KG/M2

## 2019-04-11 DIAGNOSIS — M54.16 LUMBAR RADICULITIS: Primary | ICD-10-CM

## 2019-04-11 DIAGNOSIS — M54.16 LUMBAR RADICULOPATHY: Primary | ICD-10-CM

## 2019-04-11 PROCEDURE — 99213 OFFICE O/P EST LOW 20 MIN: CPT | Mod: ,,, | Performed by: PHYSICAL MEDICINE & REHABILITATION

## 2019-04-11 PROCEDURE — 99213 PR OFFICE/OUTPT VISIT, EST, LEVL III, 20-29 MIN: ICD-10-PCS | Mod: ,,, | Performed by: PHYSICAL MEDICINE & REHABILITATION

## 2019-04-11 NOTE — H&P (VIEW-ONLY)
SUBJECTIVE:    Patient ID: Ling Rapp is a 73 y.o. female.    Chief Complaint: Results (MRI )    She is here to review her lumbar MRI which was done to evaluate her complaints of right-sided low back pain and severe right leg pain.  The MRI is summarized below:      1. Minimal levoscoliosis.  2. Chronic grade 1 anterolisthesis of L4 on L5.  3. Degenerative disc disease at L2-L3 resulting in mild central spinal canal stenosis with moderate narrowing of the right neural foramen and mild narrowing the left neural foramen.  4. Multilevel degenerative disc disease from L3-L5 resulting in moderate central spinal canal stenosis with moderate to severe neural foraminal narrowing.  5. Degenerative disc disease at L5-S1 resulting in mild central spinal canal stenosis with severe narrowing of the right neural foramen and moderate narrowing the left neural foramen.    Clinically she is about the same.  Pain medication is a minimally effective at controlling her pain        Past Medical History:   Diagnosis Date    ALLERGIC RHINITIS     Anticoagulant long-term use     Asthma     Cataract     Former smoker     GERD (gastroesophageal reflux disease)     Hemorrhoids     Hypertension     DANE (obstructive sleep apnea)     Osteopenia     Pancreatitis     secondary to severe hypertriglyceridemia    Peptic ulcer     Rheumatoid arthritis     Vitamin D deficiency      Social History     Socioeconomic History    Marital status:      Spouse name: Not on file    Number of children: Not on file    Years of education: Not on file    Highest education level: Not on file   Occupational History    Occupation: retired hairdresser    Social Needs    Financial resource strain: Not on file    Food insecurity:     Worry: Not on file     Inability: Not on file    Transportation needs:     Medical: Not on file     Non-medical: Not on file   Tobacco Use    Smoking status: Former Smoker     Packs/day: 1.00      Years: 10.00     Pack years: 10.00     Types: Cigarettes     Last attempt to quit: 1972     Years since quittin.3    Smokeless tobacco: Never Used    Tobacco comment: quit    Substance and Sexual Activity    Alcohol use: No    Drug use: No    Sexual activity: Yes   Lifestyle    Physical activity:     Days per week: Not on file     Minutes per session: Not on file    Stress: Not on file   Relationships    Social connections:     Talks on phone: Not on file     Gets together: Not on file     Attends Ep[employer] service: Not on file     Active member of club or organization: Not on file     Attends meetings of clubs or organizations: Not on file     Relationship status: Not on file   Other Topics Concern    Not on file   Social History Narrative    Not on file     Past Surgical History:   Procedure Laterality Date    Athroscopy(knee)      BREAST BIOPSY Left     benign    CARDIAC CATHETERIZATION      CATARACT EXTRACTION Left     COLONOSCOPY N/A 3/30/2016    [facility]    EGD (ESOPHAGOGASTRODUODENOSCOPY) N/A 2013    [facility]    EYE SURGERY      cataracts bilateral     FRACTURE SURGERY      right knee     HYSTERECTOMY  1982    PARTIAL HYSTERECTOMY      w/ USO    TONSILLECTOMY      UPPER GASTROINTESTINAL ENDOSCOPY  2013     Family History   Problem Relation Age of Onset    Colon polyps Sister 64    Ulcers Father     Emphysema Father         smoker    Alcohol abuse Father     Thyroid disease Mother     Heart disease Mother     Emphysema Mother         smoker    Alzheimer's disease Brother     Stroke Brother     Cancer Brother     Throat cancer Brother         non smoker    No Known Problems Son     Diabetes Maternal Uncle         1/2 brother    Cancer Paternal Aunt     Early death Paternal Aunt     Leukemia Paternal Aunt         highschool    Breast cancer Paternal Aunt      Heart disease Paternal Uncle     Rheum arthritis Maternal Grandmother     Pneumonia Maternal Grandfather     Cancer Paternal Grandmother     Breast cancer Paternal Grandmother     Early death Paternal Grandfather          killed in line of duty     Vitals:    04/11/19 1046   Weight: 93 kg (205 lb 0.4 oz)   Height: 5' (1.524 m)       Review of Systems   Constitutional: Negative for chills, diaphoresis, fatigue, fever and unexpected weight change.   HENT: Negative for trouble swallowing.    Eyes: Negative for visual disturbance.   Respiratory: Negative for shortness of breath.    Cardiovascular: Negative for chest pain.   Gastrointestinal: Negative for abdominal pain, constipation, nausea and vomiting.   Genitourinary: Negative for difficulty urinating.   Musculoskeletal: Negative for arthralgias, back pain, gait problem, joint swelling, myalgias, neck pain and neck stiffness.   Neurological: Negative for dizziness, speech difficulty, weakness, light-headedness, numbness and headaches.          Objective:      Physical Exam   Constitutional: She appears well-developed and well-nourished.   Not examined           Assessment:       1. Lumbar radiculitis           Plan:     we will refer her for transforaminal epidural steroid injection on the right at L4-5 and L5-S1.  I will see her back after that      Lumbar radiculitis

## 2019-04-11 NOTE — PROGRESS NOTES
SUBJECTIVE:    Patient ID: Ling Rapp is a 73 y.o. female.    Chief Complaint: Results (MRI )    She is here to review her lumbar MRI which was done to evaluate her complaints of right-sided low back pain and severe right leg pain.  The MRI is summarized below:      1. Minimal levoscoliosis.  2. Chronic grade 1 anterolisthesis of L4 on L5.  3. Degenerative disc disease at L2-L3 resulting in mild central spinal canal stenosis with moderate narrowing of the right neural foramen and mild narrowing the left neural foramen.  4. Multilevel degenerative disc disease from L3-L5 resulting in moderate central spinal canal stenosis with moderate to severe neural foraminal narrowing.  5. Degenerative disc disease at L5-S1 resulting in mild central spinal canal stenosis with severe narrowing of the right neural foramen and moderate narrowing the left neural foramen.    Clinically she is about the same.  Pain medication is a minimally effective at controlling her pain        Past Medical History:   Diagnosis Date    ALLERGIC RHINITIS     Anticoagulant long-term use     Asthma     Cataract     Former smoker     GERD (gastroesophageal reflux disease)     Hemorrhoids     Hypertension     DANE (obstructive sleep apnea)     Osteopenia     Pancreatitis     secondary to severe hypertriglyceridemia    Peptic ulcer     Rheumatoid arthritis     Vitamin D deficiency      Social History     Socioeconomic History    Marital status:      Spouse name: Not on file    Number of children: Not on file    Years of education: Not on file    Highest education level: Not on file   Occupational History    Occupation: retired hairdresser    Social Needs    Financial resource strain: Not on file    Food insecurity:     Worry: Not on file     Inability: Not on file    Transportation needs:     Medical: Not on file     Non-medical: Not on file   Tobacco Use    Smoking status: Former Smoker     Packs/day: 1.00      Years: 10.00     Pack years: 10.00     Types: Cigarettes     Last attempt to quit: 1972     Years since quittin.3    Smokeless tobacco: Never Used    Tobacco comment: quit    Substance and Sexual Activity    Alcohol use: No    Drug use: No    Sexual activity: Yes   Lifestyle    Physical activity:     Days per week: Not on file     Minutes per session: Not on file    Stress: Not on file   Relationships    Social connections:     Talks on phone: Not on file     Gets together: Not on file     Attends [employer] service: Not on file     Active member of club or organization: Not on file     Attends meetings of clubs or organizations: Not on file     Relationship status: Not on file   Other Topics Concern    Not on file   Social History Narrative    Not on file     Past Surgical History:   Procedure Laterality Date    Athroscopy(knee)      BREAST BIOPSY Left     benign    CARDIAC CATHETERIZATION      CATARACT EXTRACTION Left     COLONOSCOPY N/A 3/30/2016    [facility]    EGD (ESOPHAGOGASTRODUODENOSCOPY) N/A 2013    [facility]    EYE SURGERY      cataracts bilateral     FRACTURE SURGERY      right knee     HYSTERECTOMY  1982    PARTIAL HYSTERECTOMY      w/ USO    TONSILLECTOMY      UPPER GASTROINTESTINAL ENDOSCOPY  2013     Family History   Problem Relation Age of Onset    Colon polyps Sister 64    Ulcers Father     Emphysema Father         smoker    Alcohol abuse Father     Thyroid disease Mother     Heart disease Mother     Emphysema Mother         smoker    Alzheimer's disease Brother     Stroke Brother     Cancer Brother     Throat cancer Brother         non smoker    No Known Problems Son     Diabetes Maternal Uncle         1/2 brother    Cancer Paternal Aunt     Early death Paternal Aunt     Leukemia Paternal Aunt         highschool    Breast cancer Paternal Aunt      Heart disease Paternal Uncle     Rheum arthritis Maternal Grandmother     Pneumonia Maternal Grandfather     Cancer Paternal Grandmother     Breast cancer Paternal Grandmother     Early death Paternal Grandfather          killed in line of duty     Vitals:    04/11/19 1046   Weight: 93 kg (205 lb 0.4 oz)   Height: 5' (1.524 m)       Review of Systems   Constitutional: Negative for chills, diaphoresis, fatigue, fever and unexpected weight change.   HENT: Negative for trouble swallowing.    Eyes: Negative for visual disturbance.   Respiratory: Negative for shortness of breath.    Cardiovascular: Negative for chest pain.   Gastrointestinal: Negative for abdominal pain, constipation, nausea and vomiting.   Genitourinary: Negative for difficulty urinating.   Musculoskeletal: Negative for arthralgias, back pain, gait problem, joint swelling, myalgias, neck pain and neck stiffness.   Neurological: Negative for dizziness, speech difficulty, weakness, light-headedness, numbness and headaches.          Objective:      Physical Exam   Constitutional: She appears well-developed and well-nourished.   Not examined           Assessment:       1. Lumbar radiculitis           Plan:     we will refer her for transforaminal epidural steroid injection on the right at L4-5 and L5-S1.  I will see her back after that      Lumbar radiculitis

## 2019-04-11 NOTE — TELEPHONE ENCOUNTER
----- Message from Thee Johnson MD sent at 4/11/2019 11:04 AM CDT -----  Please schedule for transforaminal SHAWNA on the right at L4-5 and L5-S1

## 2019-04-12 NOTE — ED PROVIDER NOTES
----- Message from Linh Enrique MD sent at 4/12/2019  8:04 AM CDT -----  X-rays look okay. I would encourage her to use ice and just elevate the foot   Encounter Date: 4/9/2019       History     Chief Complaint   Patient presents with    Back Pain     Runs down right lower back and thru hip and down right leg. Seen at [facility] today     04/09/2019  9:54 PM     Chief Complaint:     The patient is a 73 y.o. female who presents with c/o right lower back pain that extends down entire length of right leg x 3-4 days. Denies injury/trauma. Denies fever/chils. Denies loss of bowel or bladder. Pt was seen by PCP for same today. Was given toradal IM as well as prescribed gabapentin and medrol dose pack. Pt reports she has had no improvement in pain. Also reports taking Norco with no improvement. Pt has appointment with back specialist tomorrow morning. PMHx includes HTN and RA.           Review of patient's allergies indicates:   Allergen Reactions    Amoxicillin-pot clavulanate Diarrhea and Other (See Comments)     Sever cramping    Diltiazem Other (See Comments)     Extreme dry moth    Fenofibrate Other (See Comments)     Other reaction(s): Itching    Hydroxychloroquine      Other reaction(s): eye accomodation problems    Leflunomide      Other reaction(s): abd pains    Lisinopril-hydrochlorothiazide      Other reaction(s): diarrhea  Other reaction(s): cramps    Methotrexate      Other reaction(s): pancreatitis    Norvasc [amlodipine] Swelling    Sulfa (sulfonamide antibiotics)      Other reaction(s): Swelling  Other reaction(s): Hives    Sulfamethoxazole-trimethoprim      Past Medical History:   Diagnosis Date    ALLERGIC RHINITIS     Anticoagulant long-term use     Asthma     Cataract     Former smoker     GERD (gastroesophageal reflux disease)     Hemorrhoids     Hypertension     DANE (obstructive sleep apnea)     Osteopenia     Pancreatitis     secondary to severe hypertriglyceridemia    Peptic ulcer     Rheumatoid arthritis     Vitamin D deficiency      Past Surgical History:   Procedure Laterality Date    Athroscopy(knee)      BREAST  BIOPSY Left 2017    benign    CARDIAC CATHETERIZATION      CATARACT EXTRACTION Left 2013    COLONOSCOPY N/A 3/30/2016    Per[facility]    EGD (ESOPHAGOGASTRODUODENOSCOPY) N/A 2013    Per[facility] at [facility] END[facility]    EYE SURGERY      cataracts bilateral     FRACTURE SURGERY      right knee     HYSTERECTOMY  1982    PARTIAL HYSTERECTOMY      w/ USO    TONSILLECTOMY      UPPER GASTROINTESTINAL ENDOSCOPY  2013     Family History   Problem Relation Age of Onset    Colon polyps Sister 64    Ulcers Father     Emphysema Father         smoker    Alcohol abuse Father     Thyroid disease Mother     Heart disease Mother     Emphysema Mother         smoker    Alzheimer's disease Brother     Stroke Brother     Cancer Brother     Throat cancer Brother         non smoker    No Known Problems Son     Diabetes Maternal Uncle         1/2 brother    Cancer Paternal Aunt     Early death Paternal Aunt     Leukemia Paternal Aunt         highschool    Breast cancer Paternal Aunt     Heart disease Paternal Uncle     Rheum arthritis Maternal Grandmother     Pneumonia Maternal Grandfather     Cancer Paternal Grandmother     Breast cancer Paternal Grandmother     Early death Paternal Grandfather          killed in line of duty     Social History     Tobacco Use    Smoking status: Former Smoker     Packs/day: 1.00     Years: 10.00     Pack years: 10.00     Types: Cigarettes     Last attempt to quit: 1972     Years since quittin.3    Smokeless tobacco: Never Used    Tobacco comment: quit    Substance Use Topics    Alcohol use: No    Drug use: No     Review of Systems   Constitutional: Negative for activity change, appetite change, chills and fever.   HENT: Negative for trouble swallowing and voice change.    Eyes: Negative for visual disturbance.   Respiratory: Negative for cough, chest tightness and shortness of breath.     Cardiovascular: Negative for chest pain and leg swelling.   Gastrointestinal: Negative for abdominal pain, diarrhea, nausea and vomiting.   Genitourinary: Negative for difficulty urinating.   Musculoskeletal: Positive for back pain. Negative for neck pain and neck stiffness.   Skin: Negative for color change, pallor, rash and wound.   Neurological: Negative for dizziness, syncope, light-headedness, numbness and headaches.   Hematological: Does not bruise/bleed easily.   Psychiatric/Behavioral: Negative for confusion.       Physical Exam     Initial Vitals [04/09/19 2117]   BP Pulse Resp Temp SpO2   (!) 228/94 60 16 98.5 °F (36.9 °C) 95 %      MAP       --         Physical Exam    Nursing note and vitals reviewed.  Constitutional: She appears well-developed and well-nourished. She is not diaphoretic. No distress.   HENT:   Head: Normocephalic and atraumatic.   Right Ear: External ear normal.   Left Ear: External ear normal.   Nose: Nose normal.   Eyes: Conjunctivae and EOM are normal.   Neck: Normal range of motion. Neck supple. No tracheal deviation present. No JVD present.   Cardiovascular: Normal rate, regular rhythm and normal heart sounds.   Pulmonary/Chest: Breath sounds normal. No stridor. No respiratory distress.   Musculoskeletal: Normal range of motion. She exhibits no edema.   Right lower lumbar paraspinal TTP, +2 pedal pulse, no decreased sensation    Neurological: She is alert and oriented to person, place, and time. She has normal strength. No sensory deficit. GCS score is 15. GCS eye subscore is 4. GCS verbal subscore is 5. GCS motor subscore is 6.   Skin: Skin is warm and dry. Capillary refill takes less than 2 seconds. No rash and no abscess noted. No erythema.   Psychiatric: She has a normal mood and affect. Her behavior is normal. Thought content normal.         ED Course   Procedures  Labs Reviewed - No data to display       Imaging Results    None          Medical Decision Making:   History:    Old Medical Records: I decided to obtain old medical records.  Differential Diagnosis:   Lumbar pain with radiculopathy  Saddle anesthesia  Cauda equina  Spinal abscess        APC / Resident Notes:   The patient's back pain is likely a musculoskeletal strain with radiculopathy.  There are no signs of saddle anesthesia, incontinence, neurologic deficits, fevers, trauma or midline tenderness on history or physical to suggest cauda equina, infectious process, fracture or subluxation. Pt given IM dilaudid. Pt instructed to keep appointment with back and spine clinic tomorrow. I have discussed pt with Dr Garcia who agrees with POC. Pt voices understanding and is agreeable to the plan.  She is given specific return precautions.                    Clinical Impression:       ICD-10-CM ICD-9-CM   1. Lumbar pain with radiation down right leg M54.5 724.2         Disposition:   Disposition: Discharged  Condition: Stable                        Analilia Emanuel NP  04/10/19 0021

## 2019-04-15 ENCOUNTER — HOSPITAL ENCOUNTER (OUTPATIENT)
Facility: AMBULARY SURGERY CENTER | Age: 74
Discharge: HOME | End: 2019-04-15
Attending: ANESTHESIOLOGY | Admitting: ANESTHESIOLOGY
Payer: MEDICARE

## 2019-04-15 DIAGNOSIS — M54.16 LUMBAR RADICULITIS: ICD-10-CM

## 2019-04-15 DIAGNOSIS — M47.896 OTHER SPONDYLOSIS, LUMBAR REGION: Primary | ICD-10-CM

## 2019-04-15 PROCEDURE — 99152 PR MOD CONSCIOUS SEDATION, SAME PHYS, 5+ YRS, FIRST 15 MIN: ICD-10-PCS | Mod: ,,, | Performed by: ANESTHESIOLOGY

## 2019-04-15 PROCEDURE — 64484 NJX AA&/STRD TFRM EPI L/S EA: CPT | Mod: RT,,, | Performed by: ANESTHESIOLOGY

## 2019-04-15 PROCEDURE — 99152 MOD SED SAME PHYS/QHP 5/>YRS: CPT | Mod: ,,, | Performed by: ANESTHESIOLOGY

## 2019-04-15 PROCEDURE — 64484 PRA INJECT ANES/STEROID FORAMEN LUMBAR/SACRAL W IMG GUIDE ,EA ADD LEVEL: ICD-10-PCS | Mod: RT,,, | Performed by: ANESTHESIOLOGY

## 2019-04-15 PROCEDURE — 64483 NJX AA&/STRD TFRM EPI L/S 1: CPT | Performed by: ANESTHESIOLOGY

## 2019-04-15 PROCEDURE — 64483 PR EPIDURAL INJ, ANES/STEROID, TRANSFORAMINAL, LUMB/SACR, SNGL LEVL: ICD-10-PCS | Mod: RT,,, | Performed by: ANESTHESIOLOGY

## 2019-04-15 PROCEDURE — 64483 NJX AA&/STRD TFRM EPI L/S 1: CPT | Mod: RT,,, | Performed by: ANESTHESIOLOGY

## 2019-04-15 PROCEDURE — 64484 NJX AA&/STRD TFRM EPI L/S EA: CPT | Performed by: ANESTHESIOLOGY

## 2019-04-15 RX ORDER — SODIUM CHLORIDE, SODIUM LACTATE, POTASSIUM CHLORIDE, CALCIUM CHLORIDE 600; 310; 30; 20 MG/100ML; MG/100ML; MG/100ML; MG/100ML
INJECTION, SOLUTION INTRAVENOUS ONCE AS NEEDED
Status: DISCONTINUED | OUTPATIENT
Start: 2019-04-15 | End: 2019-04-15

## 2019-04-15 RX ORDER — ALPRAZOLAM 1 MG/1
1 TABLET ORAL
Status: COMPLETED | OUTPATIENT
Start: 2019-04-15 | End: 2019-04-15

## 2019-04-15 RX ORDER — DEXAMETHASONE SODIUM PHOSPHATE 10 MG/ML
INJECTION INTRAMUSCULAR; INTRAVENOUS
Status: DISCONTINUED
Start: 2019-04-15 | End: 2019-04-15 | Stop reason: HOSPADM

## 2019-04-15 RX ORDER — LIDOCAINE HYDROCHLORIDE 10 MG/ML
INJECTION, SOLUTION EPIDURAL; INFILTRATION; INTRACAUDAL; PERINEURAL
Status: DISCONTINUED | OUTPATIENT
Start: 2019-04-15 | End: 2019-04-15 | Stop reason: HOSPADM

## 2019-04-15 RX ORDER — ALPRAZOLAM 1 MG/1
TABLET ORAL
Status: COMPLETED
Start: 2019-04-15 | End: 2019-04-15

## 2019-04-15 RX ORDER — DEXAMETHASONE SODIUM PHOSPHATE 10 MG/ML
INJECTION INTRAMUSCULAR; INTRAVENOUS
Status: DISCONTINUED | OUTPATIENT
Start: 2019-04-15 | End: 2019-04-15 | Stop reason: HOSPADM

## 2019-04-15 RX ORDER — BUPIVACAINE HYDROCHLORIDE 2.5 MG/ML
INJECTION, SOLUTION EPIDURAL; INFILTRATION; INTRACAUDAL
Status: DISCONTINUED | OUTPATIENT
Start: 2019-04-15 | End: 2019-04-15 | Stop reason: HOSPADM

## 2019-04-15 RX ADMIN — ALPRAZOLAM 1 MG: 1 TABLET ORAL at 01:04

## 2019-04-15 NOTE — DISCHARGE INSTRUCTIONS
Recovery After Procedural Sedation (Adult)  You have been given medicine by vein to make you sleep during your surgery. This may have included both a pain medicine and sleeping medicine. Most of the effects have worn off. But you may still have some drowsiness for the next 6 to 8 hours.  Home care  Follow these guidelines when you get home:  · For the next 8 hours, you should be watched by a responsible adult. This person should make sure your condition is not getting worse.  · Don't drink any alcohol for the next 24 hours.  · Don't drive, operate dangerous machinery, or make important business or personal decisions during the next 24 hours.  Note: Your healthcare provider may tell you not to take any medicine by mouth for pain or sleep in the next 4 hours. These medicines may react with the medicines you were given in the hospital. This could cause a much stronger response than usual.  Follow-up care  Follow up with your healthcare provider if you are not alert and back to your usual level of activity within 12 hours.  When to seek medical advice  Call your healthcare provider right away if any of these occur:  · Drowsiness gets worse  · Weakness or dizziness gets worse  · Repeated vomiting  · You can't be awakened   Date Last Reviewed: 10/18/2016  © 1519-7246 [employer]. [address] [place] [address], [place] [address]. All rights reserved. This information is not intended as a substitute for professional medical care. Always follow your healthcare professional's instructions.      Pain injection instructions:     This procedure may take a couple weeks to relieve pain  You may get some pain relief from the local anesthetic initally.    No driving for 24 hrs.   Activity as tolerated- gradually increase activities.  Dont lift over 10 lbs for 24 hrs   No heat at injection sites x 2 days. No heating pads, hot tubs, saunas, or swimming in any body of water or pool for 2 days.  Use ice pack for mild swelling  and for comfort , apply for 20 minutes, remove for 20 minute intervals. No direct contact of ice itself  to skin.  May shower today. No tub baths for two days.      Resume Aspirin, Plavix, or Coumadin the day after the procedure unless otherwise instructed.   If diabetic,monitor your glucose carefully as steroids can increase your glucose level    Seek immediate medical help for:   Severe increase in your usual pain or appearance of new pain.  Prolonged (mor than 8 hours) or increasing weakness or numbness in the legs or arms. Numbing medicine was injected and can affect the messages to and from the brain and legs or arms.  .    Fever above 101 ,Drainage,redness,active bleeding, or increased swelling at the injection site.  Headache, shortness of breath, chest pain, or breathing problems.  Before leaving, please make sure you have all your personal belongings such as glasses, purses, wallets, keys, cell phones, jewelry, jackets etc

## 2019-04-15 NOTE — DISCHARGE SUMMARY
[facility]  Discharge Note  Short Stay    Admit Date: 4/15/2019    Discharge Date and Time: 4/15/2019    Attending Physician: Fernando Jenkins MD     Discharge Provider: Fernando Jenkins    Diagnoses:  Active Hospital Problems    Diagnosis  POA    *Lumbar radiculitis [M54.16]  Yes      Resolved Hospital Problems   No resolved problems to display.       Hospital Course: Lumbar SHAWNA  Discharged Condition: Good    Final Diagnoses:   Active Hospital Problems    Diagnosis  POA    *Lumbar radiculitis [M54.16]  Yes      Resolved Hospital Problems   No resolved problems to display.       Disposition: Home or Self Care    Follow up/Patient Instructions:    Medications:  Reconciled Home Medications:      Medication List      CONTINUE taking these medications    * albuterol 90 mcg/actuation inhaler  Commonly known as:  PROAIR HFA  Inhale 2 puffs into the lungs every 6 (six) hours as needed for Wheezing.     * albuterol 1.25 mg/3 mL Nebu  Commonly known as:  ACCUNEB  Take 3 mLs (1.25 mg total) by nebulization every 6 (six) hours as needed (shortness of breath, or wheezing). Rescue     artificial saliva (yerbas-lyt) Spra  Use as directed     aspirin 81 mg Tab  Take 81 mg by mouth once daily. Every day     atenolol 100 MG tablet  Commonly known as:  TENORMIN  TAKE 1 TABLET DAILY     azelastine 137 mcg (0.1 %) nasal spray  Commonly known as:  ASTELIN  1 spray by Nasal route 2 (two) times daily.     benzonatate 200 MG capsule  Commonly known as:  TESSALON  Take 200 mg by mouth 3 (three) times daily as needed for Cough.     cholecalciferol (vitamin D3) 5,000 unit capsule  Take 5,000 Units by mouth once daily. 5 a week     COQ-10 ORAL  Take 1 capsule by mouth once daily.     cyanocobalamin 100 MCG tablet  Commonly known as:  VITAMIN B-12  Take 100 mcg by mouth once daily.     ENBREL 50 mg/mL (0.98 mL) Syrg injection  Generic drug:  etanercept  50 mg once a week. once weekly     fluticasone 50 mcg/actuation nasal spray  Commonly known  as:  FLONASE  2 sprays by Each Nare route once daily. Every day     gabapentin 300 MG capsule  Commonly known as:  NEURONTIN  Take 1 capsule (300 mg total) by mouth every evening.     Lactobacillus rhamnosus GG 10 billion cell capsule  Commonly known as:  CULTURELLE  Take 1 capsule by mouth once daily.     magnesium oxide 400 mg (241.3 mg magnesium) tablet  Commonly known as:  MAG-OX  Take 400 mg by mouth once daily.     methylPREDNISolone 4 mg tablet  Commonly known as:  MEDROL DOSEPACK  use as directed     montelukast 10 mg tablet  Commonly known as:  SINGULAIR  Take 1 tablet (10 mg total) by mouth every evening.     omeprazole 40 MG capsule  Commonly known as:  PRILOSEC  TAKE 1 CAPSULE TWICE DAILY BEFORE MEALS     OS-ADDY 500 + D3 500 mg(1,250mg) -125 unit per tablet  Generic drug:  calcium carbonate-vitamin D3  Take 1 tablet by mouth once daily. Twice a day     oxyCODONE-acetaminophen  mg per tablet  Commonly known as:  PERCOCET  Take 1 tablet by mouth every 4 to 6 hours as needed for Pain.     QVAR 80 mcg/actuation Aero  Generic drug:  beclomethasone  Inhale 1 puff into the lungs 2 (two) times daily. Controller     salsalate 500 MG Tab  Commonly known as:  DISALCID  500 mg daily as needed. Twice a day     VOLTAREN 1 % Gel  Generic drug:  diclofenac sodium  Apply 2 g topically once daily.     ZyrTEC 10 MG tablet  Generic drug:  cetirizine  Take 10 mg by mouth once daily. Every day         * This list has 2 medication(s) that are the same as other medications prescribed for you. Read the directions carefully, and ask your doctor or other care provider to review them with you.            ASK your doctor about these medications    amLODIPine 5 MG tablet  Commonly known as:  NORVASC  Take 5 mg by mouth once daily.          Discharge Procedure Orders   Call MD for:  temperature >100.4     Call MD for:  persistent nausea and vomiting or diarrhea     Call MD for:  severe uncontrolled pain     Call MD for:   redness, tenderness, or signs of infection (pain, swelling, redness, odor or green/yellow discharge around incision site)     Call MD for:  difficulty breathing or increased cough     Call MD for:  severe persistent headache        Follow up with MD in 2-3 weeks    Discharge Procedure Orders (must include Diet, Follow-up, Activity):   Discharge Procedure Orders (must include Diet, Follow-up, Activity)   Call MD for:  temperature >100.4     Call MD for:  persistent nausea and vomiting or diarrhea     Call MD for:  severe uncontrolled pain     Call MD for:  redness, tenderness, or signs of infection (pain, swelling, redness, odor or green/yellow discharge around incision site)     Call MD for:  difficulty breathing or increased cough     Call MD for:  severe persistent headache

## 2019-04-15 NOTE — PLAN OF CARE
Pt states ready to go home , stable, asuncion po fluids, ambulatory, denies pain, Leg raises performed in bed without diff and instructed on fall risk. PT and spouse verbalized understanding

## 2019-04-15 NOTE — OP NOTE
PROCEDURE DATE: 4/15/2019    PROCEDURE: Right L4-5 and L5-S1 transforaminal epidural steroid injection under fluoroscopy    DIAGNOSIS: Lumbar disc displacement without myelopathy  Post op diagnosis: Same    PHYSICIAN: Fernando Jenkins MD    MEDICATIONS INJECTED:  Dexamethasone 5mg (0.5ml) and 1.5ml 0.25% bupivicaine at each nerve root.     LOCAL ANESTHETIC INJECTED:  Lidocaine 1%. 2 ml per site.    SEDATION MEDICATIONS: RN IV sedation    ESTIMATED BLOOD LOSS:  None    COMPLICATIONS:  NOne    TECHNIQUE:   A time-out was taken to identify patient and procedure side prior to starting the procedure. The patient was placed in a prone position, prepped and draped in the usual sterile fashion using ChloraPrep and sterile towels.  The area to be injected was determined under fluoroscopic guidance in AP and oblique view.  Local anesthetic was given by raising a wheal and going down to the hub of a 25-gauge 1.5 inch needle.  In oblique view, a 5 inch 22-gauge bent-tip spinal needle was introduced towards 6 oclock position of the pedicle of each above named nerve root level.  The needle was walked medially then hinged into the neural foramen and position was confirmed in AP and lateral views.  1ml contrast dye was injected to confirm appropriate placement and that there was no vascular uptake.  After negative aspiration for blood or CSF, the medication was then injected. This was performed at the right L4-5 and L5-S1 level(s). The patient tolerated the procedure well.    The patient was monitored after the procedure.  Patient was given post procedure and discharge instructions to follow at home. The patient was discharged in a stable condition.

## 2019-04-16 VITALS
HEIGHT: 60 IN | HEART RATE: 57 BPM | WEIGHT: 205 LBS | OXYGEN SATURATION: 93 % | DIASTOLIC BLOOD PRESSURE: 73 MMHG | TEMPERATURE: 98 F | BODY MASS INDEX: 40.25 KG/M2 | SYSTOLIC BLOOD PRESSURE: 167 MMHG | RESPIRATION RATE: 18 BRPM

## 2019-04-25 ENCOUNTER — PATIENT OUTREACH (OUTPATIENT)
Dept: ADMINISTRATIVE | Facility: HOSPITAL | Age: 74
End: 2019-04-25

## 2019-05-01 ENCOUNTER — DOCUMENTATION ONLY (OUTPATIENT)
Dept: FAMILY MEDICINE | Facility: CLINIC | Age: 74
End: 2019-05-01

## 2019-05-01 NOTE — PROGRESS NOTES
Pre-Visit Chart Review  For Appointment Scheduled on 5-10-19    Health Maintenance Due   Topic Date Due    Zoster Vaccine  10/31/2004

## 2019-05-10 ENCOUNTER — OFFICE VISIT (OUTPATIENT)
Dept: FAMILY MEDICINE | Facility: CLINIC | Age: 74
End: 2019-05-10
Attending: FAMILY MEDICINE
Payer: MEDICARE

## 2019-05-10 ENCOUNTER — LAB VISIT (OUTPATIENT)
Dept: LAB | Facility: HOSPITAL | Age: 74
End: 2019-05-10
Attending: FAMILY MEDICINE
Payer: MEDICARE

## 2019-05-10 VITALS
DIASTOLIC BLOOD PRESSURE: 72 MMHG | BODY MASS INDEX: 40.3 KG/M2 | WEIGHT: 205.25 LBS | SYSTOLIC BLOOD PRESSURE: 122 MMHG | HEART RATE: 53 BPM | HEIGHT: 60 IN | TEMPERATURE: 98 F

## 2019-05-10 DIAGNOSIS — D89.89 OTHER SPECIFIED DISORDERS INVOLVING THE IMMUNE MECHANISM, NOT ELSEWHERE CLASSIFIED: ICD-10-CM

## 2019-05-10 DIAGNOSIS — R73.9 HYPERGLYCEMIA: ICD-10-CM

## 2019-05-10 DIAGNOSIS — I10 ESSENTIAL HYPERTENSION: ICD-10-CM

## 2019-05-10 DIAGNOSIS — J30.9 CHRONIC ALLERGIC RHINITIS: ICD-10-CM

## 2019-05-10 DIAGNOSIS — E55.9 VITAMIN D DEFICIENCY: ICD-10-CM

## 2019-05-10 DIAGNOSIS — Z83.49 FAMILY HISTORY OF THYROID DISEASE: ICD-10-CM

## 2019-05-10 DIAGNOSIS — M05.79 RHEUMATOID ARTHRITIS INVOLVING MULTIPLE SITES WITH POSITIVE RHEUMATOID FACTOR: Primary | ICD-10-CM

## 2019-05-10 DIAGNOSIS — E78.5 HYPERLIPIDEMIA, UNSPECIFIED HYPERLIPIDEMIA TYPE: ICD-10-CM

## 2019-05-10 DIAGNOSIS — M54.16 LUMBAR RADICULITIS: ICD-10-CM

## 2019-05-10 DIAGNOSIS — J45.40 MODERATE PERSISTENT ASTHMA WITHOUT COMPLICATION: ICD-10-CM

## 2019-05-10 DIAGNOSIS — E66.01 MORBID OBESITY WITH BMI OF 40.0-44.9, ADULT: ICD-10-CM

## 2019-05-10 LAB
25(OH)D3+25(OH)D2 SERPL-MCNC: 33 NG/ML (ref 30–96)
ALBUMIN SERPL BCP-MCNC: 3.7 G/DL (ref 3.5–5.2)
ALP SERPL-CCNC: 95 U/L (ref 55–135)
ALT SERPL W/O P-5'-P-CCNC: 14 U/L (ref 10–44)
ANION GAP SERPL CALC-SCNC: 8 MMOL/L (ref 8–16)
AST SERPL-CCNC: 16 U/L (ref 10–40)
BASOPHILS # BLD AUTO: 0.06 K/UL (ref 0–0.2)
BASOPHILS NFR BLD: 0.8 % (ref 0–1.9)
BILIRUB SERPL-MCNC: 0.6 MG/DL (ref 0.1–1)
BUN SERPL-MCNC: 16 MG/DL (ref 8–23)
CALCIUM SERPL-MCNC: 9.5 MG/DL (ref 8.7–10.5)
CHLORIDE SERPL-SCNC: 103 MMOL/L (ref 95–110)
CHOLEST SERPL-MCNC: 195 MG/DL (ref 120–199)
CHOLEST/HDLC SERPL: 3.5 {RATIO} (ref 2–5)
CO2 SERPL-SCNC: 30 MMOL/L (ref 23–29)
CREAT SERPL-MCNC: 0.7 MG/DL (ref 0.5–1.4)
DIFFERENTIAL METHOD: ABNORMAL
EOSINOPHIL # BLD AUTO: 0.3 K/UL (ref 0–0.5)
EOSINOPHIL NFR BLD: 3.2 % (ref 0–8)
ERYTHROCYTE [DISTWIDTH] IN BLOOD BY AUTOMATED COUNT: 13.4 % (ref 11.5–14.5)
EST. GFR  (AFRICAN AMERICAN): >60 ML/MIN/1.73 M^2
EST. GFR  (NON AFRICAN AMERICAN): >60 ML/MIN/1.73 M^2
ESTIMATED AVG GLUCOSE: 126 MG/DL (ref 68–131)
GLUCOSE SERPL-MCNC: 84 MG/DL (ref 70–110)
HBA1C MFR BLD HPLC: 6 % (ref 4–5.6)
HCT VFR BLD AUTO: 46.4 % (ref 37–48.5)
HDLC SERPL-MCNC: 55 MG/DL (ref 40–75)
HDLC SERPL: 28.2 % (ref 20–50)
HGB BLD-MCNC: 14.4 G/DL (ref 12–16)
IMM GRANULOCYTES # BLD AUTO: 0.02 K/UL (ref 0–0.04)
IMM GRANULOCYTES NFR BLD AUTO: 0.3 % (ref 0–0.5)
LDLC SERPL CALC-MCNC: 90.6 MG/DL (ref 63–159)
LYMPHOCYTES # BLD AUTO: 2.5 K/UL (ref 1–4.8)
LYMPHOCYTES NFR BLD: 31.8 % (ref 18–48)
MCH RBC QN AUTO: 29.7 PG (ref 27–31)
MCHC RBC AUTO-ENTMCNC: 31 G/DL (ref 32–36)
MCV RBC AUTO: 96 FL (ref 82–98)
MONOCYTES # BLD AUTO: 0.7 K/UL (ref 0.3–1)
MONOCYTES NFR BLD: 8.6 % (ref 4–15)
NEUTROPHILS # BLD AUTO: 4.4 K/UL (ref 1.8–7.7)
NEUTROPHILS NFR BLD: 55.3 % (ref 38–73)
NONHDLC SERPL-MCNC: 140 MG/DL
NRBC BLD-RTO: 0 /100 WBC
PLATELET # BLD AUTO: 203 K/UL (ref 150–350)
PMV BLD AUTO: 10.2 FL (ref 9.2–12.9)
POTASSIUM SERPL-SCNC: 4.8 MMOL/L (ref 3.5–5.1)
PROT SERPL-MCNC: 7.7 G/DL (ref 6–8.4)
RBC # BLD AUTO: 4.85 M/UL (ref 4–5.4)
SODIUM SERPL-SCNC: 141 MMOL/L (ref 136–145)
TRIGL SERPL-MCNC: 247 MG/DL (ref 30–150)
TSH SERPL DL<=0.005 MIU/L-ACNC: 1.28 UIU/ML (ref 0.4–4)
WBC # BLD AUTO: 7.87 K/UL (ref 3.9–12.7)

## 2019-05-10 PROCEDURE — 99215 PR OFFICE/OUTPT VISIT, EST, LEVL V, 40-54 MIN: ICD-10-PCS | Mod: S$PBB,,, | Performed by: FAMILY MEDICINE

## 2019-05-10 PROCEDURE — 80053 COMPREHEN METABOLIC PANEL: CPT

## 2019-05-10 PROCEDURE — 99999 PR PBB SHADOW E&M-EST. PATIENT-LVL III: CPT | Mod: PBBFAC,,, | Performed by: FAMILY MEDICINE

## 2019-05-10 PROCEDURE — 83036 HEMOGLOBIN GLYCOSYLATED A1C: CPT

## 2019-05-10 PROCEDURE — 84443 ASSAY THYROID STIM HORMONE: CPT

## 2019-05-10 PROCEDURE — 36415 COLL VENOUS BLD VENIPUNCTURE: CPT | Mod: PO

## 2019-05-10 PROCEDURE — 80061 LIPID PANEL: CPT

## 2019-05-10 PROCEDURE — 99213 OFFICE O/P EST LOW 20 MIN: CPT | Mod: PBBFAC,PO | Performed by: FAMILY MEDICINE

## 2019-05-10 PROCEDURE — 82306 VITAMIN D 25 HYDROXY: CPT

## 2019-05-10 PROCEDURE — 99215 OFFICE O/P EST HI 40 MIN: CPT | Mod: S$PBB,,, | Performed by: FAMILY MEDICINE

## 2019-05-10 PROCEDURE — 99999 PR PBB SHADOW E&M-EST. PATIENT-LVL III: ICD-10-PCS | Mod: PBBFAC,,, | Performed by: FAMILY MEDICINE

## 2019-05-10 PROCEDURE — 85025 COMPLETE CBC W/AUTO DIFF WBC: CPT

## 2019-05-10 NOTE — PROGRESS NOTES
Subjective:       Patient ID: Ling Rapp is a 73 y.o. female.    Chief Complaint: Annual Exam    High at this 73-year-old female coming in for follow-up of multiple medical problems.  She has a history of rheumatoid arthritis with intolerance of methotrexate secondary to pancreatitis and followed by Dr. Silvestre.  She has atelectasis and asthma followed by Pulmonary and has L4-5 subluxation and herniation followed by pain management.  She has very limited mobility compounded by weight and needs a Rollator walker for ambulation.  She is not able to put full weight on her right leg secondary to radiculopathy.  Her pain management physician had attempted to get one but her insurance company says that they have not received any of the forms from pain management.  She has additional history of hypertension, hyperlipidemia, reflux, hyperglycemia, the pancreatitis and vitamin-D deficiency.  She is concerned about thyroid disease which runs in her family and has not been checked in several years but she has no specific symptoms of hypo or hyperthyroidism.    Past Medical History:  No date: ALLERGIC RHINITIS  No date: Anticoagulant long-term use  No date: Asthma  No date: Cataract  No date: Former smoker  No date: GERD (gastroesophageal reflux disease)  No date: Hemorrhoids  No date: Hypertension  No date: DANE (obstructive sleep apnea)  No date: Osteopenia  No date: Pancreatitis      Comment:  secondary to severe hypertriglyceridemia  No date: Peptic ulcer  No date: Rheumatoid arthritis  No date: Vitamin D deficiency    Past Surgical History:  No date: Athroscopy(knee)  2017: BREAST BIOPSY; Left      Comment:  benign  No date: CARDIAC CATHETERIZATION  2013: CATARACT EXTRACTION; Left  3/30/2016: COLONOSCOPY; N/A      Comment:  Performed by Thee Sorenson MD at [facility] ENDO  5/21/2013: EGD (ESOPHAGOGASTRODUODENOSCOPY); N/A      Comment:  Performed by Thee Sorenson MD at [facility] ENDO  No date: EYE SURGERY       Comment:  cataracts bilateral   1997: FRACTURE SURGERY      Comment:  right knee   1982: HYSTERECTOMY  4/15/2019: Injection,steroid,epidural,transforaminal approach; Right      Comment:  Performed by Fernando Jenkins MD at [facility] OR  No date: PARTIAL HYSTERECTOMY      Comment:  w/ USO  No date: TONSILLECTOMY  5/2013: UPPER GASTROINTESTINAL ENDOSCOPY    Review of patient's family history indicates:  Problem: Colon polyps      Relation: Sister          Age of Onset: 64  Problem: Ulcers      Relation: Father          Age of Onset: (Not Specified)  Problem: Emphysema      Relation: Father          Age of Onset: (Not Specified)          Comment: smoker  Problem: Alcohol abuse      Relation: Father          Age of Onset: (Not Specified)  Problem: Thyroid disease      Relation: Mother          Age of Onset: (Not Specified)  Problem: Heart disease      Relation: Mother          Age of Onset: (Not Specified)  Problem: Emphysema      Relation: Mother          Age of Onset: (Not Specified)          Comment: smoker  Problem: Alzheimer's disease      Relation: Brother          Age of Onset: (Not Specified)  Problem: Stroke      Relation: Brother          Age of Onset: (Not Specified)  Problem: Cancer      Relation: Brother          Age of Onset: (Not Specified)  Problem: Throat cancer      Relation: Brother          Age of Onset: (Not Specified)          Comment: non smoker  Problem: No Known Problems      Relation: Son          Age of Onset: (Not Specified)  Problem: Diabetes      Relation: Maternal Uncle          Age of Onset: (Not Specified)          Comment: 1/2 brother  Problem: Cancer      Relation: Paternal Aunt          Age of Onset: (Not Specified)  Problem: Early death      Relation: Paternal Aunt          Age of Onset: (Not Specified)  Problem: Leukemia      Relation: Paternal Aunt          Age of Onset: (Not Specified)          Comment: highschool  Problem: Breast cancer      Relation: Paternal Aunt          Age of Onset:  (Not Specified)  Problem: Heart disease      Relation: Paternal Uncle          Age of Onset: (Not Specified)  Problem: Rheum arthritis      Relation: Maternal Grandmother          Age of Onset: (Not Specified)  Problem: Pneumonia      Relation: Maternal Grandfather          Age of Onset: (Not Specified)  Problem: Cancer      Relation: Paternal Grandmother          Age of Onset: (Not Specified)  Problem: Breast cancer      Relation: Paternal Grandmother          Age of Onset: (Not Specified)  Problem: Early death      Relation: Paternal Grandfather          Age of Onset: (Not Specified)          Comment:  killed in line of duty    Social History    Tobacco Use      Smoking status: Former Smoker        Packs/day: 1.00        Years: 10.00        Pack years: 10        Types: Cigarettes        Quit date: 1972        Years since quittin.3      Smokeless tobacco: Never Used      Tobacco comment: quit     Alcohol use: No    Drug use: No    Current Outpatient Medications on File Prior to Visit:  albuterol (ACCUNEB) 1.25 mg/3 mL Nebu, Take 3 mLs (1.25 mg total) by nebulization every 6 (six) hours as needed (shortness of breath, or wheezing). Rescue, Disp: 1 Box, Rfl: 3  albuterol (PROAIR HFA) 90 mcg/actuation inhaler, Inhale 2 puffs into the lungs every 6 (six) hours as needed for Wheezing., Disp: 3 Inhaler, Rfl: 5  artificial saliva, yerbas-lyt, SprA, Use as directed, Disp: 240 mL, Rfl: 11  aspirin 81 mg Tab, Take 81 mg by mouth once daily. Every day, Disp: , Rfl:   atenolol (TENORMIN) 100 MG tablet, TAKE 1 TABLET DAILY, Disp: 90 tablet, Rfl: 2  azelastine (ASTELIN) 137 mcg (0.1 %) nasal spray, 1 spray by Nasal route 2 (two) times daily., Disp: , Rfl:   beclomethasone (QVAR) 80 mcg/actuation Aero, Inhale 1 puff into the lungs 2 (two) times daily. Controller, Disp: , Rfl:   benzonatate (TESSALON) 200 MG capsule, Take 200 mg by mouth 3 (three) times daily as needed for Cough., Disp: , Rfl:   CALCIUM  CARBONATE/VITAMIN D3 (OS-ADDY 500 + D) 500-125 mg-unit Tab, Take 1 tablet by mouth once daily. Twice a day, Disp: , Rfl:   cetirizine (ZYRTEC) 10 MG tablet, Take 10 mg by mouth once daily. Every day, Disp: , Rfl:   cholecalciferol, vitamin D3, 5,000 unit capsule, Take 5,000 Units by mouth once daily. 5 a week, Disp: , Rfl:   cyanocobalamin (VITAMIN B-12) 100 MCG tablet, Take 100 mcg by mouth once daily., Disp: , Rfl:   diclofenac sodium (VOLTAREN) 1 % Gel, Apply 2 g topically once daily., Disp: , Rfl:   etanercept (ENBREL) 50 mg/mL (0.98 mL) injection, 50 mg once a week. once weekly, Disp: , Rfl:   fluticasone (FLONASE) 50 mcg/actuation nasal spray, 2 sprays by Each Nare route once daily. Every day, Disp: 48 g, Rfl: 3  gabapentin (NEURONTIN) 300 MG capsule, Take 1 capsule (300 mg total) by mouth every evening., Disp: 30 capsule, Rfl: 1  magnesium oxide (MAG-OX) 400 mg tablet, Take 400 mg by mouth once daily., Disp: , Rfl:   montelukast (SINGULAIR) 10 mg tablet, Take 1 tablet (10 mg total) by mouth every evening., Disp: 30 tablet, Rfl: 3  omeprazole (PRILOSEC) 40 MG capsule, TAKE 1 CAPSULE TWICE DAILY BEFORE MEALS, Disp: 180 capsule, Rfl: 2  oxyCODONE-acetaminophen (PERCOCET)  mg per tablet, Take 1 tablet by mouth every 4 to 6 hours as needed for Pain., Disp: 30 tablet, Rfl: 0  UBIDECARENONE (COQ-10 ORAL), Take 1 capsule by mouth once daily. , Disp: , Rfl:   (DISCONTINUED) amlodipine (NORVASC) 5 MG tablet, Take 5 mg by mouth once daily. , Disp: , Rfl:   (DISCONTINUED) Lactobacillus rhamnosus GG (CULTURELLE) 10 billion cell capsule, Take 1 capsule by mouth once daily., Disp: , Rfl:   (DISCONTINUED) salsalate (DISALCID) 500 MG tablet, 500 mg daily as needed. Twice a day, Disp: , Rfl:     No current facility-administered medications on file prior to visit.     Shingles Vaccine(1 of 2) due on 10/31/1994      Review of Systems   Constitutional: Negative for chills, diaphoresis, fatigue, fever and unexpected weight  change.   HENT: Positive for congestion, postnasal drip and rhinorrhea. Negative for ear pain, hearing loss, sinus pressure, sneezing, sore throat, tinnitus and trouble swallowing.    Eyes: Negative for itching and visual disturbance.   Respiratory: Negative for cough, chest tightness, shortness of breath and wheezing.    Cardiovascular: Negative for chest pain, palpitations and leg swelling.   Gastrointestinal: Negative for abdominal pain, blood in stool, constipation, diarrhea, nausea and vomiting.   Genitourinary: Negative for dysuria, frequency, hematuria, menstrual problem, pelvic pain, vaginal bleeding and vaginal discharge.   Musculoskeletal: Positive for arthralgias, back pain and gait problem. Negative for joint swelling and myalgias.   Neurological: Positive for weakness. Negative for dizziness and headaches.   Hematological: Negative for adenopathy.   Psychiatric/Behavioral: Negative for sleep disturbance. The patient is not nervous/anxious.        Objective:      Physical Exam   Constitutional: She is oriented to person, place, and time. She appears well-developed. No distress.   Good blood pressure control  Morbid obesity with a BMI of 40.1 she is up 3.8 lb from her March 8, 2018 visit with me   HENT:   Head: Normocephalic and atraumatic.   Right Ear: External ear normal.   Left Ear: External ear normal.   Mouth/Throat: Oropharynx is clear and moist. No oropharyngeal exudate.   Moderate nasal turbinate swelling with some very thick but clear exudate present worse on the right than the left   Eyes: Pupils are equal, round, and reactive to light. Conjunctivae and EOM are normal. Right eye exhibits no discharge. Left eye exhibits no discharge. No scleral icterus.   Neck: Normal range of motion. Neck supple. No JVD present. No tracheal deviation present. No thyromegaly present.   Cardiovascular: Normal rate, regular rhythm and normal heart sounds. Exam reveals no gallop and no friction rub.   No murmur  heard.  Pulmonary/Chest: Effort normal and breath sounds normal. No respiratory distress. She has no wheezes. She has no rales. She exhibits no tenderness.   Abdominal: Soft. Bowel sounds are normal. She exhibits no distension and no mass. There is no tenderness. There is no rebound and no guarding. No hernia.   Musculoskeletal: Normal range of motion. She exhibits no edema.   Very limited mobility, weakness right leg unable to use right leg to step up to exam table.  Very hesitant antalgic gait   Lymphadenopathy:     She has no cervical adenopathy.   Neurological: She is alert and oriented to person, place, and time. She has normal reflexes. She displays normal reflexes. No cranial nerve deficit or sensory deficit. She exhibits normal muscle tone.   Skin: Skin is warm and dry. No rash noted. She is not diaphoretic. No erythema.   Psychiatric: She has a normal mood and affect. Her behavior is normal. Judgment and thought content normal.   Nursing note and vitals reviewed.      Assessment:       1. Rheumatoid arthritis involving multiple sites with positive rheumatoid factor    2. Essential hypertension    3. Hyperlipidemia, unspecified hyperlipidemia type    4. Lumbar radiculitis    5. Moderate persistent asthma without complication    6. Vitamin D deficiency    7. Hyperglycemia    8. Chronic allergic rhinitis    9. Family history of thyroid disease    10. Morbid obesity with BMI of 40.0-44.9, adult    11. 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 Rheumatoid arthritis involving multiple sites with positive rheumatoid factor  Followed by Rheumatology, intolerant of methotrexate which cause multiple bouts of pancreatitis  - walker (ULTRA-LIGHT ROLLATOR) Misc; Use for ambulation; Refill: 0    2. Essential hypertension  Good control with no changes needed  - CBC auto differential; Future  - Comprehensive metabolic panel; Future    3. Hyperlipidemia,  unspecified hyperlipidemia type  Await labs  - Comprehensive metabolic panel; Future  - Lipid panel; Future    4. Lumbar radiculitis  Has had multiple epidural steroid injections in the last two months with partial relief.  Followed by pain management  - walker (ULTRA-LIGHT ROLLATOR) Misc; Use for ambulation; Refill: 0    5. Moderate persistent asthma without complication  Asymptomatic at present, followed by Pulmonary    6. Vitamin D deficiency  Await lab results  - Vitamin D; Future    7. Hyperglycemia  Await lab results  - Comprehensive metabolic panel; Future  - Hemoglobin A1c; Future    8. Chronic allergic rhinitis  Fair control but still mildly symptomatic    9. Family history of thyroid disease  Await lab results  - TSH; Future    10. Morbid obesity with BMI of 40.0-44.9, adult  - TSH; Future  - walker (ULTRA-LIGHT ROLLATOR) Misc; Use for ambulation; Refill: 0    11. Other specified disorders involving the immune mechanism, not elsewhere classified   - TSH; Future

## 2019-05-30 ENCOUNTER — OFFICE VISIT (OUTPATIENT)
Dept: RHEUMATOLOGY | Facility: CLINIC | Age: 74
End: 2019-05-30
Payer: MEDICARE

## 2019-05-30 VITALS
WEIGHT: 210.88 LBS | BODY MASS INDEX: 41.19 KG/M2 | DIASTOLIC BLOOD PRESSURE: 72 MMHG | SYSTOLIC BLOOD PRESSURE: 138 MMHG

## 2019-05-30 DIAGNOSIS — M05.79 RHEUMATOID ARTHRITIS INVOLVING MULTIPLE SITES WITH POSITIVE RHEUMATOID FACTOR: Primary | ICD-10-CM

## 2019-05-30 LAB — CRP SERPL-MCNC: 0.43 MG/DL (ref 0–1.4)

## 2019-05-30 PROCEDURE — 99213 OFFICE O/P EST LOW 20 MIN: CPT | Mod: ,,, | Performed by: INTERNAL MEDICINE

## 2019-05-30 PROCEDURE — 99213 PR OFFICE/OUTPT VISIT, EST, LEVL III, 20-29 MIN: ICD-10-PCS | Mod: ,,, | Performed by: INTERNAL MEDICINE

## 2019-06-01 LAB — RHEUMATOID FACT SERPL-ACNC: 11.1 IU/ML (ref 0–13.9)

## 2019-06-03 DIAGNOSIS — J45.909 ASTHMA, UNSPECIFIED ASTHMA SEVERITY, UNSPECIFIED WHETHER COMPLICATED, UNSPECIFIED WHETHER PERSISTENT: ICD-10-CM

## 2019-06-03 LAB — CCP ANTIBODIES IGG/IGA: >250 UNITS (ref 0–19)

## 2019-06-03 RX ORDER — MONTELUKAST SODIUM 10 MG/1
TABLET ORAL
Qty: 90 TABLET | Refills: 3 | Status: SHIPPED | OUTPATIENT
Start: 2019-06-03 | End: 2020-04-18 | Stop reason: SDUPTHER

## 2019-07-22 ENCOUNTER — OFFICE VISIT (OUTPATIENT)
Dept: PULMONOLOGY | Facility: CLINIC | Age: 74
End: 2019-07-22
Payer: MEDICARE

## 2019-07-22 VITALS
BODY MASS INDEX: 40.84 KG/M2 | OXYGEN SATURATION: 95 % | DIASTOLIC BLOOD PRESSURE: 80 MMHG | SYSTOLIC BLOOD PRESSURE: 130 MMHG | HEIGHT: 60 IN | HEART RATE: 64 BPM | WEIGHT: 208 LBS

## 2019-07-22 DIAGNOSIS — R09.02 HYPOXEMIA: ICD-10-CM

## 2019-07-22 DIAGNOSIS — G47.33 OSA ON CPAP: ICD-10-CM

## 2019-07-22 DIAGNOSIS — J45.901 ASTHMA WITH ACUTE EXACERBATION, UNSPECIFIED ASTHMA SEVERITY, UNSPECIFIED WHETHER PERSISTENT: Primary | ICD-10-CM

## 2019-07-22 PROCEDURE — 99214 PR OFFICE/OUTPT VISIT, EST, LEVL IV, 30-39 MIN: ICD-10-PCS | Mod: ,,, | Performed by: INTERNAL MEDICINE

## 2019-07-22 PROCEDURE — 99214 OFFICE O/P EST MOD 30 MIN: CPT | Mod: ,,, | Performed by: INTERNAL MEDICINE

## 2019-07-22 RX ORDER — PREDNISONE 10 MG/1
TABLET ORAL
Qty: 20 TABLET | Refills: 0 | Status: SHIPPED | OUTPATIENT
Start: 2019-07-22 | End: 2019-09-03

## 2019-07-22 NOTE — PROGRESS NOTES
SUBJECTIVE:    Patient ID: Ling Rapp is a 73 y.o. female.    Chief Complaint: Shortness of Breath (follow up has a cough for the past couple days )    HPI   Patient is here with cough.  She is in her green zone.  She is using Qvar and albuterol in the moirning.  She feels her chest gets tight and heaviness. Not producing any mucus.      She is doing well with her CPAP.  She is sleeping on it every night.  She gets good results from her CPAP.  She is bleeding in her oxygen to the CPAP.    Past Medical History:   Diagnosis Date    ALLERGIC RHINITIS     Anticoagulant long-term use     Asthma     Cataract     Former smoker     GERD (gastroesophageal reflux disease)     Hemorrhoids     Hypertension     DANE (obstructive sleep apnea)     Osteopenia     Pancreatitis     secondary to severe hypertriglyceridemia    Peptic ulcer     Rheumatoid arthritis     Vitamin D deficiency      Past Surgical History:   Procedure Laterality Date    Athroscopy(knee)      BREAST BIOPSY Left 2017    benign    CARDIAC CATHETERIZATION      CATARACT EXTRACTION Left 2013    COLONOSCOPY N/A 3/30/2016    [facility] ENDO    EGD (ESOPHAGOGASTRODUODENOSCOPY) N/A 5/21/2013    [facility] at [facility] ENDO    EYE SURGERY      cataracts bilateral     FRACTURE SURGERY  1997    right knee     HYSTERECTOMY  1982    Injection,steroid,epidural,transforaminal approach Right 4/15/2019    [facility]    PARTIAL HYSTERECTOMY      w/ USO    TONSILLECTOMY      UPPER GASTROINTESTINAL ENDOSCOPY  5/2013     Family History   Problem Relation Age of Onset    Colon polyps Sister 64    Ulcers Father     Emphysema Father         smoker    Alcohol abuse Father     Thyroid disease Mother     Heart disease Mother     Emphysema Mother         smoker    Alzheimer's disease Brother     Stroke Brother     Cancer Brother     Throat cancer Brother          non smoker    No Known Problems Son     Diabetes Maternal Uncle         1/2 brother    Cancer Paternal Aunt     Early death Paternal Aunt     Leukemia Paternal Aunt         highschool    Breast cancer Paternal Aunt     Heart disease Paternal Uncle     Rheum arthritis Maternal Grandmother     Pneumonia Maternal Grandfather     Cancer Paternal Grandmother     Breast cancer Paternal Grandmother     Early death Paternal Grandfather          killed in line of duty        Social History:   Marital Status:   Occupation:retired    Alcohol History:  reports that she does not drink alcohol.  Tobacco History:  reports that she quit smoking about 47 years ago. Her smoking use included cigarettes. She has a 10.00 pack-year smoking history. She has never used smokeless tobacco.  Drug History:  reports that she does not use drugs.    Review of patient's allergies indicates:   Allergen Reactions    Amoxicillin-pot clavulanate Diarrhea and Other (See Comments)     Sever cramping    Diltiazem Other (See Comments)     Extreme dry moth    Fenofibrate Other (See Comments)     Other reaction(s): Itching    Hydroxychloroquine      Other reaction(s): eye accomodation problems    Leflunomide      Other reaction(s): abd pains    Lisinopril-hydrochlorothiazide      Other reaction(s): diarrhea  Other reaction(s): cramps    Methotrexate      Other reaction(s): pancreatitis    Norvasc [amlodipine] Swelling    Sulfa (sulfonamide antibiotics)      Other reaction(s): Swelling  Other reaction(s): Hives    Sulfamethoxazole-trimethoprim        Current Outpatient Medications   Medication Sig Dispense Refill    albuterol (ACCUNEB) 1.25 mg/3 mL Nebu Take 3 mLs (1.25 mg total) by nebulization every 6 (six) hours as needed (shortness of breath, or wheezing). Rescue 1 Box 3    albuterol (PROAIR HFA) 90 mcg/actuation inhaler Inhale 2 puffs into the lungs every 6 (six) hours as needed for Wheezing. 3 Inhaler 5     artificial saliva, yerbas-lyt, SprA Use as directed 240 mL 11    aspirin 81 mg Tab Take 81 mg by mouth once daily. Every day      atenolol (TENORMIN) 100 MG tablet TAKE 1 TABLET DAILY 90 tablet 2    azelastine (ASTELIN) 137 mcg (0.1 %) nasal spray 1 spray by Nasal route 2 (two) times daily.      beclomethasone (QVAR) 80 mcg/actuation Aero Inhale 1 puff into the lungs 2 (two) times daily. Controller      benzonatate (TESSALON) 200 MG capsule Take 200 mg by mouth 3 (three) times daily as needed for Cough.      CALCIUM CARBONATE/VITAMIN D3 (OS-ADDY 500 + D) 500-125 mg-unit Tab Take 1 tablet by mouth once daily. Twice a day      cetirizine (ZYRTEC) 10 MG tablet Take 10 mg by mouth once daily. Every day      cholecalciferol, vitamin D3, 5,000 unit capsule Take 5,000 Units by mouth once daily. 5 a week      cyanocobalamin (VITAMIN B-12) 100 MCG tablet Take 100 mcg by mouth once daily.      diclofenac sodium (VOLTAREN) 1 % Gel Apply 2 g topically once daily.      etanercept (ENBREL) 50 mg/mL (0.98 mL) injection 50 mg once a week. once weekly      fluticasone (FLONASE) 50 mcg/actuation nasal spray 2 sprays by Each Nare route once daily. Every day 48 g 3    magnesium oxide (MAG-OX) 400 mg tablet Take 400 mg by mouth once daily.      montelukast (SINGULAIR) 10 mg tablet TAKE 1 TABLET EVERY EVENING 90 tablet 3    omeprazole (PRILOSEC) 40 MG capsule TAKE 1 CAPSULE TWICE DAILY BEFORE MEALS 180 capsule 2    oxyCODONE-acetaminophen (PERCOCET)  mg per tablet Take 1 tablet by mouth every 4 to 6 hours as needed for Pain. 30 tablet 0    UBIDECARENONE (COQ-10 ORAL) Take 1 capsule by mouth once daily.       walker (ULTRA-LIGHT ROLLATOR) Misc Use for ambulation  0    gabapentin (NEURONTIN) 300 MG capsule Take 1 capsule (300 mg total) by mouth every evening. 30 capsule 1    predniSONE (DELTASONE) 10 MG tablet Take 4 tabs x 2 days, then take 3 tabs x 2 days, then take 2 tabs x 2 days, then take 1 tab x 2 days. 20  tablet 0     No current facility-administered medications for this visit.        Last PFT: 09/17/2018  Last CT: 09/06/2018    Review of Systems   Respiratory: Positive for shortness of breath.      General: Feeling Well.  Eyes: Vision is good.  ENT:  Congestion better   Heart:: No chest pain or palpitations.  Lungs: She is coughing if she talks a lot or moves much, she is in her green zone, no mucus no fever  GI: reflux controlled with Prilosec  : No dysuria, hesitancy, or nocturia.  Musculoskeletal: No joint pain or myalgias.  Skin: No lesions or rashes.  Neuro: No headaches or neuropathy.  Lymph: No edema or adenopathy.  Psych: No anxiety or depression.  Endo: weight down a few pounds    OBJECTIVE:      /80 (BP Location: Left arm, Patient Position: Sitting)   Pulse 64   Ht 5' (1.524 m)   Wt 94.3 kg (208 lb)   SpO2 95% Comment: 2.0 oxygen level  BMI 40.62 kg/m²     Physical Exam  GENERAL: Older patient in no distress.  HEENT: Pupils equal and reactive. Extraocular movements intact. Nose intact.      Pharynx moist.  NECK: Supple.   HEART: Regular rate and rhythm. No murmur or gallop auscultated.  LUNGS: Clear to auscultation and percussion. Lung excursion symmetrical. No change in fremitus. No adventitial noises.  ABDOMEN: Bowel sounds present. Non-tender, no masses palpated.  EXTREMITIES: Normal muscle tone and joint movement, clubbed nails  LYMPHATICS: No adenopathy palpated, 1+ edema.  SKIN: Dry, intact, no lesions.   NEURO: Cranial nerves II-XII intact. Motor strength 5/5 bilaterally, upper and lower extremities.  PSYCH: Appropriate affect.    Assessment:       1. Asthma with acute exacerbation, unspecified asthma severity, unspecified whether persistent    2. DANE on CPAP    3. Hypoxemia          Plan:         Prednisone taper, short  Continue Qvar  CXR  RTC in 6 weeks  Call me if the cough doesn't improve

## 2019-07-22 NOTE — PATIENT INSTRUCTIONS
"  Asthma (Adult)  Asthma is a disease where the medium and  small air passages within the lung go into spasm and restrict the flow of air. Inflammation and swelling of the airways cause further restriction. During an acute asthma attack, these factors cause difficulty breathing, wheezing, cough and chest tightness.    An asthma attack can be triggered by many things. Common triggers include infections such as the common cold, bronchitis, pneumonia. Irritants such as smoke or pollutants in the air, emotional upset, and exercise can also trigger an attack. In many adults with asthma, allergies to dust, mold, pollen and animal dander can cause an asthma attack. Skipping doses of daily asthma medicine can also bring on an asthma attack.  Asthma can be controlled using the proper medicines prescribed by your healthcare provider and avoiding exposure to known triggers including allergens and irritants.  Home care  · Take prescribed medicine exactly at the times advised. If you need medicine such as from a hand held inhaler or aerosol breathing machine more than every 4 hours, contact your healthcare provider or seek immediate medical attention. If prescribed an antibiotic or prednisone, take all of the medicine as prescribed, even if you are feeling better after a few days.  · Do not smoke. Avoid being exposed to the smoke of others.  · Some people with asthma have worsening of their symptoms when they take aspirin and non-steroidal or fever-reducing medicines like ibuprofen and naproxen. Talk to your healthcare provider if you think this may apply to you.  Follow-up care  Follow up with your healthcare provider, or as advised. Always bring all of your current medicines to any appointments with your healthcare provider. Also bring a complete list of medications even those not taken for asthma. If you do not already have one, talk to your healthcare provider about developing a personalized "Asthma Action Plan."  A " pneumococcal (pneumonia) vaccine and yearly flu shot (every fall) are recommended. Ask your doctor about this.  When to seek medical advice  Call your healthcare provider right away if any of these occur:   · Increased wheezing or shortness of breath  · Need to use your inhalers more often than usual without relief  · Fever of 100.4ºF (38ºC) or higher, or as directed by your healthcare provider  · Coughing up lots of dark-colored or bloody sputum (mucus)  · Chest pain with each breath  · If you use a peak flow meter as part of an Asthma Action Plan, and you are still in the yellow zone (50% to 80%) 15 minutes after using inhaler medicine.  Call 911  Call 911 if any of the following occur  · Trouble walking or talking because of shortness of breath  · If you use a peak flow meter as part of an Asthma Action Plan and you are still in the red zone (less than 50%) 15 minutes after using inhaler medicine  · Lips or fingernails turning gray or blue  Date Last Reviewed: 12/2/2015  © 7852-7275 [employer]. [address] [place] [address], [place] [address]. All rights reserved. This information is not intended as a substitute for professional medical care. Always follow your healthcare professional's instructions.      Prednisone taper, short  Continue Qvar  CXR  RTC in 6 weeks

## 2019-09-03 ENCOUNTER — OFFICE VISIT (OUTPATIENT)
Dept: PULMONOLOGY | Facility: CLINIC | Age: 74
End: 2019-09-03
Payer: MEDICARE

## 2019-09-03 VITALS
SYSTOLIC BLOOD PRESSURE: 120 MMHG | DIASTOLIC BLOOD PRESSURE: 70 MMHG | BODY MASS INDEX: 40.05 KG/M2 | OXYGEN SATURATION: 93 % | HEART RATE: 62 BPM | WEIGHT: 204 LBS | HEIGHT: 60 IN

## 2019-09-03 DIAGNOSIS — J45.40 MODERATE PERSISTENT ASTHMA WITHOUT COMPLICATION: Primary | ICD-10-CM

## 2019-09-03 DIAGNOSIS — G47.33 OSA (OBSTRUCTIVE SLEEP APNEA): ICD-10-CM

## 2019-09-03 DIAGNOSIS — M05.79 RHEUMATOID ARTHRITIS INVOLVING MULTIPLE SITES WITH POSITIVE RHEUMATOID FACTOR: ICD-10-CM

## 2019-09-03 PROCEDURE — 99214 PR OFFICE/OUTPT VISIT, EST, LEVL IV, 30-39 MIN: ICD-10-PCS | Mod: S$GLB,,, | Performed by: NURSE PRACTITIONER

## 2019-09-03 PROCEDURE — 99214 OFFICE O/P EST MOD 30 MIN: CPT | Mod: S$GLB,,, | Performed by: NURSE PRACTITIONER

## 2019-09-03 NOTE — PATIENT INSTRUCTIONS
Understanding Asthma Triggers  Triggers are things that cause you to have asthma symptoms. Some triggers you can stay away from completely. Others you can plan for and adjust to. Use this sheet to help you know your triggers.    What are triggers?  Triggers irritate your lungs and lead to asthma flare-ups. Some examples are:  · Irritants, such as tobacco smoke or air pollution. These are a concern for all people with asthma.  · Allergens or substances that cause allergies, like pets, dust mites, or pollen  · Special conditions. These include being ill with a cold or the flu, or certain kinds of weather, including changes in weather. These differ from person to person.  · Exercise can trigger asthma in some people. If exercise is one of your triggers, you can learn how to exercise safely.  What triggers your asthma?  Which of these common triggers cause your asthma to flare up? Check all that apply to you.  Irritants:  ? Tobacco smoke (smoking or secondhand smoke)  ? Smoke from fireplaces  ? Vehicle exhaust  ? Smog or air pollution  ? Aerosol sprays  ? Strong odors, such as perfume, incense, or cooking odors  ? Household , such as ammonia or bleach  Allergens:  ? Cats  ? Dogs  ? Birds  ? Dust or dust mites  ? Pollen  ? Mold  ? Cockroaches  Other triggers:  ? Cold air  ? Hot air  ? Weather changes  ? Exercise  ? Certain foods or food ingredients (such as sulfites)  ? Medicines  ? Emotions such as laughing, crying, or feeling stressed  ? Illness such as colds, flu, and sinus infections  Allergies and allergy treatment  People with asthma often have allergies. If you have allergies, or think you have them, talk with your healthcare provider about testing and treatment. Allergy testing can find out exactly which allergens affect you. Types of tests include:  · Skin tests. A small amount of each allergen is put on the skin. Sites are then looked at for an allergic reaction. This could be redness, swelling, or  itching. In general, the greater the reaction, the stronger the allergy.  · Blood tests. A blood test can show sensitivity to the allergen.  Exposing a person to gradually larger amounts of an allergen can help the body build up a tolerance. This is the purpose of allergy shots (immunotherapy). For this therapy, injections are given over a period of years. At first, you get injections with a very small amount of allergen about once a week. As treatment goes on, the amount of allergen is gradually increased to a certain level. Eventually, you have the injections less often. This therapy can take up to a year to start working. But it can be very effective to manage certain allergies over time.   Date Last Reviewed: 10/1/2016  © 0219-7661 [employer]. [address] [place] [address], [place] [address]. All rights reserved. This information is not intended as a substitute for professional medical care. Always follow your healthcare professional's instructions.      Continue the Qvar  Keep sleeping on your CPAP with oxygen bled in   Call If you get sick  Follow up in 6 months

## 2019-09-03 NOTE — PROGRESS NOTES
SUBJECTIVE:    Patient ID: Ling Rapp is a 73 y.o. female.    Chief Complaint: Sleep Apnea (6 mo follow up )      Patient here today feeling well.  She is no longer coughing. She is using Qvar.  Her peak flow is 450..  She is doing well with her CPAP.  She is sleeping on it every night.  She gets good results from her CPAP.  She is bleeding in her oxygen to the CPAP.    Past Medical History:   Diagnosis Date    ALLERGIC RHINITIS     Anticoagulant long-term use     Asthma     Cataract     Former smoker     GERD (gastroesophageal reflux disease)     Hemorrhoids     Hypertension     DANE (obstructive sleep apnea)     Osteopenia     Pancreatitis     secondary to severe hypertriglyceridemia    Peptic ulcer     Rheumatoid arthritis     Vitamin D deficiency      Past Surgical History:   Procedure Laterality Date    Athroscopy(knee)      BREAST BIOPSY Left 2017    benign    CARDIAC CATHETERIZATION      CATARACT EXTRACTION Left 2013    COLONOSCOPY N/A 3/30/2016    [facility]O    EGD (ESOPHAGOGASTRODUODENOSCOPY) N/A 5/21/2013    [facility] ENDO    EYE SURGERY      cataracts bilateral     FRACTURE SURGERY  1997    right knee     HYSTERECTOMY  1982    Injection,steroid,epidural,transforaminal approach Right 4/15/2019    [facility]    PARTIAL HYSTERECTOMY      w/ USO    TONSILLECTOMY      UPPER GASTROINTESTINAL ENDOSCOPY  5/2013     Family History   Problem Relation Age of Onset    Colon polyps Sister 64    Ulcers Father     Emphysema Father         smoker    Alcohol abuse Father     Thyroid disease Mother     Heart disease Mother     Emphysema Mother         smoker    Alzheimer's disease Brother     Stroke Brother     Cancer Brother     Throat cancer Brother         non smoker    No Known Problems Son     Diabetes Maternal Uncle         1/2 brother    Cancer Paternal Aunt      Early death Paternal Aunt     Leukemia Paternal Aunt         highschool    Breast cancer Paternal Aunt     Heart disease Paternal Uncle     Rheum arthritis Maternal Grandmother     Pneumonia Maternal Grandfather     Cancer Paternal Grandmother     Breast cancer Paternal Grandmother     Early death Paternal Grandfather          killed in line of duty        Social History:   Marital Status:   Occupation:retired    Alcohol History:  reports that she does not drink alcohol.  Tobacco History:  reports that she quit smoking about 47 years ago. Her smoking use included cigarettes. She has a 10.00 pack-year smoking history. She has never used smokeless tobacco.  Drug History:  reports that she does not use drugs.    Review of patient's allergies indicates:   Allergen Reactions    Amoxicillin-pot clavulanate Diarrhea and Other (See Comments)     Sever cramping    Diltiazem Other (See Comments)     Extreme dry moth    Fenofibrate Other (See Comments)     Other reaction(s): Itching    Hydroxychloroquine      Other reaction(s): eye accomodation problems    Leflunomide      Other reaction(s): abd pains    Lisinopril-hydrochlorothiazide      Other reaction(s): diarrhea  Other reaction(s): cramps    Methotrexate      Other reaction(s): pancreatitis    Norvasc [amlodipine] Swelling    Sulfa (sulfonamide antibiotics)      Other reaction(s): Swelling  Other reaction(s): Hives    Sulfamethoxazole-trimethoprim        Current Outpatient Medications   Medication Sig Dispense Refill    albuterol (ACCUNEB) 1.25 mg/3 mL Nebu Take 3 mLs (1.25 mg total) by nebulization every 6 (six) hours as needed (shortness of breath, or wheezing). Rescue 1 Box 3    albuterol (PROAIR HFA) 90 mcg/actuation inhaler Inhale 2 puffs into the lungs every 6 (six) hours as needed for Wheezing. 3 Inhaler 5    artificial saliva, yerbas-lyt, SprA Use as directed 240 mL 11    aspirin 81 mg Tab Take 81 mg by mouth once daily.  Every day      atenolol (TENORMIN) 100 MG tablet TAKE 1 TABLET DAILY 90 tablet 2    azelastine (ASTELIN) 137 mcg (0.1 %) nasal spray 1 spray by Nasal route 2 (two) times daily.      beclomethasone (QVAR) 80 mcg/actuation Aero Inhale 1 puff into the lungs 2 (two) times daily. Controller      benzonatate (TESSALON) 200 MG capsule Take 200 mg by mouth 3 (three) times daily as needed for Cough.      CALCIUM CARBONATE/VITAMIN D3 (OS-ADDY 500 + D) 500-125 mg-unit Tab Take 1 tablet by mouth once daily. Twice a day      cetirizine (ZYRTEC) 10 MG tablet Take 10 mg by mouth once daily. Every day      cholecalciferol, vitamin D3, 5,000 unit capsule Take 5,000 Units by mouth once daily. 5 a week      cyanocobalamin (VITAMIN B-12) 100 MCG tablet Take 100 mcg by mouth once daily.      diclofenac sodium (VOLTAREN) 1 % Gel Apply 2 g topically once daily.      etanercept (ENBREL) 50 mg/mL (0.98 mL) injection 50 mg once a week. once weekly      fluticasone (FLONASE) 50 mcg/actuation nasal spray 2 sprays by Each Nare route once daily. Every day 48 g 3    magnesium oxide (MAG-OX) 400 mg tablet Take 400 mg by mouth once daily.      montelukast (SINGULAIR) 10 mg tablet TAKE 1 TABLET EVERY EVENING 90 tablet 3    omeprazole (PRILOSEC) 40 MG capsule TAKE 1 CAPSULE TWICE DAILY BEFORE MEALS 180 capsule 2    UBIDECARENONE (COQ-10 ORAL) Take 1 capsule by mouth once daily.        No current facility-administered medications for this visit.        Last PFT: 09/17/2018  Last CT: 09/06/2018      General: Feeling Well.  Eyes: Vision is good.  ENT:  Congestion better   Heart:: No chest pain or palpitations.  Lungs: coughing is gone for the most part, will cough a little in the am  GI: reflux controlled with Prilosec  : No dysuria, hesitancy, or nocturia.  Musculoskeletal: RA  Skin: No lesions or rashes.  Neuro: No headaches or neuropathy.  Lymph: No edema or adenopathy.  Psych: No anxiety or depression.  Endo: weight down a few  pounds    OBJECTIVE:      /70 (BP Location: Left arm, Patient Position: Sitting)   Pulse 62   Ht 5' (1.524 m)   Wt 92.5 kg (204 lb)   SpO2 (!) 93% Comment: 2.0 oxygen level   L/min   BMI 39.84 kg/m²     Physical Exam  GENERAL: Older patient in no distress.  HEENT: Pupils equal and reactive. Extraocular movements intact. Nose intact.      Pharynx moist.  NECK: Supple.   HEART: Regular rate and rhythm. No murmur or gallop auscultated.  LUNGS: fine crackles to bases  ABDOMEN: Bowel sounds present. Non-tender, no masses palpated.  EXTREMITIES: Normal muscle tone and joint movement, clubbed nails  LYMPHATICS: No adenopathy palpated, 1+ edema.  SKIN: Dry, intact, no lesions.   NEURO: Cranial nerves II-XII intact. Motor strength 5/5 bilaterally, upper and lower extremities.  PSYCH: Appropriate affect.    Assessment:       1. Moderate persistent asthma without complication    2. DANE (obstructive sleep apnea)    3. Rheumatoid arthritis involving multiple sites with positive rheumatoid factor          Plan:           Continue the Qvar  Keep sleeping on your CPAP with oxygen bled in   Call If you get sick  PFTs before you next visit  Follow up in 6 months

## 2019-09-05 ENCOUNTER — OFFICE VISIT (OUTPATIENT)
Dept: RHEUMATOLOGY | Facility: CLINIC | Age: 74
End: 2019-09-05
Payer: MEDICARE

## 2019-09-05 ENCOUNTER — LAB VISIT (OUTPATIENT)
Dept: LAB | Facility: HOSPITAL | Age: 74
End: 2019-09-05
Attending: INTERNAL MEDICINE
Payer: MEDICARE

## 2019-09-05 VITALS — BODY MASS INDEX: 40 KG/M2 | SYSTOLIC BLOOD PRESSURE: 139 MMHG | WEIGHT: 204.81 LBS | DIASTOLIC BLOOD PRESSURE: 76 MMHG

## 2019-09-05 DIAGNOSIS — M06.9 ATROPHIC ARTHRITIS: Primary | ICD-10-CM

## 2019-09-05 DIAGNOSIS — M06.9 RHEUMATOID ARTHRITIS, INVOLVING UNSPECIFIED SITE, UNSPECIFIED RHEUMATOID FACTOR PRESENCE: Primary | ICD-10-CM

## 2019-09-05 LAB
ALBUMIN SERPL BCP-MCNC: 3.6 G/DL (ref 3.5–5.2)
ALP SERPL-CCNC: 80 U/L (ref 55–135)
ALT SERPL W/O P-5'-P-CCNC: 15 U/L (ref 10–44)
ANION GAP SERPL CALC-SCNC: 8 MMOL/L (ref 8–16)
AST SERPL-CCNC: 17 U/L (ref 10–40)
BASOPHILS # BLD AUTO: 0.05 K/UL (ref 0–0.2)
BASOPHILS NFR BLD: 0.7 % (ref 0–1.9)
BILIRUB SERPL-MCNC: 0.7 MG/DL (ref 0.1–1)
BUN SERPL-MCNC: 14 MG/DL (ref 8–23)
CALCIUM SERPL-MCNC: 8.9 MG/DL (ref 8.7–10.5)
CHLORIDE SERPL-SCNC: 107 MMOL/L (ref 95–110)
CO2 SERPL-SCNC: 26 MMOL/L (ref 23–29)
CREAT SERPL-MCNC: 0.7 MG/DL (ref 0.5–1.4)
CRP SERPL-MCNC: 1 MG/L (ref 0–8.2)
DIFFERENTIAL METHOD: ABNORMAL
EOSINOPHIL # BLD AUTO: 0.3 K/UL (ref 0–0.5)
EOSINOPHIL NFR BLD: 3.7 % (ref 0–8)
ERYTHROCYTE [DISTWIDTH] IN BLOOD BY AUTOMATED COUNT: 12.9 % (ref 11.5–14.5)
EST. GFR  (AFRICAN AMERICAN): >60 ML/MIN/1.73 M^2
EST. GFR  (NON AFRICAN AMERICAN): >60 ML/MIN/1.73 M^2
GLUCOSE SERPL-MCNC: 92 MG/DL (ref 70–110)
HCT VFR BLD AUTO: 44 % (ref 37–48.5)
HGB BLD-MCNC: 13.6 G/DL (ref 12–16)
IMM GRANULOCYTES # BLD AUTO: 0.03 K/UL (ref 0–0.04)
IMM GRANULOCYTES NFR BLD AUTO: 0.4 % (ref 0–0.5)
LYMPHOCYTES # BLD AUTO: 2.8 K/UL (ref 1–4.8)
LYMPHOCYTES NFR BLD: 36 % (ref 18–48)
MCH RBC QN AUTO: 30 PG (ref 27–31)
MCHC RBC AUTO-ENTMCNC: 30.9 G/DL (ref 32–36)
MCV RBC AUTO: 97 FL (ref 82–98)
MONOCYTES # BLD AUTO: 0.9 K/UL (ref 0.3–1)
MONOCYTES NFR BLD: 11.3 % (ref 4–15)
NEUTROPHILS # BLD AUTO: 3.7 K/UL (ref 1.8–7.7)
NEUTROPHILS NFR BLD: 47.9 % (ref 38–73)
NRBC BLD-RTO: 0 /100 WBC
PLATELET # BLD AUTO: 156 K/UL (ref 150–350)
PMV BLD AUTO: 11.2 FL (ref 9.2–12.9)
POTASSIUM SERPL-SCNC: 4.1 MMOL/L (ref 3.5–5.1)
PROT SERPL-MCNC: 7.4 G/DL (ref 6–8.4)
RBC # BLD AUTO: 4.53 M/UL (ref 4–5.4)
SODIUM SERPL-SCNC: 141 MMOL/L (ref 136–145)
WBC # BLD AUTO: 7.64 K/UL (ref 3.9–12.7)

## 2019-09-05 PROCEDURE — 99213 PR OFFICE/OUTPT VISIT, EST, LEVL III, 20-29 MIN: ICD-10-PCS | Mod: S$GLB,,, | Performed by: INTERNAL MEDICINE

## 2019-09-05 PROCEDURE — 86140 C-REACTIVE PROTEIN: CPT

## 2019-09-05 PROCEDURE — 99213 OFFICE O/P EST LOW 20 MIN: CPT | Mod: S$GLB,,, | Performed by: INTERNAL MEDICINE

## 2019-09-05 PROCEDURE — 80053 COMPREHEN METABOLIC PANEL: CPT

## 2019-09-05 PROCEDURE — 36415 COLL VENOUS BLD VENIPUNCTURE: CPT | Mod: PO

## 2019-09-05 PROCEDURE — 85025 COMPLETE CBC W/AUTO DIFF WBC: CPT

## 2019-09-05 NOTE — PROGRESS NOTES
[facility] RHEUMATOLOGY            PROGRESS NOTE      Subjective:       Patient ID:   NAME: Ling Rapp : 1945     73 y.o. female    Referring Doc: No ref. provider found  Other Physicians:    Chief Complaint:  Rheumatoid Arthritis      History of Present Illness:     Patient returns today for a regularly scheduled follow-up visit for    RA   The patient is doing well. No prolonged morning stiffness significant arthralgias or joint swelling.No cough , fevers or shortness of breath.no GI complaints.        ROS:   GEN:  No  fever, night sweats . weight is stable   + sl fatigue  SKIN: no rashes, no bruising, no ulcerations, no Raynaud's  HEENT: no HA's, No visual changes, no mucosal ulcers, no sicca symptoms,  CV:   no CP, SOB, PND, SEN, no orthopnea, no palpitations  PULM: normal with no SOB, cough, hemoptysis, sputum or pleuritic pain  GI:  no abdominal pain, nausea, vomiting, constipation, diarrhea, melanotic stools, BRBPR, hematemesis, no dysphagia  :   no dysuria  NEURO: no paresthesias, headaches, visual disturbances, muscle weakness  MUSCULOSKELETAL:no joint swelling, prolonged AM stiffness, no back pain, no muscle pain  Allergies:  Review of patient's allergies indicates:   Allergen Reactions    Amoxicillin-pot clavulanate Diarrhea and Other (See Comments)     Sever cramping    Diltiazem Other (See Comments)     Extreme dry moth    Fenofibrate Other (See Comments)     Other reaction(s): Itching    Hydroxychloroquine      Other reaction(s): eye accomodation problems    Leflunomide      Other reaction(s): abd pains    Lisinopril-hydrochlorothiazide      Other reaction(s): diarrhea  Other reaction(s): cramps    Methotrexate      Other reaction(s): pancreatitis    Norvasc [amlodipine] Swelling    Sulfa (sulfonamide antibiotics)      Other reaction(s): Swelling  Other reaction(s): Hives    Sulfamethoxazole-trimethoprim        Medications:    Current Outpatient Medications:      albuterol (ACCUNEB) 1.25 mg/3 mL Nebu, Take 3 mLs (1.25 mg total) by nebulization every 6 (six) hours as needed (shortness of breath, or wheezing). Rescue, Disp: 1 Box, Rfl: 3    albuterol (PROAIR HFA) 90 mcg/actuation inhaler, Inhale 2 puffs into the lungs every 6 (six) hours as needed for Wheezing., Disp: 3 Inhaler, Rfl: 5    artificial saliva, yerbas-lyt, SprA, Use as directed, Disp: 240 mL, Rfl: 11    aspirin 81 mg Tab, Take 81 mg by mouth once daily. Every day, Disp: , Rfl:     atenolol (TENORMIN) 100 MG tablet, TAKE 1 TABLET DAILY, Disp: 90 tablet, Rfl: 2    azelastine (ASTELIN) 137 mcg (0.1 %) nasal spray, 1 spray by Nasal route 2 (two) times daily., Disp: , Rfl:     beclomethasone (QVAR) 80 mcg/actuation Aero, Inhale 1 puff into the lungs 2 (two) times daily. Controller, Disp: , Rfl:     benzonatate (TESSALON) 200 MG capsule, Take 200 mg by mouth 3 (three) times daily as needed for Cough., Disp: , Rfl:     CALCIUM CARBONATE/VITAMIN D3 (OS-ADDY 500 + D) 500-125 mg-unit Tab, Take 1 tablet by mouth once daily. Twice a day, Disp: , Rfl:     cetirizine (ZYRTEC) 10 MG tablet, Take 10 mg by mouth once daily. Every day, Disp: , Rfl:     cholecalciferol, vitamin D3, 5,000 unit capsule, Take 5,000 Units by mouth once daily. 5 a week, Disp: , Rfl:     cyanocobalamin (VITAMIN B-12) 100 MCG tablet, Take 100 mcg by mouth once daily., Disp: , Rfl:     diclofenac sodium (VOLTAREN) 1 % Gel, Apply 2 g topically once daily., Disp: , Rfl:     etanercept (ENBREL) 50 mg/mL (0.98 mL) injection, 50 mg once a week. once weekly, Disp: , Rfl:     fluticasone (FLONASE) 50 mcg/actuation nasal spray, 2 sprays by Each Nare route once daily. Every day, Disp: 48 g, Rfl: 3    magnesium oxide (MAG-OX) 400 mg tablet, Take 400 mg by mouth once daily., Disp: , Rfl:     montelukast (SINGULAIR) 10 mg tablet, TAKE 1 TABLET EVERY EVENING, Disp: 90 tablet, Rfl: 3    omeprazole (PRILOSEC) 40 MG capsule, TAKE 1 CAPSULE TWICE DAILY  BEFORE MEALS, Disp: 180 capsule, Rfl: 2    UBIDECARENONE (COQ-10 ORAL), Take 1 capsule by mouth once daily. , Disp: , Rfl:     PMHx/PSHx Updates:          Objective:     Vitals:  Blood pressure 139/76, weight 92.9 kg (204 lb 12.8 oz).    Physical Examination:   GEN: no apparent distress, comfortable; AAOx3  SKIN: no rashes,no ulceration, no Raynaud's, no petechiae, no SQ nodules,  HEAD: normal  EYES: no pallor, no icterus  NECK: no masses, thyroid normal, trachea midline, no LAD/LN's, supple  CV: RRR with no murmur; l S1 and S2 reg. ,no gallop no rubs,   CHEST: Normal respiratory effort; CTAB; normal breath sounds; no wheeze or crackles  MUSC/Skeletal: ROM normal; no crepitus; joints without synovitis,  + Heberden's deformities  No joint swelling or tenderness of PIP, MCP, wrist, elbow, shoulder, or knee joints  EXTREM: no clubbing, cyanosis, no edema,normal  pulses   NEURO: grossly intact; motor WNL; AAOx3; ,  PSYCH: normal mood, affect and behavior  LYMPH: normal cervical, supraclavicular          Labs:   Lab Results   Component Value Date    WBC 7.87 05/10/2019    HGB 14.4 05/10/2019    HCT 46.4 05/10/2019    MCV 96 05/10/2019     05/10/2019    CMP  @LASTLAB(NA,K,CL,CO2,GLU,BUN,Creatinine,Calcium,PROT,Albumin,Bilitot,Alkphos,AST,ALT,CRP,ESR,RF,CCP,VANNA,SSA,CPK,uric acid) )@  I have reviewed all available lab results and radiology reports.    Radiology/Diagnostic Studies:        Assessment/Plan:   (1) 73 y.o. female with diagnosis of RA...stable on Enbrel  Labs  FU in 3 months                Discussion:     I have explained all of the above in detail and the patient understands all of the current recommendation(s). I have answered all questions to the best of my ability and to their complete satisfaction.       The patient is to continue with the current management plan         RTC in         Electronically signed by Manjinder Schultz MD

## 2019-09-30 ENCOUNTER — TELEPHONE (OUTPATIENT)
Dept: FAMILY MEDICINE | Facility: CLINIC | Age: 74
End: 2019-09-30

## 2019-09-30 DIAGNOSIS — Z12.31 SCREENING MAMMOGRAM, ENCOUNTER FOR: Primary | ICD-10-CM

## 2019-09-30 NOTE — TELEPHONE ENCOUNTER
----- Message from Kassidy Atkinson sent at 9/30/2019  1:18 PM CDT -----  Type: Needs Medical Advice    Who Called:  patient  Symptoms (please be specific):  na  How long has patient had these symptoms:  mar  Pharmacy name and phone #:  mar  Best Call Back Number: [phone]  Additional Information: patient received letter to schedule mammogram/please enter order/Thank You

## 2019-10-10 ENCOUNTER — IMMUNIZATION (OUTPATIENT)
Dept: FAMILY MEDICINE | Facility: CLINIC | Age: 74
End: 2019-10-10
Payer: MEDICARE

## 2019-10-10 ENCOUNTER — HOSPITAL ENCOUNTER (OUTPATIENT)
Dept: RADIOLOGY | Facility: CLINIC | Age: 74
Discharge: HOME | End: 2019-10-10
Attending: FAMILY MEDICINE
Payer: MEDICARE

## 2019-10-10 DIAGNOSIS — Z12.31 SCREENING MAMMOGRAM, ENCOUNTER FOR: ICD-10-CM

## 2019-10-10 PROCEDURE — 99211 OFF/OP EST MAY X REQ PHY/QHP: CPT | Mod: PBBFAC,PO,25

## 2019-10-10 PROCEDURE — 77063 MAMMO DIGITAL SCREENING BILAT WITH TOMOSYNTHESIS_CAD: ICD-10-PCS | Mod: 26,,, | Performed by: RADIOLOGY

## 2019-10-10 PROCEDURE — 77067 SCR MAMMO BI INCL CAD: CPT | Mod: TC,PO

## 2019-10-10 PROCEDURE — 90662 IIV NO PRSV INCREASED AG IM: CPT | Mod: PBBFAC,PO

## 2019-10-10 PROCEDURE — 99999 PR PBB SHADOW E&M-EST. PATIENT-LVL I: CPT | Mod: PBBFAC,,,

## 2019-10-10 PROCEDURE — 77067 MAMMO DIGITAL SCREENING BILAT WITH TOMOSYNTHESIS_CAD: ICD-10-PCS | Mod: 26,,, | Performed by: RADIOLOGY

## 2019-10-10 PROCEDURE — 77067 SCR MAMMO BI INCL CAD: CPT | Mod: 26,,, | Performed by: RADIOLOGY

## 2019-10-10 PROCEDURE — 77063 BREAST TOMOSYNTHESIS BI: CPT | Mod: 26,,, | Performed by: RADIOLOGY

## 2019-10-10 PROCEDURE — 99999 PR PBB SHADOW E&M-EST. PATIENT-LVL I: ICD-10-PCS | Mod: PBBFAC,,,

## 2019-12-02 DIAGNOSIS — J45.40 MODERATE PERSISTENT ASTHMA: Primary | ICD-10-CM

## 2019-12-06 ENCOUNTER — HOSPITAL ENCOUNTER (OUTPATIENT)
Dept: RADIOLOGY | Facility: HOSPITAL | Age: 74
Discharge: HOME | End: 2019-12-06
Attending: NURSE PRACTITIONER
Payer: MEDICARE

## 2019-12-06 ENCOUNTER — TELEPHONE (OUTPATIENT)
Dept: PULMONOLOGY | Facility: CLINIC | Age: 74
End: 2019-12-06

## 2019-12-06 ENCOUNTER — OFFICE VISIT (OUTPATIENT)
Dept: PULMONOLOGY | Facility: CLINIC | Age: 74
End: 2019-12-06
Payer: MEDICARE

## 2019-12-06 VITALS
WEIGHT: 207 LBS | DIASTOLIC BLOOD PRESSURE: 80 MMHG | HEART RATE: 66 BPM | TEMPERATURE: 100 F | OXYGEN SATURATION: 97 % | SYSTOLIC BLOOD PRESSURE: 160 MMHG | BODY MASS INDEX: 40.64 KG/M2 | HEIGHT: 60 IN

## 2019-12-06 DIAGNOSIS — R05.9 COUGH: ICD-10-CM

## 2019-12-06 DIAGNOSIS — R05.9 COUGH: Primary | ICD-10-CM

## 2019-12-06 DIAGNOSIS — J45.31 MILD PERSISTENT ASTHMA WITH EXACERBATION: ICD-10-CM

## 2019-12-06 DIAGNOSIS — J06.9 VIRAL UPPER RESPIRATORY TRACT INFECTION: ICD-10-CM

## 2019-12-06 PROCEDURE — 71046 X-RAY EXAM CHEST 2 VIEWS: CPT | Mod: TC

## 2019-12-06 PROCEDURE — 1159F PR MEDICATION LIST DOCUMENTED IN MEDICAL RECORD: ICD-10-PCS | Mod: S$GLB,,, | Performed by: NURSE PRACTITIONER

## 2019-12-06 PROCEDURE — 99214 OFFICE O/P EST MOD 30 MIN: CPT | Mod: S$GLB,,, | Performed by: NURSE PRACTITIONER

## 2019-12-06 PROCEDURE — 1126F PR PAIN SEVERITY QUANTIFIED, NO PAIN PRESENT: ICD-10-PCS | Mod: S$GLB,,, | Performed by: NURSE PRACTITIONER

## 2019-12-06 PROCEDURE — 99214 PR OFFICE/OUTPT VISIT, EST, LEVL IV, 30-39 MIN: ICD-10-PCS | Mod: S$GLB,,, | Performed by: NURSE PRACTITIONER

## 2019-12-06 PROCEDURE — 1159F MED LIST DOCD IN RCRD: CPT | Mod: S$GLB,,, | Performed by: NURSE PRACTITIONER

## 2019-12-06 PROCEDURE — 1126F AMNT PAIN NOTED NONE PRSNT: CPT | Mod: S$GLB,,, | Performed by: NURSE PRACTITIONER

## 2019-12-06 RX ORDER — PREDNISONE 10 MG/1
TABLET ORAL
Qty: 20 TABLET | Refills: 0 | Status: SHIPPED | OUTPATIENT
Start: 2019-12-06 | End: 2020-11-04

## 2019-12-06 NOTE — PROGRESS NOTES
SUBJECTIVE:    Patient ID: Ling Rapp is a 74 y.o. female.    Chief Complaint: Cough      Patient here today not feeling well.    Her peak flow is 300 here today was 450 at her last visit. She has been coughing all day since yesterday with increased dyspnea.  She is only using 1 puff of Qvar twice a day.  She is doing well with her CPAP.   She is bleeding in her oxygen to the CPAP.    Past Medical History:   Diagnosis Date    ALLERGIC RHINITIS     Anticoagulant long-term use     Asthma     Cataract     Former smoker     GERD (gastroesophageal reflux disease)     Hemorrhoids     Hypertension     DANE (obstructive sleep apnea)     Osteopenia     Pancreatitis     secondary to severe hypertriglyceridemia    Peptic ulcer     Rheumatoid arthritis     Vitamin D deficiency      Past Surgical History:   Procedure Laterality Date    Athroscopy(knee)      BREAST BIOPSY Left 2017    benign    CARDIAC CATHETERIZATION      CATARACT EXTRACTION Left 2013    COLONOSCOPY N/A 3/30/2016    Procedure: COLONOSCOPY;  Surgeon: Thee Sorenson MD;  [facility]: [facility];  Service: Endoscopy;  Laterality: N/A;    EYE SURGERY      cataracts bilateral     FRACTURE SURGERY  1997    right knee     HYSTERECTOMY  1982    PARTIAL HYSTERECTOMY      w/ USO    TONSILLECTOMY      TRANSFORAMINAL EPIDURAL INJECTION OF STEROID Right 4/15/2019    Procedure: Injection,steroid,epidural,transforaminal approach;  Surgeon: Fernando Jenkins MD;  Location: [facility] OR;  Service: Pain Management;  Laterality: Right;  L4-5, L5-S1    UPPER GASTROINTESTINAL ENDOSCOPY  5/2013     Family History   Problem Relation Age of Onset    Colon polyps Sister 64    Ulcers Father     Emphysema Father         smoker    Alcohol abuse Father     Thyroid disease Mother     Heart disease Mother     Emphysema Mother         smoker    Alzheimer's disease Brother     Stroke Brother     Cancer Brother     Throat cancer Brother         non smoker     No Known Problems Son     Diabetes Maternal Uncle         1/2 brother    Cancer Paternal Aunt     Early death Paternal Aunt     Leukemia Paternal Aunt         highschool    Breast cancer Paternal Aunt     Heart disease Paternal Uncle     Rheum arthritis Maternal Grandmother     Pneumonia Maternal Grandfather     Cancer Paternal Grandmother     Breast cancer Paternal Grandmother     Early death Paternal Grandfather          killed in line of duty        Social History:   Marital Status:   Occupation:retired    Alcohol History:  reports that she does not drink alcohol.  Tobacco History:  reports that she quit smoking about 47 years ago. Her smoking use included cigarettes. She has a 10.00 pack-year smoking history. She has never used smokeless tobacco.  Drug History:  reports that she does not use drugs.    Review of patient's allergies indicates:   Allergen Reactions    Amoxicillin-pot clavulanate Diarrhea and Other (See Comments)     Sever cramping    Diltiazem Other (See Comments)     Extreme dry moth    Fenofibrate Other (See Comments)     Other reaction(s): Itching    Hydroxychloroquine      Other reaction(s): eye accomodation problems    Leflunomide      Other reaction(s): abd pains    Lisinopril-hydrochlorothiazide      Other reaction(s): diarrhea  Other reaction(s): cramps    Methotrexate      Other reaction(s): pancreatitis    Norvasc [amlodipine] Swelling    Sulfa (sulfonamide antibiotics)      Other reaction(s): Swelling  Other reaction(s): Hives    Sulfamethoxazole-trimethoprim        Current Outpatient Medications   Medication Sig Dispense Refill    albuterol (PROAIR HFA) 90 mcg/actuation inhaler Inhale 2 puffs into the lungs every 6 (six) hours as needed for Wheezing. 3 Inhaler 5    artificial saliva, yerbas-lyt, SprA Use as directed 240 mL 11    aspirin 81 mg Tab Take 81 mg by mouth once daily. Every day      atenolol (TENORMIN) 100 MG tablet TAKE 1 TABLET  DAILY 90 tablet 2    azelastine (ASTELIN) 137 mcg (0.1 %) nasal spray 1 spray by Nasal route 2 (two) times daily.      beclomethasone (QVAR) 80 mcg/actuation Aero Inhale 1 puff into the lungs 2 (two) times daily. Controller      benzonatate (TESSALON) 200 MG capsule Take 200 mg by mouth 3 (three) times daily as needed for Cough.      CALCIUM CARBONATE/VITAMIN D3 (OS-ADDY 500 + D) 500-125 mg-unit Tab Take 1 tablet by mouth once daily. Twice a day      cetirizine (ZYRTEC) 10 MG tablet Take 10 mg by mouth once daily. Every day      cholecalciferol, vitamin D3, 5,000 unit capsule Take 5,000 Units by mouth once daily. 5 a week      cyanocobalamin (VITAMIN B-12) 100 MCG tablet Take 100 mcg by mouth once daily.      diclofenac sodium (VOLTAREN) 1 % Gel Apply 2 g topically once daily.      etanercept (ENBREL) 50 mg/mL (0.98 mL) injection 50 mg once a week. once weekly      fluticasone (FLONASE) 50 mcg/actuation nasal spray 2 sprays by Each Nare route once daily. Every day 48 g 3    magnesium oxide (MAG-OX) 400 mg tablet Take 400 mg by mouth once daily.      montelukast (SINGULAIR) 10 mg tablet TAKE 1 TABLET EVERY EVENING 90 tablet 3    omeprazole (PRILOSEC) 40 MG capsule TAKE 1 CAPSULE TWICE DAILY BEFORE MEALS 180 capsule 2    UBIDECARENONE (COQ-10 ORAL) Take 1 capsule by mouth once daily.       predniSONE (DELTASONE) 10 MG tablet Take 4 tabs x 2 days, then take 3 tabs x 2 days, then take 2 tabs x 2 days, then take 1 tab x 2 days. 20 tablet 0     No current facility-administered medications for this visit.        Last PFT: 09/17/2018  Last CT: 09/06/2018      General: low grade fever, does not feel well  Eyes: Vision is good.  ENT:  Congestion better   Heart:: No chest pain or palpitations.  Lungs: coughing and short of breath since yestrday  GI: reflux controlled with Prilosec  : No dysuria, hesitancy, or nocturia.  Musculoskeletal: RA  Skin: No lesions or rashes.  Neuro: No headaches or neuropathy.  Lymph:  No edema or adenopathy.  Psych: No anxiety or depression.  Endo: weight down a few pounds    OBJECTIVE:      BP (!) 160/80 (BP Location: Left arm, Patient Position: Sitting, BP Method: Medium (Manual))   Pulse 66   Temp 99.6 °F (37.6 °C)   Ht 5' (1.524 m)   Wt 93.9 kg (207 lb)   SpO2 97% Comment: 2LPM  BMI 40.43 kg/m²     Physical Exam  GENERAL: Older patient in no distress.  HEENT: Pupils equal and reactive. Extraocular movements intact. Nose intact.      Pharynx moist.  NECK: Supple.   HEART: Regular rate and rhythm. No murmur or gallop auscultated.  LUNGS: fine crackles to bases  ABDOMEN: Bowel sounds present. Non-tender, no masses palpated.  EXTREMITIES: Normal muscle tone and joint movement, clubbed nails  LYMPHATICS: No adenopathy palpated, 1+ edema.  SKIN: Dry, intact, no lesions.   NEURO: Cranial nerves II-XII intact. Motor strength 5/5 bilaterally, upper and lower extremities.  PSYCH: Appropriate affect.    Assessment:       1. Cough    2. Mild persistent asthma with exacerbation    3. Viral upper respiratory tract infection          Plan:           Use your Qvar 2 puffs twice a day every day  Rapid flu test  Chest xray  Prednisone taper  Ocean spray to nostrils   Use your rescue meds (either nebulizer or rescue inhaler every 4-6 hours)  Follow up in 6 months

## 2019-12-06 NOTE — PATIENT INSTRUCTIONS
Use your Qvar 2 puffs twice a day every day  Rapid flu test  Chest xray  Prednisone taper  Ocean spray to nostrils   Follow up in 6 months

## 2019-12-09 NOTE — TELEPHONE ENCOUNTER
I spoke with the patient she is aware of results.  She said she is getting better. She was instructed to call back if she goes backwards.

## 2019-12-13 ENCOUNTER — HOSPITAL ENCOUNTER (OUTPATIENT)
Dept: PULMONOLOGY | Facility: HOSPITAL | Age: 74
Discharge: HOME | End: 2019-12-13
Attending: NURSE PRACTITIONER
Payer: MEDICARE

## 2019-12-13 DIAGNOSIS — J45.40 MODERATE PERSISTENT ASTHMA: ICD-10-CM

## 2019-12-13 PROCEDURE — 94729 DIFFUSING CAPACITY: CPT

## 2019-12-13 PROCEDURE — 94060 EVALUATION OF WHEEZING: CPT

## 2019-12-13 PROCEDURE — 94727 GAS DIL/WSHOT DETER LNG VOL: CPT

## 2019-12-16 ENCOUNTER — TELEPHONE (OUTPATIENT)
Dept: PULMONOLOGY | Facility: CLINIC | Age: 74
End: 2019-12-16

## 2019-12-16 NOTE — TELEPHONE ENCOUNTER
PFT shows no obstruction, good response to bronchodilator, mild restriction, no diffusion defect.

## 2020-02-20 ENCOUNTER — OFFICE VISIT (OUTPATIENT)
Dept: RHEUMATOLOGY | Facility: CLINIC | Age: 75
End: 2020-02-20
Payer: MEDICARE

## 2020-02-20 VITALS
SYSTOLIC BLOOD PRESSURE: 122 MMHG | WEIGHT: 212 LBS | TEMPERATURE: 98 F | BODY MASS INDEX: 41.4 KG/M2 | DIASTOLIC BLOOD PRESSURE: 82 MMHG

## 2020-02-20 DIAGNOSIS — M06.9 RHEUMATOID ARTHRITIS, INVOLVING UNSPECIFIED SITE, UNSPECIFIED RHEUMATOID FACTOR PRESENCE: Primary | ICD-10-CM

## 2020-02-20 PROCEDURE — 99213 PR OFFICE/OUTPT VISIT, EST, LEVL III, 20-29 MIN: ICD-10-PCS | Mod: S$GLB,,, | Performed by: INTERNAL MEDICINE

## 2020-02-20 PROCEDURE — 99213 OFFICE O/P EST LOW 20 MIN: CPT | Mod: S$GLB,,, | Performed by: INTERNAL MEDICINE

## 2020-02-20 NOTE — PROGRESS NOTES
[facility] RHEUMATOLOGY            PROGRESS NOTE      Subjective:       Patient ID:   NAME: Ling Rapp : 1945     74 y.o. female    Referring Doc: No ref. provider found  Other Physicians:    Chief Complaint:  Rheumatoid Arthritis      History of Present Illness:     Patient returns today for a regularly scheduled follow-up visit for   RA    The patient is doing well.  No fevers cough shortness of breath.  No chest pains.  No prolonged morning stiffness.  She has arthralgias but no joint swelling          ROS:   GEN:  No  fever, night sweats . weight is stable   + some fatigue  SKIN: no rashes, no bruising, no ulcerations, no Raynaud's  HEENT: no HA's, No visual changes, no mucosal ulcers, no sicca symptoms,  CV:   no CP, SOB, PND, SEN, no orthopnea, no palpitations  PULM: normal with no SOB, cough, hemoptysis, sputum or pleuritic pain  GI:  no abdominal pain, nausea, vomiting, constipation, diarrhea, melanotic stools, BRBPR, hematemesis, no dysphagia  :   no dysuria  NEURO: no paresthesias, headaches, visual disturbances, muscle weakness  MUSCULOSKELETAL:no joint swelling, prolonged AM stiffness, no back pain, no muscle pain  Allergies:  Review of patient's allergies indicates:   Allergen Reactions    Amoxicillin-pot clavulanate Diarrhea and Other (See Comments)     Sever cramping    Diltiazem Other (See Comments)     Extreme dry moth    Fenofibrate Other (See Comments)     Other reaction(s): Itching    Hydroxychloroquine      Other reaction(s): eye accomodation problems    Leflunomide      Other reaction(s): abd pains    Lisinopril-hydrochlorothiazide      Other reaction(s): diarrhea  Other reaction(s): cramps    Methotrexate      Other reaction(s): pancreatitis    Norvasc [amlodipine] Swelling    Sulfa (sulfonamide antibiotics)      Other reaction(s): Swelling  Other reaction(s): Hives    Sulfamethoxazole-trimethoprim        Medications:    Current Outpatient Medications:      albuterol (PROAIR HFA) 90 mcg/actuation inhaler, Inhale 2 puffs into the lungs every 6 (six) hours as needed for Wheezing., Disp: 3 Inhaler, Rfl: 5    artificial saliva, reynaldo SprA, Use as directed, Disp: 240 mL, Rfl: 11    aspirin 81 mg Tab, Take 81 mg by mouth once daily. Every day, Disp: , Rfl:     atenolol (TENORMIN) 100 MG tablet, TAKE 1 TABLET DAILY, Disp: 90 tablet, Rfl: 2    azelastine (ASTELIN) 137 mcg (0.1 %) nasal spray, 1 spray by Nasal route 2 (two) times daily., Disp: , Rfl:     beclomethasone (QVAR) 80 mcg/actuation Aero, Inhale 1 puff into the lungs 2 (two) times daily. Controller, Disp: , Rfl:     benzonatate (TESSALON) 200 MG capsule, Take 200 mg by mouth 3 (three) times daily as needed for Cough., Disp: , Rfl:     CALCIUM CARBONATE/VITAMIN D3 (OS-ADDY 500 + D) 500-125 mg-unit Tab, Take 1 tablet by mouth once daily. Twice a day, Disp: , Rfl:     cetirizine (ZYRTEC) 10 MG tablet, Take 10 mg by mouth once daily. Every day, Disp: , Rfl:     cholecalciferol, vitamin D3, 5,000 unit capsule, Take 5,000 Units by mouth once daily. 5 a week, Disp: , Rfl:     cyanocobalamin (VITAMIN B-12) 100 MCG tablet, Take 100 mcg by mouth once daily., Disp: , Rfl:     diclofenac sodium (VOLTAREN) 1 % Gel, Apply 2 g topically once daily., Disp: , Rfl:     etanercept (ENBREL) 50 mg/mL (0.98 mL) injection, 50 mg once a week. once weekly, Disp: , Rfl:     fluticasone (FLONASE) 50 mcg/actuation nasal spray, 2 sprays by Each Nare route once daily. Every day, Disp: 48 g, Rfl: 3    magnesium oxide (MAG-OX) 400 mg tablet, Take 400 mg by mouth once daily., Disp: , Rfl:     montelukast (SINGULAIR) 10 mg tablet, TAKE 1 TABLET EVERY EVENING, Disp: 90 tablet, Rfl: 3    omeprazole (PRILOSEC) 40 MG capsule, TAKE 1 CAPSULE TWICE DAILY BEFORE MEALS, Disp: 180 capsule, Rfl: 2    predniSONE (DELTASONE) 10 MG tablet, Take 4 tabs x 2 days, then take 3 tabs x 2 days, then take 2 tabs x 2 days, then take 1 tab x 2 days.,  Disp: 20 tablet, Rfl: 0    UBIDECARENONE (COQ-10 ORAL), Take 1 capsule by mouth once daily. , Disp: , Rfl:     PMHx/PSHx Updates:        Objective:     Vitals:  Blood pressure 122/82, temperature 98.2 °F (36.8 °C), weight 96.2 kg (212 lb).    Physical Examination:   GEN: no apparent distress, comfortable; AAOx3  SKIN: no rashes,no ulceration, no Raynaud's, no petechiae, no SQ nodules,  HEAD: normal  EYES: no pallor, no icterus,  NECK: no masses, thyroid normal, trachea midline, no LAD/LN's, supple  CV: RRR with no murmur; l S1 and S2 reg. ,no gallop no rubs,   CHEST: Normal respiratory effort; CTAB; normal breath sounds; no wheeze or crackles  MUSC/Skeletal: ROM normal; no crepitus; joints without synovitis,  no deformities  No joint swelling or tenderness of PIP, MCP, wrist, elbow, shoulder, or knee joints  EXTREM: no clubbing, cyanosis, no edema,normal  pulses   NEURO: grossly intact; motor WNL; AAOx3; ,   PSYCH: normal mood, affect and behavior  LYMPH: normal cervical, supraclavicular          Labs:   Lab Results   Component Value Date    WBC 7.64 09/05/2019    HGB 13.6 09/05/2019    HCT 44.0 09/05/2019    MCV 97 09/05/2019     09/05/2019    CMP  @LASTLAB(NA,K,CL,CO2,GLU,BUN,Creatinine,Calcium,PROT,Albumin,Bilitot,Alkphos,AST,ALT,CRP,ESR,RF,CCP,VANNA,SSA,CPK,uric acid) )@  I have reviewed all available lab results and radiology reports.    Radiology/Diagnostic Studies:        Assessment/Plan:   (1) 74 y.o. female with diagnosis of  rheumatoid arthritis.  She is stable    Labs      Follow-up in 3 months      Discussion:     I have explained all of the above in detail and the patient understands all of the current recommendation(s). I have answered all questions to the best of my ability and to their complete satisfaction.       The patient is to continue with the current management plan         RTC in         Electronically signed by Manjinder Schultz MD

## 2020-03-05 ENCOUNTER — LAB VISIT (OUTPATIENT)
Dept: LAB | Facility: HOSPITAL | Age: 75
End: 2020-03-05
Attending: INTERNAL MEDICINE
Payer: MEDICARE

## 2020-03-05 ENCOUNTER — OFFICE VISIT (OUTPATIENT)
Dept: PULMONOLOGY | Facility: CLINIC | Age: 75
End: 2020-03-05
Payer: MEDICARE

## 2020-03-05 VITALS
WEIGHT: 210 LBS | DIASTOLIC BLOOD PRESSURE: 70 MMHG | BODY MASS INDEX: 41.01 KG/M2 | HEART RATE: 84 BPM | SYSTOLIC BLOOD PRESSURE: 132 MMHG | OXYGEN SATURATION: 96 %

## 2020-03-05 DIAGNOSIS — M06.9 RHEUMATOID ARTHRITIS, INVOLVING UNSPECIFIED SITE, UNSPECIFIED RHEUMATOID FACTOR PRESENCE: ICD-10-CM

## 2020-03-05 DIAGNOSIS — G47.33 OSA (OBSTRUCTIVE SLEEP APNEA): ICD-10-CM

## 2020-03-05 DIAGNOSIS — R91.1 LUNG NODULE: ICD-10-CM

## 2020-03-05 DIAGNOSIS — J45.30 MILD PERSISTENT ASTHMA WITHOUT COMPLICATION: Primary | ICD-10-CM

## 2020-03-05 DIAGNOSIS — J96.11 CHRONIC HYPOXEMIC RESPIRATORY FAILURE: ICD-10-CM

## 2020-03-05 LAB
ALBUMIN SERPL BCP-MCNC: 3.7 G/DL (ref 3.5–5.2)
ALP SERPL-CCNC: 84 U/L (ref 55–135)
ALT SERPL W/O P-5'-P-CCNC: 16 U/L (ref 10–44)
ANION GAP SERPL CALC-SCNC: 7 MMOL/L (ref 8–16)
AST SERPL-CCNC: 16 U/L (ref 10–40)
BASOPHILS # BLD AUTO: 0.06 K/UL (ref 0–0.2)
BASOPHILS NFR BLD: 0.9 % (ref 0–1.9)
BILIRUB SERPL-MCNC: 0.8 MG/DL (ref 0.1–1)
BUN SERPL-MCNC: 13 MG/DL (ref 8–23)
CALCIUM SERPL-MCNC: 9.1 MG/DL (ref 8.7–10.5)
CHLORIDE SERPL-SCNC: 104 MMOL/L (ref 95–110)
CO2 SERPL-SCNC: 28 MMOL/L (ref 23–29)
CREAT SERPL-MCNC: 0.7 MG/DL (ref 0.5–1.4)
CRP SERPL-MCNC: 1.6 MG/L (ref 0–8.2)
DIFFERENTIAL METHOD: ABNORMAL
EOSINOPHIL # BLD AUTO: 0.2 K/UL (ref 0–0.5)
EOSINOPHIL NFR BLD: 3.6 % (ref 0–8)
ERYTHROCYTE [DISTWIDTH] IN BLOOD BY AUTOMATED COUNT: 13.2 % (ref 11.5–14.5)
EST. GFR  (AFRICAN AMERICAN): >60 ML/MIN/1.73 M^2
EST. GFR  (NON AFRICAN AMERICAN): >60 ML/MIN/1.73 M^2
GLUCOSE SERPL-MCNC: 97 MG/DL (ref 70–110)
HCT VFR BLD AUTO: 44.8 % (ref 37–48.5)
HGB BLD-MCNC: 13.5 G/DL (ref 12–16)
IMM GRANULOCYTES # BLD AUTO: 0.01 K/UL (ref 0–0.04)
IMM GRANULOCYTES NFR BLD AUTO: 0.1 % (ref 0–0.5)
LYMPHOCYTES # BLD AUTO: 2.8 K/UL (ref 1–4.8)
LYMPHOCYTES NFR BLD: 41.2 % (ref 18–48)
MCH RBC QN AUTO: 29.9 PG (ref 27–31)
MCHC RBC AUTO-ENTMCNC: 30.1 G/DL (ref 32–36)
MCV RBC AUTO: 99 FL (ref 82–98)
MONOCYTES # BLD AUTO: 0.8 K/UL (ref 0.3–1)
MONOCYTES NFR BLD: 12.3 % (ref 4–15)
NEUTROPHILS # BLD AUTO: 2.8 K/UL (ref 1.8–7.7)
NEUTROPHILS NFR BLD: 41.9 % (ref 38–73)
NRBC BLD-RTO: 0 /100 WBC
PLATELET # BLD AUTO: 187 K/UL (ref 150–350)
PMV BLD AUTO: 10.7 FL (ref 9.2–12.9)
POTASSIUM SERPL-SCNC: 4.1 MMOL/L (ref 3.5–5.1)
PROT SERPL-MCNC: 7.9 G/DL (ref 6–8.4)
RBC # BLD AUTO: 4.51 M/UL (ref 4–5.4)
SODIUM SERPL-SCNC: 139 MMOL/L (ref 136–145)
WBC # BLD AUTO: 6.74 K/UL (ref 3.9–12.7)

## 2020-03-05 PROCEDURE — 99214 PR OFFICE/OUTPT VISIT, EST, LEVL IV, 30-39 MIN: ICD-10-PCS | Mod: S$GLB,,, | Performed by: NURSE PRACTITIONER

## 2020-03-05 PROCEDURE — 80053 COMPREHEN METABOLIC PANEL: CPT

## 2020-03-05 PROCEDURE — 99214 OFFICE O/P EST MOD 30 MIN: CPT | Mod: S$GLB,,, | Performed by: NURSE PRACTITIONER

## 2020-03-05 PROCEDURE — 86140 C-REACTIVE PROTEIN: CPT

## 2020-03-05 PROCEDURE — 85025 COMPLETE CBC W/AUTO DIFF WBC: CPT

## 2020-03-05 NOTE — PATIENT INSTRUCTIONS
Understanding Asthma    Asthma causes swelling and narrowing of the airways in your lungs. Medical experts are not exactly sure what causes asthma. It is believed to be caused by a mix of inherited and environmental factors.  Healthy lungs  Inside the lungs there are branching airways made of stretchy tissue. Each airway is wrapped with bands of muscle. The airways become more narrow as they go deeper into the lungs. The smallest airways end in clusters of tiny balloon-like air sacs (alveoli). These clusters are surrounded by blood vessels. When you breathe in (inhale), air enters the lungs. It travels down through the airways until it reaches the air sacs. When you breathe out (exhale), air travels up through the airways and out of the lungs. The airways produce mucus that traps particles you breathe in. Normally, the mucus is then swept out of the lungs by tiny hairs (cilia) that line the airways. The mucus is swallowed or coughed up.  What the lungs do  The air you inhale contains oxygen. When oxygen reaches the air sacs, it passes into the blood vessels surrounding the sacs. Your blood then delivers oxygen to all of your cells. As you exhale, carbon dioxide is removed in a similar way from the blood in the air sacs, and from the body.  When you have asthma  People with asthma have very sensitive airways. This means the airways react to certain things called triggers (such as pollen, dust, or smoke) and become swollen and narrowed. Inflammation makes the airways swollen and narrowed. This is a long-lasting (chronic) problem. The airways may not always be narrowed enough to notice breathing problems.  Symptoms of chronic inflammation:   · Coughing  · Chest tightness  · Shortness of breath  · Wheezing (a whistling noise, especially when breathing out)  · Low energy or feeling tired  In some people, over time chronic mild inflammation can lead to lasting (permanent) scarring of airways and loss of lung  function.  Moderate flare-ups  When sensitive airways are irritated by a trigger, the muscles around the airways tighten. The lining of the airways swells. Thick, sticky mucus increases and partly clogs the airways. All of this makes you work harder to keep breathing.  Symptoms of moderate flare-ups:  · Coughing, especially at night  · Getting tired or out of breath easily  · Wheezing  · Chest tightness  · Faster breathing when at rest  Severe flare-ups  Severe flare-ups are life-threatening. In a severe flare-up, the muscle tightening, swelling, and mucus production are even worse. Its very hard to breathe. Your body can't get enough oxygen and can't remove carbon dioxide. Waste gas is trapped in the alveoli, and gas exchange cant occur. The body is not getting enough oxygen. Without oxygen, body tissues, especially brain tissue, begin to get damaged. If this goes on for long, it can lead to severe brain damage or death.  Call 911 (or have someone call for you) if you have any of these symptoms and they are not relieved right away by taking your quick-relief medicine as prescribed:  · Severe trouble breathing  · Too short of breath to talk or walk  · Lips or fingers turning blue  · Feeling lightheaded or dizzy, as though you are about to pass out  · Peak flow less than 50% of your personal best, if you use peak flow monitoring  Asthma is a long-term condition. So its important to work with your healthcare provider to manage it. If you smoke, get help to quit. Know your triggers and figure out how to avoid them. Its also very important to take your medicines as directed. That means taking them even when you feel good.  Date Last Reviewed: 12/1/2016  © 9864-9662 [employer]. [address] [place] [address], [place] [address]. All rights reserved. This information is not intended as a substitute for professional medical care. Always follow your healthcare professional's instructions.        Understanding  Asthma    Asthma causes swelling and narrowing of the airways in your lungs. Medical experts are not exactly sure what causes asthma. It is believed to be caused by a mix of inherited and environmental factors.  Healthy lungs  Inside the lungs there are branching airways made of stretchy tissue. Each airway is wrapped with bands of muscle. The airways become more narrow as they go deeper into the lungs. The smallest airways end in clusters of tiny balloon-like air sacs (alveoli). These clusters are surrounded by blood vessels. When you breathe in (inhale), air enters the lungs. It travels down through the airways until it reaches the air sacs. When you breathe out (exhale), air travels up through the airways and out of the lungs. The airways produce mucus that traps particles you breathe in. Normally, the mucus is then swept out of the lungs by tiny hairs (cilia) that line the airways. The mucus is swallowed or coughed up.  What the lungs do  The air you inhale contains oxygen. When oxygen reaches the air sacs, it passes into the blood vessels surrounding the sacs. Your blood then delivers oxygen to all of your cells. As you exhale, carbon dioxide is removed in a similar way from the blood in the air sacs, and from the body.  When you have asthma  People with asthma have very sensitive airways. This means the airways react to certain things called triggers (such as pollen, dust, or smoke) and become swollen and narrowed. Inflammation makes the airways swollen and narrowed. This is a long-lasting (chronic) problem. The airways may not always be narrowed enough to notice breathing problems.  Symptoms of chronic inflammation:   · Coughing  · Chest tightness  · Shortness of breath  · Wheezing (a whistling noise, especially when breathing out)  · Low energy or feeling tired  In some people, over time chronic mild inflammation can lead to lasting (permanent) scarring of airways and loss of lung function.  Moderate  flare-ups  When sensitive airways are irritated by a trigger, the muscles around the airways tighten. The lining of the airways swells. Thick, sticky mucus increases and partly clogs the airways. All of this makes you work harder to keep breathing.  Symptoms of moderate flare-ups:  · Coughing, especially at night  · Getting tired or out of breath easily  · Wheezing  · Chest tightness  · Faster breathing when at rest  Severe flare-ups  Severe flare-ups are life-threatening. In a severe flare-up, the muscle tightening, swelling, and mucus production are even worse. Its very hard to breathe. Your body can't get enough oxygen and can't remove carbon dioxide. Waste gas is trapped in the alveoli, and gas exchange cant occur. The body is not getting enough oxygen. Without oxygen, body tissues, especially brain tissue, begin to get damaged. If this goes on for long, it can lead to severe brain damage or death.  Call 911 (or have someone call for you) if you have any of these symptoms and they are not relieved right away by taking your quick-relief medicine as prescribed:  · Severe trouble breathing  · Too short of breath to talk or walk  · Lips or fingers turning blue  · Feeling lightheaded or dizzy, as though you are about to pass out  · Peak flow less than 50% of your personal best, if you use peak flow monitoring  Asthma is a long-term condition. So its important to work with your healthcare provider to manage it. If you smoke, get help to quit. Know your triggers and figure out how to avoid them. Its also very important to take your medicines as directed. That means taking them even when you feel good.  Date Last Reviewed: 12/1/2016 © 2000-2017 [employer]. [address] [place] [address], [place] [address]. All rights reserved. This information is not intended as a substitute for professional medical care. Always follow your healthcare professional's instructions.          Use your Qvar 2 puffs twice a day  every day  Continue the Singulair   Call if you get sick  Keep wearing your oxygen  Keep sleeping on your CPAP with oxygen bled in   Ct chest without contrast   Follow up in 6 months

## 2020-03-05 NOTE — PROGRESS NOTES
SUBJECTIVE:    Patient ID: Ling Rapp is a 74 y.o. female.    Chief Complaint: Asthma (6 month check up )      Patient here today feeling well.  She is only using 1 puff of Qvar twice a day because her breathing has been well and not coughing much.  She is doing well with her CPAP.   She is bleeding in her oxygen to the CPAP.  She is on Enbrel for RA which is controlled. She does feel more arthritic pain with changes in the weather. Her last PFT showed no obstruction with good response to bronchodilator, mild restriction, and no diffusion defect. Her last CT was in 2018.     Past Medical History:   Diagnosis Date    ALLERGIC RHINITIS     Anticoagulant long-term use     Asthma     Cataract     Former smoker     GERD (gastroesophageal reflux disease)     Hemorrhoids     Hypertension     DANE (obstructive sleep apnea)     Osteopenia     Pancreatitis     secondary to severe hypertriglyceridemia    Peptic ulcer     Rheumatoid arthritis     Vitamin D deficiency      Past Surgical History:   Procedure Laterality Date    Athroscopy(knee)      BREAST BIOPSY Left 2017    benign    CARDIAC CATHETERIZATION      CATARACT EXTRACTION Left 2013    COLONOSCOPY N/A 3/30/2016    Procedure: COLONOSCOPY;  Surgeon: Thee Sorenson MD;  [facility];  Service: Endoscopy;  Laterality: N/A;    EYE SURGERY      cataracts bilateral     FRACTURE SURGERY  1997    right knee     HYSTERECTOMY  1982    PARTIAL HYSTERECTOMY      w/ USO    TONSILLECTOMY      TRANSFORAMINAL EPIDURAL INJECTION OF STEROID Right 4/15/2019    Procedure: Injection,steroid,epidural,transforaminal approach;  Surgeon: Fernando Jenkins MD;  Location: [facility] OR;  Service: Pain Management;  Laterality: Right;  L4-5, L5-S1    UPPER GASTROINTESTINAL ENDOSCOPY  5/2013     Family History   Problem Relation Age of Onset    Colon polyps Sister 64    Ulcers Father     Emphysema Father         smoker    Alcohol abuse Father     Thyroid  disease Mother     Heart disease Mother     Emphysema Mother         smoker    Alzheimer's disease Brother     Stroke Brother     Cancer Brother     Throat cancer Brother         non smoker    No Known Problems Son     Diabetes Maternal Uncle         1/2 brother    Cancer Paternal Aunt     Early death Paternal Aunt     Leukemia Paternal Aunt         highschool    Breast cancer Paternal Aunt     Heart disease Paternal Uncle     Rheum arthritis Maternal Grandmother     Pneumonia Maternal Grandfather     Cancer Paternal Grandmother     Breast cancer Paternal Grandmother     Early death Paternal Grandfather          killed in line of duty        Social History:   Marital Status:   Occupation:retired    Alcohol History:  reports that she does not drink alcohol.  Tobacco History:  reports that she quit smoking about 48 years ago. Her smoking use included cigarettes. She has a 10.00 pack-year smoking history. She has never used smokeless tobacco.  Drug History:  reports that she does not use drugs.    Review of patient's allergies indicates:   Allergen Reactions    Amoxicillin-pot clavulanate Diarrhea and Other (See Comments)     Sever cramping    Diltiazem Other (See Comments)     Extreme dry moth    Fenofibrate Other (See Comments)     Other reaction(s): Itching    Hydroxychloroquine      Other reaction(s): eye accomodation problems    Leflunomide      Other reaction(s): abd pains    Lisinopril-hydrochlorothiazide      Other reaction(s): diarrhea  Other reaction(s): cramps    Methotrexate      Other reaction(s): pancreatitis    Norvasc [amlodipine] Swelling    Sulfa (sulfonamide antibiotics)      Other reaction(s): Swelling  Other reaction(s): Hives    Sulfamethoxazole-trimethoprim        Current Outpatient Medications   Medication Sig Dispense Refill    albuterol (PROAIR HFA) 90 mcg/actuation inhaler Inhale 2 puffs into the lungs every 6 (six) hours as needed for Wheezing.  3 Inhaler 5    artificial saliva, yerbas-lyt, SprA Use as directed 240 mL 11    aspirin 81 mg Tab Take 81 mg by mouth once daily. Every day      atenolol (TENORMIN) 100 MG tablet TAKE 1 TABLET DAILY 90 tablet 2    azelastine (ASTELIN) 137 mcg (0.1 %) nasal spray 1 spray by Nasal route 2 (two) times daily.      beclomethasone (QVAR) 80 mcg/actuation Aero Inhale 1 puff into the lungs 2 (two) times daily. Controller      benzonatate (TESSALON) 200 MG capsule Take 200 mg by mouth 3 (three) times daily as needed for Cough.      CALCIUM CARBONATE/VITAMIN D3 (OS-ADDY 500 + D) 500-125 mg-unit Tab Take 1 tablet by mouth once daily. Twice a day      cetirizine (ZYRTEC) 10 MG tablet Take 10 mg by mouth once daily. Every day      cholecalciferol, vitamin D3, 5,000 unit capsule Take 5,000 Units by mouth once daily. 5 a week      cyanocobalamin (VITAMIN B-12) 100 MCG tablet Take 100 mcg by mouth once daily.      diclofenac sodium (VOLTAREN) 1 % Gel Apply 2 g topically once daily.      etanercept (ENBREL) 50 mg/mL (0.98 mL) injection 50 mg once a week. once weekly      fluticasone (FLONASE) 50 mcg/actuation nasal spray 2 sprays by Each Nare route once daily. Every day 48 g 3    magnesium oxide (MAG-OX) 400 mg tablet Take 400 mg by mouth once daily.      montelukast (SINGULAIR) 10 mg tablet TAKE 1 TABLET EVERY EVENING 90 tablet 3    omeprazole (PRILOSEC) 40 MG capsule TAKE 1 CAPSULE TWICE DAILY BEFORE MEALS 180 capsule 2    UBIDECARENONE (COQ-10 ORAL) Take 1 capsule by mouth once daily.       predniSONE (DELTASONE) 10 MG tablet Take 4 tabs x 2 days, then take 3 tabs x 2 days, then take 2 tabs x 2 days, then take 1 tab x 2 days. (Patient not taking: Reported on 3/5/2020) 20 tablet 0     No current facility-administered medications for this visit.        Last PFT: 12/2020 no obstruction, good response to bronchodilator, mild restriction, no diffusion defect     Last Chest xray 12/2019    General: feeling well  Eyes:  Vision is good.  ENT:  Congestion better   Heart:: No chest pain or palpitations.  Lungs: dyspnea with walking and talking at same time, coughs every now and then but nothing like it used to be   GI: reflux controlled with Prilosec  : No dysuria, hesitancy, or nocturia.  Musculoskeletal: weather makes her joints hurt more  Skin: No lesions or rashes.  Neuro: No headaches or neuropathy.  Lymph: swelling to legs   Psych: No anxiety or depression.  Endo: weight gain     OBJECTIVE:      /70 (BP Location: Left arm, Patient Position: Sitting)   Pulse 84   Wt 95.3 kg (210 lb)   SpO2 96% Comment: 2.0 oxyegn level  BMI 41.01 kg/m²     Physical Exam  GENERAL: Older patient in no distress.  HEENT: Pupils equal and reactive. Extraocular movements intact. Nose intact.      Pharynx moist.  NECK: Supple.   HEART: Regular rate and rhythm. No murmur or gallop auscultated.  LUNGS: fine crackles to bases  ABDOMEN: Bowel sounds present. Non-tender, no masses palpated.  EXTREMITIES: Normal muscle tone and joint movement, clubbed nails  LYMPHATICS: No adenopathy palpated, 1+ edema.  SKIN: Dry, intact, no lesions.   NEURO: Cranial nerves II-XII intact. Motor strength 5/5 bilaterally, upper and lower extremities.  PSYCH: Appropriate affect.    Assessment:       1. Mild persistent asthma without complication    2. DANE (obstructive sleep apnea)    3. Rheumatoid arthritis, involving unspecified site, unspecified rheumatoid factor presence    4. Chronic hypoxemic respiratory failure    5. Lung nodule          Plan:           Use your Qvar 2 puffs twice a day every day  Continue the Singulair   Call if you get sick  Keep wearing your oxygen  Keep sleeping on your CPAP with oxygen bled in   Ct chest without contrast  Follow up in 6 months

## 2020-03-11 ENCOUNTER — TELEPHONE (OUTPATIENT)
Dept: PULMONOLOGY | Facility: CLINIC | Age: 75
End: 2020-03-11

## 2020-03-11 ENCOUNTER — HOSPITAL ENCOUNTER (OUTPATIENT)
Dept: RADIOLOGY | Facility: HOSPITAL | Age: 75
Discharge: HOME | End: 2020-03-11
Attending: NURSE PRACTITIONER
Payer: MEDICARE

## 2020-03-11 DIAGNOSIS — R91.1 LUNG NODULE: ICD-10-CM

## 2020-03-11 PROCEDURE — 71250 CT THORAX DX C-: CPT | Mod: TC,PO

## 2020-03-11 NOTE — TELEPHONE ENCOUNTER
Impression       Stable 8 mm fissural nodule in the right upper lobe unchanged from 06/10/2016    Stable chronic changes bilaterally with no acute pulmonary process     The patient is aware

## 2020-04-18 DIAGNOSIS — J45.909 ASTHMA, UNSPECIFIED ASTHMA SEVERITY, UNSPECIFIED WHETHER COMPLICATED, UNSPECIFIED WHETHER PERSISTENT: ICD-10-CM

## 2020-04-18 RX ORDER — MONTELUKAST SODIUM 10 MG/1
TABLET ORAL
Qty: 90 TABLET | Refills: 3 | Status: SHIPPED | OUTPATIENT
Start: 2020-04-18 | End: 2021-07-26 | Stop reason: SDUPTHER

## 2020-05-05 ENCOUNTER — PATIENT MESSAGE (OUTPATIENT)
Dept: ADMINISTRATIVE | Facility: HOSPITAL | Age: 75
End: 2020-05-05

## 2020-07-06 ENCOUNTER — OFFICE VISIT (OUTPATIENT)
Dept: RHEUMATOLOGY | Facility: CLINIC | Age: 75
End: 2020-07-06
Payer: MEDICARE

## 2020-07-06 VITALS
DIASTOLIC BLOOD PRESSURE: 82 MMHG | WEIGHT: 213.81 LBS | SYSTOLIC BLOOD PRESSURE: 118 MMHG | BODY MASS INDEX: 41.75 KG/M2 | TEMPERATURE: 97 F

## 2020-07-06 DIAGNOSIS — M06.9 RHEUMATOID ARTHRITIS, INVOLVING UNSPECIFIED SITE, UNSPECIFIED RHEUMATOID FACTOR PRESENCE: Primary | ICD-10-CM

## 2020-07-06 PROCEDURE — 99213 OFFICE O/P EST LOW 20 MIN: CPT | Mod: S$GLB,,, | Performed by: INTERNAL MEDICINE

## 2020-07-06 PROCEDURE — 99213 PR OFFICE/OUTPT VISIT, EST, LEVL III, 20-29 MIN: ICD-10-PCS | Mod: S$GLB,,, | Performed by: INTERNAL MEDICINE

## 2020-07-06 NOTE — PROGRESS NOTES
[facility] RHEUMATOLOGY            PROGRESS NOTE      Subjective:       Patient ID:   NAME: Ling Rapp : 1945     74 y.o. female    Referring Doc: No ref. provider found  Other Physicians:    Chief Complaint:  Rheumatoid Arthritis      History of Present Illness:     Patient returns today for a regularly scheduled follow-up visit for  rheumatoid arthritis and osteoarthritis     The patient denies prolonged morning stiffness or joint swelling.  She has chronic arthralgias.  No fevers or chest pains.  No cough.  Chronic shortness of breath.  No GI complaints.  No paresthesias.  Slight fatigue            ROS:   GEN:  No  fever, night sweats . weight is stable   + sl fatigue  SKIN: no rashes, no bruising, no ulcerations, no Raynaud's  HEENT: no HA's, No visual changes, no mucosal ulcers, no sicca symptoms,  CV:   no CP, SOB, PND, SEN, no orthopnea, no palpitations  PULM: normal with no SOB, cough, hemoptysis, sputum or pleuritic pain  GI:  no abdominal pain, nausea, vomiting, constipation, diarrhea, melanotic stools, BRBPR, hematemesis, no dysphagia  :   no dysuria  NEURO: no paresthesias, headaches, visual disturbances, muscle weakness  MUSCULOSKELETAL:no joint swelling, prolonged AM stiffness, no back pain, no muscle pain  Allergies:  Review of patient's allergies indicates:   Allergen Reactions    Amoxicillin-pot clavulanate Diarrhea and Other (See Comments)     Sever cramping    Diltiazem Other (See Comments)     Extreme dry moth    Fenofibrate Other (See Comments)     Other reaction(s): Itching    Hydroxychloroquine      Other reaction(s): eye accomodation problems    Leflunomide      Other reaction(s): abd pains    Lisinopril-hydrochlorothiazide      Other reaction(s): diarrhea  Other reaction(s): cramps    Methotrexate      Other reaction(s): pancreatitis    Norvasc [amlodipine] Swelling    Sulfa (sulfonamide antibiotics)      Other reaction(s): Swelling  Other reaction(s): Hives     Sulfamethoxazole-trimethoprim        Medications:    Current Outpatient Medications:     albuterol (PROAIR HFA) 90 mcg/actuation inhaler, Inhale 2 puffs into the lungs every 6 (six) hours as needed for Wheezing., Disp: 3 Inhaler, Rfl: 5    artificial saliva, yerbas-lyt, SprA, Use as directed, Disp: 240 mL, Rfl: 11    aspirin 81 mg Tab, Take 81 mg by mouth once daily. Every day, Disp: , Rfl:     atenolol (TENORMIN) 100 MG tablet, TAKE 1 TABLET DAILY, Disp: 90 tablet, Rfl: 2    azelastine (ASTELIN) 137 mcg (0.1 %) nasal spray, 1 spray by Nasal route 2 (two) times daily., Disp: , Rfl:     beclomethasone (QVAR) 80 mcg/actuation Aero, Inhale 1 puff into the lungs 2 (two) times daily. Controller, Disp: , Rfl:     benzonatate (TESSALON) 200 MG capsule, Take 200 mg by mouth 3 (three) times daily as needed for Cough., Disp: , Rfl:     CALCIUM CARBONATE/VITAMIN D3 (OS-ADDY 500 + D) 500-125 mg-unit Tab, Take 1 tablet by mouth once daily. Twice a day, Disp: , Rfl:     cetirizine (ZYRTEC) 10 MG tablet, Take 10 mg by mouth once daily. Every day, Disp: , Rfl:     cholecalciferol, vitamin D3, 5,000 unit capsule, Take 5,000 Units by mouth once daily. 5 a week, Disp: , Rfl:     cyanocobalamin (VITAMIN B-12) 100 MCG tablet, Take 100 mcg by mouth once daily., Disp: , Rfl:     diclofenac sodium (VOLTAREN) 1 % Gel, Apply 2 g topically once daily., Disp: , Rfl:     etanercept (ENBREL) 50 mg/mL (0.98 mL) injection, 50 mg once a week. once weekly, Disp: , Rfl:     fluticasone (FLONASE) 50 mcg/actuation nasal spray, 2 sprays by Each Nare route once daily. Every day, Disp: 48 g, Rfl: 3    magnesium oxide (MAG-OX) 400 mg tablet, Take 400 mg by mouth once daily., Disp: , Rfl:     montelukast (SINGULAIR) 10 mg tablet, TAKE 1 TABLET EVERY EVENING, Disp: 90 tablet, Rfl: 3    omeprazole (PRILOSEC) 40 MG capsule, TAKE 1 CAPSULE TWICE DAILY BEFORE MEALS, Disp: 180 capsule, Rfl: 2    UBIDECARENONE (COQ-10 ORAL), Take 1 capsule by  mouth once daily. , Disp: , Rfl:     predniSONE (DELTASONE) 10 MG tablet, Take 4 tabs x 2 days, then take 3 tabs x 2 days, then take 2 tabs x 2 days, then take 1 tab x 2 days. (Patient not taking: Reported on 3/5/2020), Disp: 20 tablet, Rfl: 0    PMHx/PSHx Updates:          Objective:     Vitals:  Blood pressure 118/82, temperature 97.1 °F (36.2 °C), weight 97 kg (213 lb 12.8 oz).    Physical Examination:   GEN: no apparent distress, comfortable; AAOx3  SKIN: no rashes,no ulceration, no Raynaud's, no petechiae, no SQ nodules,  HEAD: normal  EYES: no pallor, no icterus  NECK: no masses, thyroid normal, trachea midline, no LAD/LN's, supple  CV: RRR with no murmur; l S1 and S2 reg. ,no gallop no rubs,   CHEST: Normal respiratory effort; CTAB; normal breath sounds; no wheeze or crackles  MUSC/Skeletal: ROM normal; no crepitus; joints without synovitis,  + Heberden's deformities  No joint swelling or tenderness of PIP, MCP, wrist, elbow, shoulder, or knee joints  EXTREM: no clubbing, cyanosis, no edema,normal  pulses   NEURO: grossly intact; motor WNL; AAOx3;  PSYCH: normal mood, affect and behavior  LYMPH: normal cervical, supraclavicular          Labs:   Lab Results   Component Value Date    WBC 6.74 03/05/2020    HGB 13.5 03/05/2020    HCT 44.8 03/05/2020    MCV 99 (H) 03/05/2020     03/05/2020    CMP  @[facility](NA,K,CL,CO2,GLU,BUN,Creatinine,Calcium,PROT,Albumin,Bilitot,Alkphos,AST,ALT,CRP,ESR,RF,CCP,VANNA,SSA,CPK,uric acid) )@  I have reviewed all available lab results and radiology reports.    Radiology/Diagnostic Studies:        Assessment/Plan:   (1) 74 y.o. female with diagnosis of rheumatoid arthritis and osteoarthritis.  She is stable      CBC CMP CRP uric acid          Discussion:     I have explained all of the above in detail and the patient understands all of the current recommendation(s). I have answered all questions to the best of my ability and to their complete satisfaction.       The patient is  to continue with the current management plan         RTC in   3 months or before if needed      Electronically signed by Manjinder Schultz MD

## 2020-08-20 ENCOUNTER — PATIENT OUTREACH (OUTPATIENT)
Dept: ADMINISTRATIVE | Facility: HOSPITAL | Age: 75
End: 2020-08-20

## 2020-08-20 NOTE — PROGRESS NOTES
Chart Reviewed  Care everywhere and media reviewed  [facility] reviewed.    Portal message sent to patient for [facility]

## 2020-09-02 ENCOUNTER — PATIENT MESSAGE (OUTPATIENT)
Dept: FAMILY MEDICINE | Facility: CLINIC | Age: 75
End: 2020-09-02

## 2020-09-02 ENCOUNTER — TELEPHONE (OUTPATIENT)
Dept: FAMILY MEDICINE | Facility: CLINIC | Age: 75
End: 2020-09-02

## 2020-09-02 NOTE — TELEPHONE ENCOUNTER
----- Message from Antoinette Ann MA sent at 9/2/2020  8:59 AM CDT -----  Regarding: Call back  Requesting call back to R/s appt  Next opening : 11/10  Best Call Back #

## 2020-09-09 ENCOUNTER — LAB VISIT (OUTPATIENT)
Dept: LAB | Facility: HOSPITAL | Age: 75
End: 2020-09-09
Attending: INTERNAL MEDICINE
Payer: MEDICARE

## 2020-09-09 ENCOUNTER — OFFICE VISIT (OUTPATIENT)
Dept: PULMONOLOGY | Facility: CLINIC | Age: 75
End: 2020-09-09
Payer: MEDICARE

## 2020-09-09 VITALS
TEMPERATURE: 97 F | SYSTOLIC BLOOD PRESSURE: 170 MMHG | HEIGHT: 60 IN | DIASTOLIC BLOOD PRESSURE: 86 MMHG | OXYGEN SATURATION: 98 % | HEART RATE: 64 BPM | WEIGHT: 218 LBS | BODY MASS INDEX: 42.8 KG/M2

## 2020-09-09 DIAGNOSIS — J45.30 MILD PERSISTENT ASTHMA WITHOUT COMPLICATION: Primary | ICD-10-CM

## 2020-09-09 DIAGNOSIS — J96.11 CHRONIC HYPOXEMIC RESPIRATORY FAILURE: ICD-10-CM

## 2020-09-09 DIAGNOSIS — G47.33 OSA (OBSTRUCTIVE SLEEP APNEA): ICD-10-CM

## 2020-09-09 DIAGNOSIS — R91.1 PULMONARY NODULE: ICD-10-CM

## 2020-09-09 DIAGNOSIS — M06.9 ATROPHIC ARTHRITIS: Primary | ICD-10-CM

## 2020-09-09 LAB
ALBUMIN SERPL BCP-MCNC: 4.1 G/DL (ref 3.5–5.2)
ALP SERPL-CCNC: 68 U/L (ref 55–135)
ALT SERPL W/O P-5'-P-CCNC: 23 U/L (ref 10–44)
ANION GAP SERPL CALC-SCNC: 9 MMOL/L (ref 8–16)
AST SERPL-CCNC: 20 U/L (ref 10–40)
BASOPHILS # BLD AUTO: 0.06 K/UL (ref 0–0.2)
BASOPHILS NFR BLD: 0.7 % (ref 0–1.9)
BILIRUB SERPL-MCNC: 1 MG/DL (ref 0.1–1)
BUN SERPL-MCNC: 16 MG/DL (ref 8–23)
CALCIUM SERPL-MCNC: 9.2 MG/DL (ref 8.7–10.5)
CHLORIDE SERPL-SCNC: 100 MMOL/L (ref 95–110)
CO2 SERPL-SCNC: 30 MMOL/L (ref 23–29)
CREAT SERPL-MCNC: 0.6 MG/DL (ref 0.5–1.4)
CRP SERPL-MCNC: 0.12 MG/DL
DIFFERENTIAL METHOD: ABNORMAL
EOSINOPHIL # BLD AUTO: 0.2 K/UL (ref 0–0.5)
EOSINOPHIL NFR BLD: 2.5 % (ref 0–8)
ERYTHROCYTE [DISTWIDTH] IN BLOOD BY AUTOMATED COUNT: 12.7 % (ref 11.5–14.5)
EST. GFR  (AFRICAN AMERICAN): >60 ML/MIN/1.73 M^2
EST. GFR  (NON AFRICAN AMERICAN): >60 ML/MIN/1.73 M^2
GLUCOSE SERPL-MCNC: 94 MG/DL (ref 70–110)
HCT VFR BLD AUTO: 42.8 % (ref 37–48.5)
HGB BLD-MCNC: 13.5 G/DL (ref 12–16)
IMM GRANULOCYTES # BLD AUTO: 0.03 K/UL (ref 0–0.04)
IMM GRANULOCYTES NFR BLD AUTO: 0.3 % (ref 0–0.5)
LYMPHOCYTES # BLD AUTO: 2.9 K/UL (ref 1–4.8)
LYMPHOCYTES NFR BLD: 33 % (ref 18–48)
MCH RBC QN AUTO: 30.4 PG (ref 27–31)
MCHC RBC AUTO-ENTMCNC: 31.5 G/DL (ref 32–36)
MCV RBC AUTO: 96 FL (ref 82–98)
MONOCYTES # BLD AUTO: 1 K/UL (ref 0.3–1)
MONOCYTES NFR BLD: 11.5 % (ref 4–15)
NEUTROPHILS # BLD AUTO: 4.5 K/UL (ref 1.8–7.7)
NEUTROPHILS NFR BLD: 52 % (ref 38–73)
NRBC BLD-RTO: 0 /100 WBC
PLATELET # BLD AUTO: 189 K/UL (ref 150–350)
PMV BLD AUTO: 10.4 FL (ref 9.2–12.9)
POTASSIUM SERPL-SCNC: 3.7 MMOL/L (ref 3.5–5.1)
PROT SERPL-MCNC: 7.9 G/DL (ref 6–8.4)
RBC # BLD AUTO: 4.44 M/UL (ref 4–5.4)
SODIUM SERPL-SCNC: 139 MMOL/L (ref 136–145)
URATE SERPL-MCNC: 5.3 MG/DL (ref 2.4–5.7)
WBC # BLD AUTO: 8.69 K/UL (ref 3.9–12.7)

## 2020-09-09 PROCEDURE — 86140 C-REACTIVE PROTEIN: CPT

## 2020-09-09 PROCEDURE — 36415 COLL VENOUS BLD VENIPUNCTURE: CPT

## 2020-09-09 PROCEDURE — 99214 OFFICE O/P EST MOD 30 MIN: CPT | Mod: S$GLB,,, | Performed by: NURSE PRACTITIONER

## 2020-09-09 PROCEDURE — 84550 ASSAY OF BLOOD/URIC ACID: CPT

## 2020-09-09 PROCEDURE — 99214 PR OFFICE/OUTPT VISIT, EST, LEVL IV, 30-39 MIN: ICD-10-PCS | Mod: S$GLB,,, | Performed by: NURSE PRACTITIONER

## 2020-09-09 PROCEDURE — 85025 COMPLETE CBC W/AUTO DIFF WBC: CPT

## 2020-09-09 PROCEDURE — 80053 COMPREHEN METABOLIC PANEL: CPT

## 2020-09-09 NOTE — PROGRESS NOTES
SUBJECTIVE:    Patient ID: Ling Rapp is a 74 y.o. female.    Chief Complaint: Follow-up      Patient here today feeling well.    She is doing well with her CPAP.   She is bleeding in her oxygen to the CPAP.  She is on Enbrel for RA which is controlled.  Her CT is stable. She is using Qvar 2 puffs twice a day.  She has gained more weight, and does not exercise.     Past Medical History:   Diagnosis Date    ALLERGIC RHINITIS     Anticoagulant long-term use     Asthma     Cataract     Former smoker     GERD (gastroesophageal reflux disease)     Hemorrhoids     Hypertension     DANE (obstructive sleep apnea)     Osteopenia     Pancreatitis     secondary to severe hypertriglyceridemia    Peptic ulcer     Rheumatoid arthritis     Vitamin D deficiency      Past Surgical History:   Procedure Laterality Date    Athroscopy(knee)      BREAST BIOPSY Left 2017    benign    CARDIAC CATHETERIZATION      CATARACT EXTRACTION Left 2013    COLONOSCOPY N/A 3/30/2016    Procedure: COLONOSCOPY;  Surgeon: Thee Sorenson MD;  Location: [facility];  Service: Endoscopy;  Laterality: N/A;    EYE SURGERY      cataracts bilateral     FRACTURE SURGERY  1997    right knee     HYSTERECTOMY  1982    PARTIAL HYSTERECTOMY      w/ USO    TONSILLECTOMY      TRANSFORAMINAL EPIDURAL INJECTION OF STEROID Right 4/15/2019    Procedure: Injection,steroid,epidural,transforaminal approach;  Surgeon: Fernando Jenkins MD;  [facility];  Service: Pain Management;  Laterality: Right;  L4-5, L5-S1    UPPER GASTROINTESTINAL ENDOSCOPY  5/2013     Family History   Problem Relation Age of Onset    Colon polyps Sister 64    Ulcers Father     Emphysema Father         smoker    Alcohol abuse Father     Thyroid disease Mother     Heart disease Mother     Emphysema Mother         smoker    Alzheimer's disease Brother     Stroke Brother     Cancer Brother     Throat cancer Brother         non smoker    No Known Problems  Son     Diabetes Maternal Uncle         1/2 brother    Cancer Paternal Aunt     Early death Paternal Aunt     Leukemia Paternal Aunt         highschool    Breast cancer Paternal Aunt     Heart disease Paternal Uncle     Rheum arthritis Maternal Grandmother     Pneumonia Maternal Grandfather     Cancer Paternal Grandmother     Breast cancer Paternal Grandmother     Early death Paternal Grandfather          killed in line of duty        Social History:   Marital Status:   Occupation:retired    Alcohol History:  reports no history of alcohol use.  Tobacco History:  reports that she quit smoking about 48 years ago. Her smoking use included cigarettes. She has a 10.00 pack-year smoking history. She has never used smokeless tobacco.  Drug History:  reports no history of drug use.    Review of patient's allergies indicates:   Allergen Reactions    Amoxicillin-pot clavulanate Diarrhea and Other (See Comments)     Sever cramping    Diltiazem Other (See Comments)     Extreme dry moth    Fenofibrate Other (See Comments)     Other reaction(s): Itching    Hydroxychloroquine      Other reaction(s): eye accomodation problems    Leflunomide      Other reaction(s): abd pains    Lisinopril-hydrochlorothiazide      Other reaction(s): diarrhea  Other reaction(s): cramps    Methotrexate      Other reaction(s): pancreatitis    Norvasc [amlodipine] Swelling    Sulfa (sulfonamide antibiotics)      Other reaction(s): Swelling  Other reaction(s): Hives    Sulfamethoxazole-trimethoprim        Current Outpatient Medications   Medication Sig Dispense Refill    albuterol (PROAIR HFA) 90 mcg/actuation inhaler Inhale 2 puffs into the lungs every 6 (six) hours as needed for Wheezing. 3 Inhaler 5    artificial saliva, yerbas-lyt, SprA Use as directed 240 mL 11    aspirin 81 mg Tab Take 81 mg by mouth once daily. Every day      atenolol (TENORMIN) 100 MG tablet TAKE 1 TABLET DAILY 90 tablet 2    azelastine  (ASTELIN) 137 mcg (0.1 %) nasal spray 1 spray by Nasal route 2 (two) times daily.      beclomethasone (QVAR) 80 mcg/actuation Aero Inhale 1 puff into the lungs 2 (two) times daily. Controller      benzonatate (TESSALON) 200 MG capsule Take 200 mg by mouth 3 (three) times daily as needed for Cough.      CALCIUM CARBONATE/VITAMIN D3 (OS-ADDY 500 + D) 500-125 mg-unit Tab Take 1 tablet by mouth once daily. Twice a day      cetirizine (ZYRTEC) 10 MG tablet Take 10 mg by mouth once daily. Every day      cholecalciferol, vitamin D3, 5,000 unit capsule Take 5,000 Units by mouth once daily. 5 a week      cyanocobalamin (VITAMIN B-12) 100 MCG tablet Take 100 mcg by mouth once daily.      diclofenac sodium (VOLTAREN) 1 % Gel Apply 2 g topically once daily.      etanercept (ENBREL) 50 mg/mL (0.98 mL) injection 50 mg once a week. once weekly      fluticasone (FLONASE) 50 mcg/actuation nasal spray 2 sprays by Each Nare route once daily. Every day 48 g 3    magnesium oxide (MAG-OX) 400 mg tablet Take 400 mg by mouth once daily.      montelukast (SINGULAIR) 10 mg tablet TAKE 1 TABLET EVERY EVENING 90 tablet 3    omeprazole (PRILOSEC) 40 MG capsule TAKE 1 CAPSULE TWICE DAILY BEFORE MEALS 180 capsule 2    UBIDECARENONE (COQ-10 ORAL) Take 1 capsule by mouth once daily.       predniSONE (DELTASONE) 10 MG tablet Take 4 tabs x 2 days, then take 3 tabs x 2 days, then take 2 tabs x 2 days, then take 1 tab x 2 days. (Patient not taking: Reported on 3/5/2020) 20 tablet 0     No current facility-administered medications for this visit.        Last PFT: 12/2019 no obstruction, good response to bronchodilator, mild restriction, no diffusion defect     Last Ct of chest 3/2020 Impression:     Stable 8 mm fissural nodule in the right upper lobe unchanged from 06/10/2016, Stable chronic changes bilaterally with no acute pulmonary process    General: feeling well  Eyes: Vision is good.  ENT:  Congestion better   Heart:: No chest pain or  palpitations.  Lungs: dyspnea with walking and talking at same time, coughs every now and then but nothing like it used to be   GI: reflux controlled with Prilosec  : No dysuria, hesitancy, or nocturia.  Musculoskeletal: weather makes her joints hurt more  Skin: No lesions or rashes.  Neuro: No headaches or neuropathy.  Lymph: swelling to legs   Psych: No anxiety or depression.  Endo: weight gain     OBJECTIVE:      BP (!) 170/86 (BP Location: Left arm, Patient Position: Sitting, BP Method: Small (Manual)) Comment (BP Method): LT WRIST  Pulse 64   Temp 97.2 °F (36.2 °C)   Ht 5' (1.524 m)   Wt 98.9 kg (218 lb)   SpO2 98% Comment: 2LPM PD  BMI 42.58 kg/m²     Physical Exam  GENERAL: Older patient in no distress.  HEENT: Pupils equal and reactive. Extraocular movements intact. Nose intact.      Pharynx moist.  NECK: Supple.   HEART: Regular rate and rhythm. No murmur or gallop auscultated.  LUNGS:  crackles to bases  ABDOMEN: Bowel sounds present. Non-tender, no masses palpated.  EXTREMITIES: Normal muscle tone and joint movement, clubbed nails  LYMPHATICS: No adenopathy palpated, 1+ edema.  SKIN: Dry, intact, no lesions.   NEURO: Cranial nerves II-XII intact. Motor strength 5/5 bilaterally, upper and lower extremities.  PSYCH: Appropriate affect.    Assessment:       1. Mild persistent asthma without complication    2. DANE (obstructive sleep apnea)    3. Chronic hypoxemic respiratory failure    4. Pulmonary nodule        Nodule stable since 2016  Plan:           Use your Qvar 2 puffs twice a day every day  Continue the Singulair   Call if you get sick  Keep wearing your oxygen  Keep sleeping on your CPAP with oxygen bled in   You have to lose weight   Follow up in 6 months

## 2020-09-09 NOTE — PATIENT INSTRUCTIONS
Obstructive Sleep Apnea  Obstructive sleep apnea is a condition that causes your air passages to become narrowed or blocked during sleep. As a result, breathing stops for short periods. Your body wakes up enough for breathing to begin again, though you don't remember it. The cycle of stopped breathing and brief awakenings can repeat dozens of times a night. This prevents the body from getting to the deeper stages of sleep that are needed for good rest and may cause your body's oxygen level to fall.  Signs of sleep apnea include loud snoring, noisy breathing, and gasping sounds during sleep. Daytime symptoms include waking up tired after a full night's sleep, waking up with headaches, feeling very sleepy or falling asleep during the day, and having problems with memory or concentration.  Risk factors for sleep apnea include:  · Being overweight  · Being a man, or a woman in menopause  · Smoking  · Using alcohol or sedating medicines  · Having enlarged structures in the nose or throat  Home care  Lifestyle changes that can help treat snoring and sleep apnea include the following:  · If you are overweight, lose weight. Talk to your healthcare provider about a weight-loss plan for you.  · Avoid alcohol for 3 to 4 hours before bedtime. Avoid sedating medications. Ask your healthcare provider about the medicines you take.  · If you smoke, talk to your healthcare provider about ways to quit.  · Sleep on your side. This can help prevent gravity from pulling relaxed throat tissues into your breathing passages.  · If you have allergies or sinus problems that block your nose, ask your healthcare provider for help.  Follow-up care  Follow up with your healthcare provider, or as advised. A diagnosis of sleep apnea is made with a sleep study. Your healthcare provider can tell you more about this test.  When to seek medical advice  Sleep apnea can make you more likely to have certain health problems. These include high blood  pressure, heart attack, stroke, and sexual dysfunction. If you have sleep apnea, talk to your healthcare provider about the best treatments for you.  Date Last Reviewed: 4/1/2017  © 4430-4832 [employer]. [address] [place] [address], [place] [address]. All rights reserved. This information is not intended as a substitute for professional medical care. Always follow your healthcare professional's instructions.    Use your Qvar 2 puffs twice a day every day  Continue the Singulair   Call if you get sick  Keep wearing your oxygen  Keep sleeping on your CPAP with oxygen bled in   You have to lose weight   Follow up in 6 months

## 2020-09-29 ENCOUNTER — PATIENT MESSAGE (OUTPATIENT)
Dept: OTHER | Facility: OTHER | Age: 75
End: 2020-09-29

## 2020-10-05 ENCOUNTER — PATIENT MESSAGE (OUTPATIENT)
Dept: ADMINISTRATIVE | Facility: HOSPITAL | Age: 75
End: 2020-10-05

## 2020-10-21 ENCOUNTER — PATIENT OUTREACH (OUTPATIENT)
Dept: ADMINISTRATIVE | Facility: HOSPITAL | Age: 75
End: 2020-10-21

## 2020-10-21 NOTE — PROGRESS NOTES
Care Everywhere updates requested and reviewed.  Immunizations reconciled. Media reports reviewed.  Duplicate HM overrides and  orders removed.  Overdue HM topic chart audit and/or requested.  Overdue lab testing linked to upcoming lab appointments if applies.      Health Maintenance Due   Topic Date Due    Hepatitis C Screening  1945    Shingles Vaccine (1 of 2) 10/31/1994    Influenza Vaccine (1) 2020

## 2020-11-04 ENCOUNTER — OFFICE VISIT (OUTPATIENT)
Dept: FAMILY MEDICINE | Facility: CLINIC | Age: 75
End: 2020-11-04
Attending: FAMILY MEDICINE
Payer: MEDICARE

## 2020-11-04 VITALS
BODY MASS INDEX: 42.85 KG/M2 | TEMPERATURE: 97 F | OXYGEN SATURATION: 97 % | SYSTOLIC BLOOD PRESSURE: 130 MMHG | HEART RATE: 59 BPM | DIASTOLIC BLOOD PRESSURE: 82 MMHG | WEIGHT: 218.25 LBS | HEIGHT: 60 IN

## 2020-11-04 DIAGNOSIS — E78.5 HYPERLIPIDEMIA, UNSPECIFIED HYPERLIPIDEMIA TYPE: ICD-10-CM

## 2020-11-04 DIAGNOSIS — J45.31 MILD PERSISTENT ASTHMA WITH EXACERBATION: ICD-10-CM

## 2020-11-04 DIAGNOSIS — Z12.31 ENCOUNTER FOR SCREENING MAMMOGRAM FOR BREAST CANCER: ICD-10-CM

## 2020-11-04 DIAGNOSIS — I10 ESSENTIAL HYPERTENSION: ICD-10-CM

## 2020-11-04 DIAGNOSIS — R91.1 PULMONARY NODULE: ICD-10-CM

## 2020-11-04 DIAGNOSIS — Z78.0 MENOPAUSE: ICD-10-CM

## 2020-11-04 DIAGNOSIS — Z00.00 ENCOUNTER FOR PREVENTIVE HEALTH EXAMINATION: Primary | ICD-10-CM

## 2020-11-04 DIAGNOSIS — E66.01 MORBID OBESITY WITH BMI OF 40.0-44.9, ADULT: ICD-10-CM

## 2020-11-04 DIAGNOSIS — M54.16 LUMBAR RADICULITIS: ICD-10-CM

## 2020-11-04 DIAGNOSIS — R73.9 HYPERGLYCEMIA: ICD-10-CM

## 2020-11-04 DIAGNOSIS — M06.9 RHEUMATOID ARTHRITIS, INVOLVING UNSPECIFIED SITE, UNSPECIFIED WHETHER RHEUMATOID FACTOR PRESENT: ICD-10-CM

## 2020-11-04 PROBLEM — J06.9 VIRAL UPPER RESPIRATORY TRACT INFECTION: Status: RESOLVED | Noted: 2019-12-06 | Resolved: 2020-11-04

## 2020-11-04 PROCEDURE — 99214 OFFICE O/P EST MOD 30 MIN: CPT | Mod: S$PBB,,, | Performed by: FAMILY MEDICINE

## 2020-11-04 PROCEDURE — 99215 OFFICE O/P EST HI 40 MIN: CPT | Mod: PBBFAC,PO | Performed by: FAMILY MEDICINE

## 2020-11-04 PROCEDURE — 99999 PR PBB SHADOW E&M-EST. PATIENT-LVL V: CPT | Mod: PBBFAC,,, | Performed by: FAMILY MEDICINE

## 2020-11-04 PROCEDURE — 99214 PR OFFICE/OUTPT VISIT, EST, LEVL IV, 30-39 MIN: ICD-10-PCS | Mod: S$PBB,,, | Performed by: FAMILY MEDICINE

## 2020-11-04 PROCEDURE — 99999 PR PBB SHADOW E&M-EST. PATIENT-LVL V: ICD-10-PCS | Mod: PBBFAC,,, | Performed by: FAMILY MEDICINE

## 2020-11-04 RX ORDER — INFLUENZA A VIRUS A/MICHIGAN/45/2015 X-275 (H1N1) ANTIGEN (FORMALDEHYDE INACTIVATED), INFLUENZA A VIRUS A/SINGAPORE/INFIMH-16-0019/2016 IVR-186 (H3N2) ANTIGEN (FORMALDEHYDE INACTIVATED), INFLUENZA B VIRUS B/PHUKET/3073/2013 ANTIGEN (FORMALDEHYDE INACTIVATED), AND INFLUENZA B VIRUS B/MARYLAND/15/2016 BX-69A ANTIGEN (FORMALDEHYDE INACTIVATED) 60; 60; 60; 60 UG/.7ML; UG/.7ML; UG/.7ML; UG/.7ML
INJECTION, SUSPENSION INTRAMUSCULAR
COMMUNITY
Start: 2020-10-14 | End: 2020-11-18

## 2020-11-04 NOTE — PROGRESS NOTES
Subjective:       Patient ID: Ling Rapp is a 75 y.o. female.    Chief Complaint: Annual Exam    75-year-old female coming in for annual exam and follow-up of multiple medical problems.  She is having problems with the back pain for which she was seen by Dr. Johnson who sent her to Dr. Jenkins for epidural steroid injections.  She was supposed to return to see Dr. Johnson afterwards but was going on a trip and canceled the appointment saying she would call back to reschedule and never did.  She continues to have back pain.  She has a history of rheumatoid arthritis followed by Dr. Silvestre and is on methotrexate.  She has sleep apnea pulmonary nodules and moderate persistent asthma followed by Dr. francheska freitas, hypertension, hyperlipidemia and the lumbar radiculopathy.  She feels relatively well at this time but she does have intermittent shortness of breath and she does sleep on oxygen due to chronic hypoxia.    Past Medical History:  No date: ALLERGIC RHINITIS  No date: Anticoagulant long-term use  No date: Asthma  No date: Cataract  No date: Former smoker  No date: GERD (gastroesophageal reflux disease)  No date: Hemorrhoids  No date: Hypertension  No date: DANE (obstructive sleep apnea)  No date: Osteopenia  No date: Pancreatitis      Comment:  secondary to severe hypertriglyceridemia  No date: Peptic ulcer  No date: Rheumatoid arthritis  No date: Vitamin D deficiency    Past Surgical History:  No date: Athroscopy(knee)  2017: BREAST BIOPSY; Left      Comment:  benign  No date: CARDIAC CATHETERIZATION  2013: CATARACT EXTRACTION; Left  3/30/2016: COLONOSCOPY; N/A      Comment:  Procedure: COLONOSCOPY;  Surgeon: Thee Sorenson MD;  Location: [facility];  Service: Endoscopy;                 Laterality: N/A;  No date: EYE SURGERY      Comment:  cataracts bilateral   1997: FRACTURE SURGERY      Comment:  right knee   1982: HYSTERECTOMY  No date: PARTIAL HYSTERECTOMY      Comment:  w/ USO  No date:  TONSILLECTOMY  4/15/2019: TRANSFORAMINAL EPIDURAL INJECTION OF STEROID; Right      Comment:  Procedure: Injection,steroid,epidural,transforaminal                approach;  Surgeon: Fernando Jenkins MD;  Location: [facility] OR;                 Service: Pain Management;  Laterality: Right;  L4-5,                L5-S1  5/2013: UPPER GASTROINTESTINAL ENDOSCOPY    Review of patient's family history indicates:  Problem: Colon polyps      Relation: Sister          Age of Onset: 64  Problem: Ulcers      Relation: Father          Age of Onset: (Not Specified)  Problem: Emphysema      Relation: Father          Age of Onset: (Not Specified)          Comment: smoker  Problem: Alcohol abuse      Relation: Father          Age of Onset: (Not Specified)  Problem: Thyroid disease      Relation: Mother          Age of Onset: (Not Specified)  Problem: Heart disease      Relation: Mother          Age of Onset: (Not Specified)  Problem: Emphysema      Relation: Mother          Age of Onset: (Not Specified)          Comment: smoker  Problem: Alzheimer's disease      Relation: Brother          Age of Onset: (Not Specified)  Problem: Stroke      Relation: Brother          Age of Onset: (Not Specified)  Problem: Cancer      Relation: Brother          Age of Onset: (Not Specified)  Problem: Throat cancer      Relation: Brother          Age of Onset: (Not Specified)          Comment: non smoker  Problem: No Known Problems      Relation: Son          Age of Onset: (Not Specified)  Problem: Diabetes      Relation: Maternal Uncle          Age of Onset: (Not Specified)          Comment: 1/2 brother  Problem: Cancer      Relation: Paternal Aunt          Age of Onset: (Not Specified)  Problem: Early death      Relation: Paternal Aunt          Age of Onset: (Not Specified)  Problem: Leukemia      Relation: Paternal Aunt          Age of Onset: (Not Specified)          Comment: highschool  Problem: Breast cancer      Relation: Paternal Aunt          Age of  Onset: (Not Specified)  Problem: Heart disease      Relation: Paternal Uncle          Age of Onset: (Not Specified)  Problem: Rheum arthritis      Relation: Maternal Grandmother          Age of Onset: (Not Specified)  Problem: Pneumonia      Relation: Maternal Grandfather          Age of Onset: (Not Specified)  Problem: Cancer      Relation: Paternal Grandmother          Age of Onset: (Not Specified)  Problem: Breast cancer      Relation: Paternal Grandmother          Age of Onset: (Not Specified)  Problem: Early death      Relation: Paternal Grandfather          Age of Onset: (Not Specified)          Comment:  killed in line of duty    Social History    Tobacco Use      Smoking status: Former Smoker        Packs/day: 1.00        Years: 10.00        Pack years: 10        Types: Cigarettes        Quit date: 1972        Years since quittin.8      Smokeless tobacco: Never Used      Tobacco comment: quit     Alcohol use: No      Frequency: Never      Binge frequency: Never    Drug use: No    Current Outpatient Medications on File Prior to Visit:  albuterol (PROAIR HFA) 90 mcg/actuation inhaler, Inhale 2 puffs into the lungs every 6 (six) hours as needed for Wheezing., Disp: 3 Inhaler, Rfl: 5  artificial saliva, yerbas-lyt, SprA, Use as directed, Disp: 240 mL, Rfl: 11  aspirin 81 mg Tab, Take 81 mg by mouth once daily. Every day, Disp: , Rfl:   atenolol (TENORMIN) 100 MG tablet, TAKE 1 TABLET DAILY, Disp: 90 tablet, Rfl: 2  azelastine (ASTELIN) 137 mcg (0.1 %) nasal spray, 1 spray by Nasal route 2 (two) times daily., Disp: , Rfl:   beclomethasone (QVAR) 80 mcg/actuation Aero, Inhale 1 puff into the lungs 2 (two) times daily. Controller, Disp: , Rfl:   benzonatate (TESSALON) 200 MG capsule, Take 200 mg by mouth 3 (three) times daily as needed for Cough., Disp: , Rfl:   CALCIUM CARBONATE/VITAMIN D3 (OS-ADDY 500 + D) 500-125 mg-unit Tab, Take 1 tablet by mouth once daily. Twice a day, Disp: , Rfl:   cetirizine  (ZYRTEC) 10 MG tablet, Take 10 mg by mouth once daily. Every day, Disp: , Rfl:   cholecalciferol, vitamin D3, 5,000 unit capsule, Take 5,000 Units by mouth once daily. 5 a week, Disp: , Rfl:   cyanocobalamin (VITAMIN B-12) 100 MCG tablet, Take 100 mcg by mouth once daily., Disp: , Rfl:   diclofenac sodium (VOLTAREN) 1 % Gel, Apply 2 g topically once daily., Disp: , Rfl:   etanercept (ENBREL) 50 mg/mL (0.98 mL) injection, 50 mg once a week. once weekly, Disp: , Rfl:   fluticasone (FLONASE) 50 mcg/actuation nasal spray, 2 sprays by Each Nare route once daily. Every day, Disp: 48 g, Rfl: 3  FLUZONE HIGHDOSE QUAD 20-21  mcg/0.7 mL Syrg, PHARMACY ADMINISTERED, Disp: , Rfl:   magnesium oxide (MAG-OX) 400 mg tablet, Take 400 mg by mouth once daily., Disp: , Rfl:   montelukast (SINGULAIR) 10 mg tablet, TAKE 1 TABLET EVERY EVENING, Disp: 90 tablet, Rfl: 3  omeprazole (PRILOSEC) 40 MG capsule, TAKE 1 CAPSULE TWICE DAILY BEFORE MEALS, Disp: 180 capsule, Rfl: 2  UBIDECARENONE (COQ-10 ORAL), Take 1 capsule by mouth once daily. , Disp: , Rfl:   (DISCONTINUED) amlodipine (NORVASC) 5 MG tablet, Take 5 mg by mouth once daily. , Disp: , Rfl:   (DISCONTINUED) predniSONE (DELTASONE) 10 MG tablet, Take 4 tabs x 2 days, then take 3 tabs x 2 days, then take 2 tabs x 2 days, then take 1 tab x 2 days. (Patient not taking: Reported on 3/5/2020), Disp: 20 tablet, Rfl: 0    No current facility-administered medications on file prior to visit.     Hepatitis C Screening due on 1945  Shingles Vaccine(1 of 2) due on 10/31/1994      Review of Systems   Constitutional: Negative for chills, diaphoresis, fatigue, fever and unexpected weight change.   HENT: Negative for congestion, ear pain, hearing loss, postnasal drip and sinus pressure.    Eyes: Negative for itching and visual disturbance.   Respiratory: Positive for shortness of breath. Negative for cough, chest tightness and wheezing.    Cardiovascular: Negative for chest pain,  palpitations and leg swelling.   Gastrointestinal: Negative for abdominal pain, blood in stool, constipation, diarrhea, nausea and vomiting.   Genitourinary: Negative for dysuria, frequency and hematuria.   Musculoskeletal: Positive for back pain. Negative for arthralgias, joint swelling and myalgias.   Neurological: Negative for dizziness and headaches.   Hematological: Negative for adenopathy.   Psychiatric/Behavioral: Negative for sleep disturbance. The patient is not nervous/anxious.        Objective:      Physical Exam  Vitals signs and nursing note reviewed.   Constitutional:       General: She is not in acute distress.     Appearance: Normal appearance. She is well-developed. She is obese. She is not ill-appearing, toxic-appearing or diaphoretic.      Comments: Good blood pressure control  Morbid obesity with a BMI of 42.6 she is up 13 lb from her last visit with me May 10, 2019   HENT:      Head: Normocephalic and atraumatic.      Right Ear: Tympanic membrane, ear canal and external ear normal. There is no impacted cerumen.      Left Ear: Tympanic membrane, ear canal and external ear normal. There is no impacted cerumen.      Nose: Nose normal. No congestion or rhinorrhea.      Mouth/Throat:      Mouth: Mucous membranes are moist.      Pharynx: Oropharynx is clear. No oropharyngeal exudate or posterior oropharyngeal erythema.   Eyes:      General: No scleral icterus.        Right eye: No discharge.         Left eye: No discharge.      Conjunctiva/sclera: Conjunctivae normal.      Pupils: Pupils are equal, round, and reactive to light.   Neck:      Musculoskeletal: Normal range of motion and neck supple. No neck rigidity or muscular tenderness.      Thyroid: No thyromegaly.      Vascular: No carotid bruit or JVD.   Cardiovascular:      Rate and Rhythm: Normal rate and regular rhythm.      Heart sounds: Normal heart sounds. No murmur. No friction rub. No gallop.    Pulmonary:      Effort: Pulmonary effort is  normal. No respiratory distress.      Breath sounds: Normal breath sounds. No stridor. No wheezing, rhonchi or rales.   Chest:      Chest wall: No tenderness.   Abdominal:      General: Bowel sounds are normal. There is no distension.      Palpations: Abdomen is soft. There is no mass.      Tenderness: There is no abdominal tenderness. There is no guarding or rebound.      Hernia: No hernia is present.   Musculoskeletal: Normal range of motion.         General: No swelling, tenderness, deformity or signs of injury.      Right lower leg: No edema.      Left lower leg: No edema.   Lymphadenopathy:      Cervical: No cervical adenopathy.   Skin:     General: Skin is warm and dry.      Coloration: Skin is not jaundiced or pale.      Findings: No bruising, erythema, lesion or rash.   Neurological:      General: No focal deficit present.      Mental Status: She is alert and oriented to person, place, and time. Mental status is at baseline.      Cranial Nerves: No cranial nerve deficit.      Sensory: No sensory deficit.      Motor: No weakness.      Coordination: Coordination normal.      Gait: Gait normal.      Deep Tendon Reflexes: Reflexes are normal and symmetric. Reflexes normal.   Psychiatric:         Mood and Affect: Mood normal.         Behavior: Behavior normal.         Thought Content: Thought content normal.         Judgment: Judgment normal.         Assessment:       1. Encounter for preventive health examination    2. Lumbar radiculitis    3. Mild persistent asthma with exacerbation    4. Pulmonary nodule    5. Essential hypertension    6. Hyperlipidemia, unspecified hyperlipidemia type    7. Hyperglycemia    8. Rheumatoid arthritis, involving unspecified site, unspecified whether rheumatoid factor present    9. Encounter for screening mammogram for breast cancer    10. Menopause    11. Morbid obesity with BMI of 40.0-44.9, adult        Plan:       1. Encounter for preventive health examination    2. Lumbar  radiculitis  Recommend return and follow-up with Dr. Johnson as requested    3. Mild persistent asthma with exacerbation  Followed by Dr. Sanon    4. Pulmonary nodule  Followed by Dr. Perdomo    5. Essential hypertension  Good control with no changes needed    6. Hyperlipidemia, unspecified hyperlipidemia type  Last two cholesterol levels were both satisfactory with the exception of a mild elevation of the triglycerides.  Will forego retesting this year    7. Hyperglycemia    8. Rheumatoid arthritis, involving unspecified site, unspecified whether rheumatoid factor present  Followed by Dr. Silvestre    9. Encounter for screening mammogram for breast cancer  To be scheduled  - Mammo Digital Screening Bilat; Future    10. Menopause  To be scheduled  - DXA Bone Density Spine And Hip; Future    11. Morbid obesity with BMI of 40.0-44.9, adult

## 2020-11-18 ENCOUNTER — OFFICE VISIT (OUTPATIENT)
Dept: RHEUMATOLOGY | Facility: CLINIC | Age: 75
End: 2020-11-18
Payer: MEDICARE

## 2020-11-18 VITALS
DIASTOLIC BLOOD PRESSURE: 98 MMHG | BODY MASS INDEX: 42.18 KG/M2 | TEMPERATURE: 98 F | WEIGHT: 216 LBS | SYSTOLIC BLOOD PRESSURE: 154 MMHG

## 2020-11-18 DIAGNOSIS — M05.79 RHEUMATOID ARTHRITIS INVOLVING MULTIPLE SITES WITH POSITIVE RHEUMATOID FACTOR: Primary | ICD-10-CM

## 2020-11-18 PROCEDURE — 99213 OFFICE O/P EST LOW 20 MIN: CPT | Mod: S$GLB,,, | Performed by: INTERNAL MEDICINE

## 2020-11-18 PROCEDURE — 99213 PR OFFICE/OUTPT VISIT, EST, LEVL III, 20-29 MIN: ICD-10-PCS | Mod: S$GLB,,, | Performed by: INTERNAL MEDICINE

## 2020-11-18 NOTE — PROGRESS NOTES
[facility] RHEUMATOLOGY            PROGRESS NOTE      Subjective:       Patient ID:   NAME: Ling Rapp : 1945     75 y.o. female    Referring Doc: No ref. provider found  Other Physicians:    Chief Complaint:  Rheumatoid Arthritis      History of Present Illness:     Patient returns today for a regularly scheduled follow-up visit for  RA     The patient is doing well  No fevers,CP,no SOB  NoGI complaints  No known exposure to COVID  No prolonged AM stiffness or joint swelling  Mild arthralgias          ROS:   GEN:  No  fever, night sweats . weight is stable   = sl fatigue  SKIN: no rashes, no bruising, no ulcerations, no Raynaud's  HEENT: no HA's, No visual changes, no mucosal ulcers, no sicca symptoms,  CV:   no CP, SOB, PND, SEN, no orthopnea, no palpitations  PULM: normal with no SOB, cough, hemoptysis, sputum or pleuritic pain  GI:  no abdominal pain, nausea, vomiting, constipation, diarrhea, melanotic stools, BRBPR, hematemesis, no dysphagia  :   no dysuria  NEURO: no paresthesias, headaches, visual disturbances, muscle weakness  MUSCULOSKELETAL:no joint swelling, prolonged AM stiffness, no back pain, no muscle pain  Allergies:  Review of patient's allergies indicates:   Allergen Reactions    Amoxicillin-pot clavulanate Diarrhea and Other (See Comments)     Sever cramping    Diltiazem Other (See Comments)     Extreme dry moth    Fenofibrate Other (See Comments)     Other reaction(s): Itching    Hydroxychloroquine      Other reaction(s): eye accomodation problems    Leflunomide      Other reaction(s): abd pains    Lisinopril-hydrochlorothiazide      Other reaction(s): diarrhea  Other reaction(s): cramps    Methotrexate      Other reaction(s): pancreatitis    Norvasc [amlodipine] Swelling    Sulfa (sulfonamide antibiotics)      Other reaction(s): Swelling  Other reaction(s): Hives    Sulfamethoxazole-trimethoprim        Medications:    Current Outpatient Medications:     albuterol  (PROAIR HFA) 90 mcg/actuation inhaler, Inhale 2 puffs into the lungs every 6 (six) hours as needed for Wheezing., Disp: 3 Inhaler, Rfl: 5    artificial saliva, yerbas-lyt, SprA, Use as directed, Disp: 240 mL, Rfl: 11    aspirin 81 mg Tab, Take 81 mg by mouth once daily. Every day, Disp: , Rfl:     atenolol (TENORMIN) 100 MG tablet, TAKE 1 TABLET DAILY, Disp: 90 tablet, Rfl: 2    azelastine (ASTELIN) 137 mcg (0.1 %) nasal spray, 1 spray by Nasal route 2 (two) times daily., Disp: , Rfl:     beclomethasone (QVAR) 80 mcg/actuation Aero, Inhale 1 puff into the lungs 2 (two) times daily. Controller, Disp: , Rfl:     benzonatate (TESSALON) 200 MG capsule, Take 200 mg by mouth 3 (three) times daily as needed for Cough., Disp: , Rfl:     CALCIUM CARBONATE/VITAMIN D3 (OS-ADDY 500 + D) 500-125 mg-unit Tab, Take 1 tablet by mouth once daily. Twice a day, Disp: , Rfl:     cetirizine (ZYRTEC) 10 MG tablet, Take 10 mg by mouth once daily. Every day, Disp: , Rfl:     cholecalciferol, vitamin D3, 5,000 unit capsule, Take 5,000 Units by mouth once daily. 5 a week, Disp: , Rfl:     cyanocobalamin (VITAMIN B-12) 100 MCG tablet, Take 100 mcg by mouth once daily., Disp: , Rfl:     diclofenac sodium (VOLTAREN) 1 % Gel, Apply 2 g topically once daily., Disp: , Rfl:     etanercept (ENBREL) 50 mg/mL (0.98 mL) injection, 50 mg once a week. once weekly, Disp: , Rfl:     fluticasone (FLONASE) 50 mcg/actuation nasal spray, 2 sprays by Each Nare route once daily. Every day, Disp: 48 g, Rfl: 3    magnesium oxide (MAG-OX) 400 mg tablet, Take 400 mg by mouth once daily., Disp: , Rfl:     montelukast (SINGULAIR) 10 mg tablet, TAKE 1 TABLET EVERY EVENING, Disp: 90 tablet, Rfl: 3    omeprazole (PRILOSEC) 40 MG capsule, TAKE 1 CAPSULE TWICE DAILY BEFORE MEALS, Disp: 180 capsule, Rfl: 2    UBIDECARENONE (COQ-10 ORAL), Take 1 capsule by mouth once daily. , Disp: , Rfl:     PMHx/PSHx Updates:        Objective:     Vitals:  Blood pressure  (!) 154/98, temperature 97.5 °F (36.4 °C), weight 98 kg (216 lb).    Physical Examination:   GEN: no apparent distress, comfortable; AAOx3  SKIN: no rashes,no ulceration, no Raynaud's, no petechiae, no SQ nodules,  HEAD: normal  EYES: no pallor, no icterus,  NECK: no masses, thyroid normal, trachea midline, no LAD/LN's, supple  CV: RRR with no murmur; l S1 and S2 reg. ,no gallop no rubs,   CHEST: Normal respiratory effort; CTAB; normal breath sounds; no wheeze or crackles  MUSC/Skeletal: ROM normal; no crepitus; joints without synovitis,  + Irvin's,Heberden's deformities  No joint swelling or tenderness of PIP, MCP, wrist, elbow, shoulder, or knee joints  EXTREM: no clubbing, cyanosis, no edema,normal  pulses   NEURO: grossly intact; motor WNL; AAOx3; ,  PSYCH: normal mood, affect and behavior  LYMPH: normal cervical, supraclavicular          Labs:   Lab Results   Component Value Date    WBC 8.69 09/09/2020    HGB 13.5 09/09/2020    HCT 42.8 09/09/2020    MCV 96 09/09/2020     09/09/2020    CMP  @LASTLAB(NA,K,CL,CO2,GLU,BUN,Creatinine,Calcium,PROT,Albumin,Bilitot,Alkphos,AST,ALT,CRP,ESR,RF,CCP,VANNA,SSA,CPK,uric acid) )@  I have reviewed all available lab results and radiology reports.    Radiology/Diagnostic Studies:        Assessment/Plan:   (1) 75 y.o. female with diagnosis of RA stable  Labs ok  FU in 4 month                Discussion:     I have explained all of the above in detail and the patient understands all of the current recommendation(s). I have answered all questions to the best of my ability and to their complete satisfaction.       The patient is to continue with the current management plan         RTC in         Electronically signed by Manjinder Schultz MD

## 2020-12-11 ENCOUNTER — HOSPITAL ENCOUNTER (OUTPATIENT)
Dept: RADIOLOGY | Facility: CLINIC | Age: 75
Discharge: HOME | End: 2020-12-11
Attending: FAMILY MEDICINE
Payer: MEDICARE

## 2020-12-11 ENCOUNTER — PATIENT MESSAGE (OUTPATIENT)
Dept: OTHER | Facility: OTHER | Age: 75
End: 2020-12-11

## 2020-12-11 DIAGNOSIS — Z78.0 MENOPAUSE: ICD-10-CM

## 2020-12-11 DIAGNOSIS — Z12.31 ENCOUNTER FOR SCREENING MAMMOGRAM FOR BREAST CANCER: ICD-10-CM

## 2020-12-11 PROCEDURE — 77067 SCR MAMMO BI INCL CAD: CPT | Mod: 26,,, | Performed by: RADIOLOGY

## 2020-12-11 PROCEDURE — 77067 MAMMO DIGITAL SCREENING BILAT WITH TOMO: ICD-10-PCS | Mod: 26,,, | Performed by: RADIOLOGY

## 2020-12-11 PROCEDURE — 77080 DEXA BONE DENSITY SPINE HIP: ICD-10-PCS | Mod: 26,,, | Performed by: RADIOLOGY

## 2020-12-11 PROCEDURE — 77080 DXA BONE DENSITY AXIAL: CPT | Mod: 26,,, | Performed by: RADIOLOGY

## 2020-12-11 PROCEDURE — 77063 MAMMO DIGITAL SCREENING BILAT WITH TOMO: ICD-10-PCS | Mod: 26,,, | Performed by: RADIOLOGY

## 2020-12-11 PROCEDURE — 77067 SCR MAMMO BI INCL CAD: CPT | Mod: TC,PO

## 2020-12-11 PROCEDURE — 77063 BREAST TOMOSYNTHESIS BI: CPT | Mod: 26,,, | Performed by: RADIOLOGY

## 2020-12-11 PROCEDURE — 77080 DXA BONE DENSITY AXIAL: CPT | Mod: TC,PO

## 2021-02-05 ENCOUNTER — IMMUNIZATION (OUTPATIENT)
Dept: PRIMARY CARE CLINIC | Facility: CLINIC | Age: 76
End: 2021-02-05
Payer: MEDICARE

## 2021-02-05 DIAGNOSIS — Z23 NEED FOR VACCINATION: Primary | ICD-10-CM

## 2021-02-05 PROCEDURE — 91300 COVID-19, MRNA, LNP-S, PF, 30 MCG/0.3 ML DOSE VACCINE: CPT | Mod: S$GLB,,, | Performed by: FAMILY MEDICINE

## 2021-02-05 PROCEDURE — 0001A COVID-19, MRNA, LNP-S, PF, 30 MCG/0.3 ML DOSE VACCINE: CPT | Mod: S$GLB,,, | Performed by: FAMILY MEDICINE

## 2021-02-05 PROCEDURE — 0001A COVID-19, MRNA, LNP-S, PF, 30 MCG/0.3 ML DOSE VACCINE: ICD-10-PCS | Mod: S$GLB,,, | Performed by: FAMILY MEDICINE

## 2021-02-05 PROCEDURE — 91300 COVID-19, MRNA, LNP-S, PF, 30 MCG/0.3 ML DOSE VACCINE: ICD-10-PCS | Mod: S$GLB,,, | Performed by: FAMILY MEDICINE

## 2021-02-26 ENCOUNTER — IMMUNIZATION (OUTPATIENT)
Dept: PRIMARY CARE CLINIC | Facility: CLINIC | Age: 76
End: 2021-02-26
Payer: MEDICARE

## 2021-02-26 DIAGNOSIS — Z23 NEED FOR VACCINATION: Primary | ICD-10-CM

## 2021-02-26 PROCEDURE — 0002A COVID-19, MRNA, LNP-S, PF, 30 MCG/0.3 ML DOSE VACCINE: ICD-10-PCS | Mod: S$GLB,,, | Performed by: FAMILY MEDICINE

## 2021-02-26 PROCEDURE — 91300 COVID-19, MRNA, LNP-S, PF, 30 MCG/0.3 ML DOSE VACCINE: ICD-10-PCS | Mod: S$GLB,,, | Performed by: FAMILY MEDICINE

## 2021-02-26 PROCEDURE — 0002A COVID-19, MRNA, LNP-S, PF, 30 MCG/0.3 ML DOSE VACCINE: CPT | Mod: S$GLB,,, | Performed by: FAMILY MEDICINE

## 2021-02-26 PROCEDURE — 91300 COVID-19, MRNA, LNP-S, PF, 30 MCG/0.3 ML DOSE VACCINE: CPT | Mod: S$GLB,,, | Performed by: FAMILY MEDICINE

## 2021-03-11 ENCOUNTER — OFFICE VISIT (OUTPATIENT)
Dept: PULMONOLOGY | Facility: CLINIC | Age: 76
End: 2021-03-11
Payer: MEDICARE

## 2021-03-11 VITALS
BODY MASS INDEX: 42.8 KG/M2 | DIASTOLIC BLOOD PRESSURE: 76 MMHG | HEART RATE: 68 BPM | HEIGHT: 60 IN | SYSTOLIC BLOOD PRESSURE: 172 MMHG | WEIGHT: 218 LBS | OXYGEN SATURATION: 94 % | TEMPERATURE: 98 F

## 2021-03-11 DIAGNOSIS — M06.9 RHEUMATOID ARTHRITIS, INVOLVING UNSPECIFIED SITE, UNSPECIFIED WHETHER RHEUMATOID FACTOR PRESENT: ICD-10-CM

## 2021-03-11 DIAGNOSIS — J45.31 MILD PERSISTENT ASTHMA WITH EXACERBATION: Primary | ICD-10-CM

## 2021-03-11 DIAGNOSIS — J84.9 INTERSTITIAL PULMONARY DISEASE, UNSPECIFIED: ICD-10-CM

## 2021-03-11 DIAGNOSIS — G47.33 OSA (OBSTRUCTIVE SLEEP APNEA): ICD-10-CM

## 2021-03-11 DIAGNOSIS — J96.11 CHRONIC HYPOXEMIC RESPIRATORY FAILURE: ICD-10-CM

## 2021-03-11 PROCEDURE — 99214 OFFICE O/P EST MOD 30 MIN: CPT | Mod: S$GLB,,, | Performed by: NURSE PRACTITIONER

## 2021-03-11 PROCEDURE — 99214 PR OFFICE/OUTPT VISIT, EST, LEVL IV, 30-39 MIN: ICD-10-PCS | Mod: S$GLB,,, | Performed by: NURSE PRACTITIONER

## 2021-03-11 RX ORDER — FLUTICASONE FUROATE AND VILANTEROL TRIFENATATE 100; 25 UG/1; UG/1
1 POWDER RESPIRATORY (INHALATION) DAILY
Qty: 3 EACH | Refills: 6 | Status: SHIPPED | OUTPATIENT
Start: 2021-03-11 | End: 2022-07-25

## 2021-04-08 ENCOUNTER — HOSPITAL ENCOUNTER (OUTPATIENT)
Dept: RADIOLOGY | Facility: HOSPITAL | Age: 76
Discharge: HOME | End: 2021-04-08
Attending: NURSE PRACTITIONER
Payer: MEDICARE

## 2021-04-08 ENCOUNTER — TELEPHONE (OUTPATIENT)
Dept: PULMONOLOGY | Facility: CLINIC | Age: 76
End: 2021-04-08

## 2021-04-08 ENCOUNTER — HOSPITAL ENCOUNTER (OUTPATIENT)
Dept: PULMONOLOGY | Facility: HOSPITAL | Age: 76
Discharge: HOME | End: 2021-04-08
Attending: NURSE PRACTITIONER
Payer: MEDICARE

## 2021-04-08 DIAGNOSIS — M06.9 RHEUMATOID ARTHRITIS, INVOLVING UNSPECIFIED SITE, UNSPECIFIED WHETHER RHEUMATOID FACTOR PRESENT: ICD-10-CM

## 2021-04-08 DIAGNOSIS — J84.9 INTERSTITIAL PULMONARY DISEASE, UNSPECIFIED: ICD-10-CM

## 2021-04-08 DIAGNOSIS — J45.31 MILD PERSISTENT ASTHMA WITH EXACERBATION: ICD-10-CM

## 2021-04-08 PROCEDURE — 71250 CT THORAX DX C-: CPT | Mod: TC

## 2021-04-08 PROCEDURE — 94727 GAS DIL/WSHOT DETER LNG VOL: CPT

## 2021-04-08 PROCEDURE — 94010 BREATHING CAPACITY TEST: CPT

## 2021-04-08 PROCEDURE — 94729 DIFFUSING CAPACITY: CPT

## 2021-04-14 ENCOUNTER — TELEPHONE (OUTPATIENT)
Dept: PULMONOLOGY | Facility: CLINIC | Age: 76
End: 2021-04-14

## 2021-05-03 ENCOUNTER — OFFICE VISIT (OUTPATIENT)
Dept: RHEUMATOLOGY | Facility: CLINIC | Age: 76
End: 2021-05-03
Payer: MEDICARE

## 2021-05-03 ENCOUNTER — LAB VISIT (OUTPATIENT)
Dept: LAB | Facility: HOSPITAL | Age: 76
End: 2021-05-03
Attending: INTERNAL MEDICINE
Payer: MEDICARE

## 2021-05-03 VITALS — BODY MASS INDEX: 42.4 KG/M2 | SYSTOLIC BLOOD PRESSURE: 134 MMHG | WEIGHT: 217.13 LBS | DIASTOLIC BLOOD PRESSURE: 74 MMHG

## 2021-05-03 DIAGNOSIS — M05.79 RHEUMATOID ARTHRITIS INVOLVING MULTIPLE SITES WITH POSITIVE RHEUMATOID FACTOR: Primary | ICD-10-CM

## 2021-05-03 DIAGNOSIS — M05.79 RHEUMATOID ARTHRITIS INVOLVING MULTIPLE SITES WITH POSITIVE RHEUMATOID FACTOR: ICD-10-CM

## 2021-05-03 PROCEDURE — 86480 TB TEST CELL IMMUN MEASURE: CPT | Performed by: INTERNAL MEDICINE

## 2021-05-03 PROCEDURE — 36415 COLL VENOUS BLD VENIPUNCTURE: CPT | Performed by: INTERNAL MEDICINE

## 2021-05-03 PROCEDURE — 99213 OFFICE O/P EST LOW 20 MIN: CPT | Mod: S$GLB,,, | Performed by: INTERNAL MEDICINE

## 2021-05-03 PROCEDURE — 99213 PR OFFICE/OUTPT VISIT, EST, LEVL III, 20-29 MIN: ICD-10-PCS | Mod: S$GLB,,, | Performed by: INTERNAL MEDICINE

## 2021-05-05 LAB
GAMMA INTERFERON BACKGROUND BLD IA-ACNC: 0.02 IU/ML
M TB IFN-G CD4+ BCKGRND COR BLD-ACNC: 0.01 IU/ML
MITOGEN IGNF BCKGRD COR BLD-ACNC: 7.14 IU/ML
TB GOLD PLUS: NEGATIVE
TB2 - NIL: 0.01 IU/ML

## 2021-07-26 DIAGNOSIS — J45.909 ASTHMA, UNSPECIFIED ASTHMA SEVERITY, UNSPECIFIED WHETHER COMPLICATED, UNSPECIFIED WHETHER PERSISTENT: ICD-10-CM

## 2021-07-26 RX ORDER — MONTELUKAST SODIUM 10 MG/1
10 TABLET ORAL NIGHTLY
Qty: 90 TABLET | Refills: 3 | Status: SHIPPED | OUTPATIENT
Start: 2021-07-26 | End: 2022-07-15 | Stop reason: SDUPTHER

## 2021-08-06 ENCOUNTER — TELEPHONE (OUTPATIENT)
Dept: FAMILY MEDICINE | Facility: CLINIC | Age: 76
End: 2021-08-06

## 2021-08-06 DIAGNOSIS — Z12.31 BREAST CANCER SCREENING BY MAMMOGRAM: Primary | ICD-10-CM

## 2021-08-16 ENCOUNTER — HOSPITAL ENCOUNTER (OUTPATIENT)
Dept: RADIOLOGY | Facility: HOSPITAL | Age: 76
Discharge: HOME | End: 2021-08-16
Attending: NURSE PRACTITIONER
Payer: MEDICARE

## 2021-08-16 ENCOUNTER — TELEPHONE (OUTPATIENT)
Dept: PULMONOLOGY | Facility: CLINIC | Age: 76
End: 2021-08-16

## 2021-08-16 ENCOUNTER — OFFICE VISIT (OUTPATIENT)
Dept: PULMONOLOGY | Facility: CLINIC | Age: 76
End: 2021-08-16
Payer: MEDICARE

## 2021-08-16 VITALS
BODY MASS INDEX: 41.98 KG/M2 | HEIGHT: 60 IN | OXYGEN SATURATION: 93 % | HEART RATE: 86 BPM | DIASTOLIC BLOOD PRESSURE: 90 MMHG | WEIGHT: 213.81 LBS | SYSTOLIC BLOOD PRESSURE: 190 MMHG

## 2021-08-16 DIAGNOSIS — R05.9 COUGH: ICD-10-CM

## 2021-08-16 DIAGNOSIS — G47.33 OSA (OBSTRUCTIVE SLEEP APNEA): ICD-10-CM

## 2021-08-16 DIAGNOSIS — J45.40 MODERATE PERSISTENT ASTHMA WITHOUT COMPLICATION: ICD-10-CM

## 2021-08-16 DIAGNOSIS — R05.9 COUGH: Primary | ICD-10-CM

## 2021-08-16 DIAGNOSIS — J96.11 CHRONIC HYPOXEMIC RESPIRATORY FAILURE: ICD-10-CM

## 2021-08-16 PROCEDURE — 99214 OFFICE O/P EST MOD 30 MIN: CPT | Mod: S$GLB,,, | Performed by: NURSE PRACTITIONER

## 2021-08-16 PROCEDURE — 71046 X-RAY EXAM CHEST 2 VIEWS: CPT | Mod: TC

## 2021-08-16 PROCEDURE — 99214 PR OFFICE/OUTPT VISIT, EST, LEVL IV, 30-39 MIN: ICD-10-PCS | Mod: S$GLB,,, | Performed by: NURSE PRACTITIONER

## 2021-08-16 RX ORDER — BUDESONIDE 0.5 MG/2ML
0.5 INHALANT ORAL 2 TIMES DAILY
Qty: 120 ML | Refills: 5 | Status: SHIPPED | OUTPATIENT
Start: 2021-08-16 | End: 2021-11-15

## 2021-08-16 RX ORDER — DOXYCYCLINE HYCLATE 100 MG
100 TABLET ORAL 2 TIMES DAILY
Qty: 14 TABLET | Refills: 0 | Status: SHIPPED | OUTPATIENT
Start: 2021-08-16 | End: 2021-11-15

## 2021-08-16 RX ORDER — BENZONATATE 200 MG/1
200 CAPSULE ORAL 3 TIMES DAILY PRN
Qty: 90 CAPSULE | Refills: 3 | Status: SHIPPED | OUTPATIENT
Start: 2021-08-16 | End: 2021-08-26

## 2021-08-16 RX ORDER — BUDESONIDE 0.5 MG/2ML
0.5 INHALANT ORAL 2 TIMES DAILY
Qty: 120 ML | Refills: 5 | Status: SHIPPED | OUTPATIENT
Start: 2021-08-16 | End: 2021-08-16

## 2021-08-18 ENCOUNTER — TELEPHONE (OUTPATIENT)
Dept: PULMONOLOGY | Facility: CLINIC | Age: 76
End: 2021-08-18

## 2021-08-19 ENCOUNTER — PATIENT MESSAGE (OUTPATIENT)
Dept: PULMONOLOGY | Facility: CLINIC | Age: 76
End: 2021-08-19

## 2021-08-21 RX ORDER — PREDNISONE 10 MG/1
TABLET ORAL
Qty: 20 TABLET | Refills: 0 | Status: SHIPPED | OUTPATIENT
Start: 2021-08-21 | End: 2021-11-15

## 2021-08-27 ENCOUNTER — HOSPITAL ENCOUNTER (OUTPATIENT)
Dept: RADIOLOGY | Facility: HOSPITAL | Age: 76
Discharge: HOME | End: 2021-08-27
Attending: NURSE PRACTITIONER
Payer: MEDICARE

## 2021-08-27 ENCOUNTER — TELEPHONE (OUTPATIENT)
Dept: PULMONOLOGY | Facility: CLINIC | Age: 76
End: 2021-08-27

## 2021-08-27 ENCOUNTER — OFFICE VISIT (OUTPATIENT)
Dept: PULMONOLOGY | Facility: CLINIC | Age: 76
End: 2021-08-27
Payer: MEDICARE

## 2021-08-27 VITALS
WEIGHT: 212 LBS | HEART RATE: 61 BPM | DIASTOLIC BLOOD PRESSURE: 75 MMHG | OXYGEN SATURATION: 97 % | HEIGHT: 60 IN | SYSTOLIC BLOOD PRESSURE: 170 MMHG | BODY MASS INDEX: 41.62 KG/M2

## 2021-08-27 DIAGNOSIS — G47.33 OSA (OBSTRUCTIVE SLEEP APNEA): ICD-10-CM

## 2021-08-27 DIAGNOSIS — J18.9 PNEUMONIA DUE TO INFECTIOUS ORGANISM, UNSPECIFIED LATERALITY, UNSPECIFIED PART OF LUNG: ICD-10-CM

## 2021-08-27 DIAGNOSIS — J18.9 PNEUMONIA DUE TO INFECTIOUS ORGANISM, UNSPECIFIED LATERALITY, UNSPECIFIED PART OF LUNG: Primary | ICD-10-CM

## 2021-08-27 DIAGNOSIS — J96.11 CHRONIC HYPOXEMIC RESPIRATORY FAILURE: ICD-10-CM

## 2021-08-27 DIAGNOSIS — J45.31 MILD PERSISTENT ASTHMA WITH EXACERBATION: ICD-10-CM

## 2021-08-27 PROCEDURE — 99214 OFFICE O/P EST MOD 30 MIN: CPT | Mod: S$GLB,,, | Performed by: NURSE PRACTITIONER

## 2021-08-27 PROCEDURE — 71046 X-RAY EXAM CHEST 2 VIEWS: CPT | Mod: TC

## 2021-08-27 PROCEDURE — 99214 PR OFFICE/OUTPT VISIT, EST, LEVL IV, 30-39 MIN: ICD-10-PCS | Mod: S$GLB,,, | Performed by: NURSE PRACTITIONER

## 2021-08-27 RX ORDER — FLUCONAZOLE 150 MG/1
150 TABLET ORAL DAILY
Qty: 1 TABLET | Refills: 1 | Status: SHIPPED | OUTPATIENT
Start: 2021-08-27 | End: 2021-08-28

## 2021-08-27 RX ORDER — HYDROCODONE POLISTIREX AND CHLORPHENIRAMINE POLISTIREX 10; 8 MG/5ML; MG/5ML
5 SUSPENSION, EXTENDED RELEASE ORAL EVERY 12 HOURS PRN
Qty: 115 ML | Refills: 0 | Status: SHIPPED | OUTPATIENT
Start: 2021-08-27 | End: 2021-11-15

## 2021-09-01 ENCOUNTER — PATIENT MESSAGE (OUTPATIENT)
Dept: PULMONOLOGY | Facility: CLINIC | Age: 76
End: 2021-09-01

## 2021-09-01 DIAGNOSIS — R05.9 COUGH: ICD-10-CM

## 2021-09-01 DIAGNOSIS — R06.02 SHORTNESS OF BREATH: ICD-10-CM

## 2021-09-08 ENCOUNTER — LAB VISIT (OUTPATIENT)
Dept: LAB | Facility: HOSPITAL | Age: 76
End: 2021-09-08
Attending: INTERNAL MEDICINE
Payer: MEDICARE

## 2021-09-08 ENCOUNTER — OFFICE VISIT (OUTPATIENT)
Dept: RHEUMATOLOGY | Facility: CLINIC | Age: 76
End: 2021-09-08
Payer: MEDICARE

## 2021-09-08 VITALS
SYSTOLIC BLOOD PRESSURE: 157 MMHG | WEIGHT: 208.13 LBS | BODY MASS INDEX: 40.64 KG/M2 | DIASTOLIC BLOOD PRESSURE: 68 MMHG

## 2021-09-08 DIAGNOSIS — M05.79 RHEUMATOID ARTHRITIS INVOLVING MULTIPLE SITES WITH POSITIVE RHEUMATOID FACTOR: Primary | ICD-10-CM

## 2021-09-08 DIAGNOSIS — M05.79 RHEUMATOID ARTHRITIS INVOLVING MULTIPLE SITES WITH POSITIVE RHEUMATOID FACTOR: ICD-10-CM

## 2021-09-08 LAB
ALBUMIN SERPL BCP-MCNC: 3.9 G/DL (ref 3.5–5.2)
ALP SERPL-CCNC: 78 U/L (ref 55–135)
ALT SERPL W/O P-5'-P-CCNC: 24 U/L (ref 10–44)
ANION GAP SERPL CALC-SCNC: 10 MMOL/L (ref 8–16)
AST SERPL-CCNC: 21 U/L (ref 10–40)
BASOPHILS # BLD AUTO: 0.08 K/UL (ref 0–0.2)
BASOPHILS NFR BLD: 0.8 % (ref 0–1.9)
BILIRUB SERPL-MCNC: 0.6 MG/DL (ref 0.1–1)
BUN SERPL-MCNC: 13 MG/DL (ref 8–23)
CALCIUM SERPL-MCNC: 9.6 MG/DL (ref 8.7–10.5)
CHLORIDE SERPL-SCNC: 103 MMOL/L (ref 95–110)
CO2 SERPL-SCNC: 29 MMOL/L (ref 23–29)
CREAT SERPL-MCNC: 0.7 MG/DL (ref 0.5–1.4)
DIFFERENTIAL METHOD: ABNORMAL
EOSINOPHIL # BLD AUTO: 0.6 K/UL (ref 0–0.5)
EOSINOPHIL NFR BLD: 5.4 % (ref 0–8)
ERYTHROCYTE [DISTWIDTH] IN BLOOD BY AUTOMATED COUNT: 14.3 % (ref 11.5–14.5)
EST. GFR  (AFRICAN AMERICAN): >60 ML/MIN/1.73 M^2
EST. GFR  (NON AFRICAN AMERICAN): >60 ML/MIN/1.73 M^2
GLUCOSE SERPL-MCNC: 116 MG/DL (ref 70–110)
HCT VFR BLD AUTO: 44.2 % (ref 37–48.5)
HGB BLD-MCNC: 14.3 G/DL (ref 12–16)
IMM GRANULOCYTES # BLD AUTO: 0.03 K/UL (ref 0–0.04)
IMM GRANULOCYTES NFR BLD AUTO: 0.3 % (ref 0–0.5)
LYMPHOCYTES # BLD AUTO: 2.7 K/UL (ref 1–4.8)
LYMPHOCYTES NFR BLD: 26.9 % (ref 18–48)
MCH RBC QN AUTO: 30.5 PG (ref 27–31)
MCHC RBC AUTO-ENTMCNC: 32.4 G/DL (ref 32–36)
MCV RBC AUTO: 94 FL (ref 82–98)
MONOCYTES # BLD AUTO: 1 K/UL (ref 0.3–1)
MONOCYTES NFR BLD: 9.4 % (ref 4–15)
NEUTROPHILS # BLD AUTO: 5.8 K/UL (ref 1.8–7.7)
NEUTROPHILS NFR BLD: 57.2 % (ref 38–73)
NRBC BLD-RTO: 0 /100 WBC
PLATELET # BLD AUTO: 232 K/UL (ref 150–450)
PMV BLD AUTO: 9.6 FL (ref 9.2–12.9)
POTASSIUM SERPL-SCNC: 4.5 MMOL/L (ref 3.5–5.1)
PROT SERPL-MCNC: 8.3 G/DL (ref 6–8.4)
RBC # BLD AUTO: 4.69 M/UL (ref 4–5.4)
SODIUM SERPL-SCNC: 142 MMOL/L (ref 136–145)
WBC # BLD AUTO: 10.15 K/UL (ref 3.9–12.7)

## 2021-09-08 PROCEDURE — 80053 COMPREHEN METABOLIC PANEL: CPT | Performed by: INTERNAL MEDICINE

## 2021-09-08 PROCEDURE — 85025 COMPLETE CBC W/AUTO DIFF WBC: CPT | Performed by: INTERNAL MEDICINE

## 2021-09-08 PROCEDURE — 99213 PR OFFICE/OUTPT VISIT, EST, LEVL III, 20-29 MIN: ICD-10-PCS | Mod: S$GLB,,, | Performed by: INTERNAL MEDICINE

## 2021-09-08 PROCEDURE — 99213 OFFICE O/P EST LOW 20 MIN: CPT | Mod: S$GLB,,, | Performed by: INTERNAL MEDICINE

## 2021-09-08 PROCEDURE — 36415 COLL VENOUS BLD VENIPUNCTURE: CPT | Performed by: INTERNAL MEDICINE

## 2021-09-08 RX ORDER — TIOTROPIUM BROMIDE 18 UG/1
18 CAPSULE ORAL; RESPIRATORY (INHALATION) DAILY
COMMUNITY
End: 2021-11-15

## 2021-09-08 RX ORDER — HYDROXYCHLOROQUINE SULFATE 200 MG/1
200 TABLET, FILM COATED ORAL DAILY
Qty: 30 TABLET | Refills: 1 | Status: SHIPPED | OUTPATIENT
Start: 2021-09-08 | End: 2021-11-03

## 2021-09-13 ENCOUNTER — HOSPITAL ENCOUNTER (OUTPATIENT)
Dept: RADIOLOGY | Facility: HOSPITAL | Age: 76
Discharge: HOME | End: 2021-09-13
Attending: NURSE PRACTITIONER
Payer: MEDICARE

## 2021-09-13 DIAGNOSIS — R05.9 COUGH: ICD-10-CM

## 2021-09-13 DIAGNOSIS — R06.02 SHORTNESS OF BREATH: ICD-10-CM

## 2021-09-13 PROCEDURE — 71250 CT THORAX DX C-: CPT | Mod: TC,PO

## 2021-09-14 ENCOUNTER — TELEPHONE (OUTPATIENT)
Dept: PULMONOLOGY | Facility: CLINIC | Age: 76
End: 2021-09-14

## 2021-09-14 ENCOUNTER — PATIENT MESSAGE (OUTPATIENT)
Dept: PULMONOLOGY | Facility: CLINIC | Age: 76
End: 2021-09-14

## 2021-09-14 DIAGNOSIS — J81.0 ACUTE PULMONARY EDEMA: Primary | ICD-10-CM

## 2021-09-28 ENCOUNTER — TELEPHONE (OUTPATIENT)
Dept: PULMONOLOGY | Facility: CLINIC | Age: 76
End: 2021-09-28

## 2021-10-06 ENCOUNTER — TELEPHONE (OUTPATIENT)
Dept: PULMONOLOGY | Facility: CLINIC | Age: 76
End: 2021-10-06

## 2021-10-25 ENCOUNTER — OFFICE VISIT (OUTPATIENT)
Dept: RHEUMATOLOGY | Facility: CLINIC | Age: 76
End: 2021-10-25
Payer: MEDICARE

## 2021-10-25 ENCOUNTER — LAB VISIT (OUTPATIENT)
Dept: LAB | Facility: HOSPITAL | Age: 76
End: 2021-10-25
Attending: INTERNAL MEDICINE
Payer: MEDICARE

## 2021-10-25 VITALS — WEIGHT: 210 LBS | BODY MASS INDEX: 41.01 KG/M2 | SYSTOLIC BLOOD PRESSURE: 128 MMHG | DIASTOLIC BLOOD PRESSURE: 62 MMHG

## 2021-10-25 DIAGNOSIS — M05.79 SEROPOSITIVE RHEUMATOID ARTHRITIS OF MULTIPLE SITES: Primary | ICD-10-CM

## 2021-10-25 DIAGNOSIS — M05.79 RHEUMATOID ARTHRITIS INVOLVING MULTIPLE SITES WITH POSITIVE RHEUMATOID FACTOR: Primary | ICD-10-CM

## 2021-10-25 LAB
ALBUMIN SERPL BCP-MCNC: 3.6 G/DL (ref 3.5–5.2)
ALP SERPL-CCNC: 78 U/L (ref 55–135)
ALT SERPL W/O P-5'-P-CCNC: 17 U/L (ref 10–44)
ANION GAP SERPL CALC-SCNC: 8 MMOL/L (ref 8–16)
AST SERPL-CCNC: 18 U/L (ref 10–40)
BASOPHILS # BLD AUTO: 0.07 K/UL (ref 0–0.2)
BASOPHILS NFR BLD: 0.9 % (ref 0–1.9)
BILIRUB SERPL-MCNC: 1 MG/DL (ref 0.1–1)
BUN SERPL-MCNC: 14 MG/DL (ref 8–23)
CALCIUM SERPL-MCNC: 9.2 MG/DL (ref 8.7–10.5)
CHLORIDE SERPL-SCNC: 105 MMOL/L (ref 95–110)
CO2 SERPL-SCNC: 26 MMOL/L (ref 23–29)
CREAT SERPL-MCNC: 0.7 MG/DL (ref 0.5–1.4)
CRP SERPL-MCNC: 1.5 MG/L (ref 0–8.2)
DIFFERENTIAL METHOD: ABNORMAL
EOSINOPHIL # BLD AUTO: 0.3 K/UL (ref 0–0.5)
EOSINOPHIL NFR BLD: 3.9 % (ref 0–8)
ERYTHROCYTE [DISTWIDTH] IN BLOOD BY AUTOMATED COUNT: 15.2 % (ref 11.5–14.5)
EST. GFR  (AFRICAN AMERICAN): >60 ML/MIN/1.73 M^2
EST. GFR  (NON AFRICAN AMERICAN): >60 ML/MIN/1.73 M^2
GLUCOSE SERPL-MCNC: 116 MG/DL (ref 70–110)
HCT VFR BLD AUTO: 41.5 % (ref 37–48.5)
HGB BLD-MCNC: 13.2 G/DL (ref 12–16)
IMM GRANULOCYTES # BLD AUTO: 0.02 K/UL (ref 0–0.04)
IMM GRANULOCYTES NFR BLD AUTO: 0.3 % (ref 0–0.5)
LYMPHOCYTES # BLD AUTO: 2.4 K/UL (ref 1–4.8)
LYMPHOCYTES NFR BLD: 32.4 % (ref 18–48)
MCH RBC QN AUTO: 29.8 PG (ref 27–31)
MCHC RBC AUTO-ENTMCNC: 31.8 G/DL (ref 32–36)
MCV RBC AUTO: 94 FL (ref 82–98)
MONOCYTES # BLD AUTO: 0.7 K/UL (ref 0.3–1)
MONOCYTES NFR BLD: 9.6 % (ref 4–15)
NEUTROPHILS # BLD AUTO: 4 K/UL (ref 1.8–7.7)
NEUTROPHILS NFR BLD: 52.9 % (ref 38–73)
NRBC BLD-RTO: 0 /100 WBC
PLATELET # BLD AUTO: 201 K/UL (ref 150–450)
PMV BLD AUTO: 9.5 FL (ref 9.2–12.9)
POTASSIUM SERPL-SCNC: 4 MMOL/L (ref 3.5–5.1)
PROT SERPL-MCNC: 7.9 G/DL (ref 6–8.4)
RBC # BLD AUTO: 4.43 M/UL (ref 4–5.4)
SODIUM SERPL-SCNC: 139 MMOL/L (ref 136–145)
WBC # BLD AUTO: 7.52 K/UL (ref 3.9–12.7)

## 2021-10-25 PROCEDURE — 99213 OFFICE O/P EST LOW 20 MIN: CPT | Mod: S$GLB,,, | Performed by: INTERNAL MEDICINE

## 2021-10-25 PROCEDURE — 99213 PR OFFICE/OUTPT VISIT, EST, LEVL III, 20-29 MIN: ICD-10-PCS | Mod: S$GLB,,, | Performed by: INTERNAL MEDICINE

## 2021-10-25 PROCEDURE — 80053 COMPREHEN METABOLIC PANEL: CPT | Performed by: INTERNAL MEDICINE

## 2021-10-25 PROCEDURE — 86140 C-REACTIVE PROTEIN: CPT | Performed by: INTERNAL MEDICINE

## 2021-10-25 PROCEDURE — 85025 COMPLETE CBC W/AUTO DIFF WBC: CPT | Performed by: INTERNAL MEDICINE

## 2021-10-25 PROCEDURE — 36415 COLL VENOUS BLD VENIPUNCTURE: CPT | Performed by: INTERNAL MEDICINE

## 2021-10-25 RX ORDER — SPIRONOLACTONE 25 MG/1
50 TABLET ORAL DAILY
COMMUNITY
Start: 2021-10-04 | End: 2022-01-31 | Stop reason: SDUPTHER

## 2021-11-05 ENCOUNTER — OFFICE VISIT (OUTPATIENT)
Dept: FAMILY MEDICINE | Facility: CLINIC | Age: 76
End: 2021-11-05
Attending: FAMILY MEDICINE
Payer: MEDICARE

## 2021-11-05 VITALS
OXYGEN SATURATION: 95 % | DIASTOLIC BLOOD PRESSURE: 80 MMHG | RESPIRATION RATE: 18 BRPM | WEIGHT: 209.13 LBS | HEART RATE: 65 BPM | TEMPERATURE: 98 F | SYSTOLIC BLOOD PRESSURE: 130 MMHG | BODY MASS INDEX: 41.06 KG/M2 | HEIGHT: 60 IN

## 2021-11-05 DIAGNOSIS — I87.2 VENOUS INSUFFICIENCY: ICD-10-CM

## 2021-11-05 DIAGNOSIS — J96.11 CHRONIC HYPOXEMIC RESPIRATORY FAILURE: ICD-10-CM

## 2021-11-05 DIAGNOSIS — E78.5 HYPERLIPIDEMIA, UNSPECIFIED HYPERLIPIDEMIA TYPE: ICD-10-CM

## 2021-11-05 DIAGNOSIS — M06.9 RHEUMATOID ARTHRITIS, INVOLVING UNSPECIFIED SITE, UNSPECIFIED WHETHER RHEUMATOID FACTOR PRESENT: Primary | ICD-10-CM

## 2021-11-05 DIAGNOSIS — J30.9 CHRONIC ALLERGIC RHINITIS: ICD-10-CM

## 2021-11-05 DIAGNOSIS — E66.01 MORBID OBESITY WITH BMI OF 40.0-44.9, ADULT: ICD-10-CM

## 2021-11-05 DIAGNOSIS — J45.31 MILD PERSISTENT ASTHMA WITH EXACERBATION: ICD-10-CM

## 2021-11-05 DIAGNOSIS — G47.33 OSA (OBSTRUCTIVE SLEEP APNEA): ICD-10-CM

## 2021-11-05 DIAGNOSIS — I10 PRIMARY HYPERTENSION: ICD-10-CM

## 2021-11-05 PROCEDURE — 99214 OFFICE O/P EST MOD 30 MIN: CPT | Mod: S$PBB,,, | Performed by: FAMILY MEDICINE

## 2021-11-05 PROCEDURE — 99999 PR PBB SHADOW E&M-EST. PATIENT-LVL V: ICD-10-PCS | Mod: PBBFAC,,, | Performed by: FAMILY MEDICINE

## 2021-11-05 PROCEDURE — 99214 PR OFFICE/OUTPT VISIT, EST, LEVL IV, 30-39 MIN: ICD-10-PCS | Mod: S$PBB,,, | Performed by: FAMILY MEDICINE

## 2021-11-05 PROCEDURE — 99215 OFFICE O/P EST HI 40 MIN: CPT | Mod: PBBFAC,PN | Performed by: FAMILY MEDICINE

## 2021-11-05 PROCEDURE — 99999 PR PBB SHADOW E&M-EST. PATIENT-LVL V: CPT | Mod: PBBFAC,,, | Performed by: FAMILY MEDICINE

## 2021-11-12 ENCOUNTER — IMMUNIZATION (OUTPATIENT)
Dept: FAMILY MEDICINE | Facility: CLINIC | Age: 76
End: 2021-11-12
Payer: MEDICARE

## 2021-11-12 DIAGNOSIS — Z23 NEED FOR VACCINATION: Primary | ICD-10-CM

## 2021-11-12 PROCEDURE — 0004A COVID-19, MRNA, LNP-S, PF, 30 MCG/0.3 ML DOSE VACCINE: CPT | Mod: PBBFAC

## 2021-11-15 ENCOUNTER — OFFICE VISIT (OUTPATIENT)
Dept: PULMONOLOGY | Facility: CLINIC | Age: 76
End: 2021-11-15
Payer: MEDICARE

## 2021-11-15 VITALS
HEIGHT: 60 IN | DIASTOLIC BLOOD PRESSURE: 95 MMHG | OXYGEN SATURATION: 97 % | SYSTOLIC BLOOD PRESSURE: 185 MMHG | BODY MASS INDEX: 41.35 KG/M2 | HEART RATE: 59 BPM | WEIGHT: 210.63 LBS

## 2021-11-15 DIAGNOSIS — J96.11 CHRONIC HYPOXEMIC RESPIRATORY FAILURE: ICD-10-CM

## 2021-11-15 DIAGNOSIS — M06.9 RHEUMATOID ARTHRITIS, INVOLVING UNSPECIFIED SITE, UNSPECIFIED WHETHER RHEUMATOID FACTOR PRESENT: ICD-10-CM

## 2021-11-15 DIAGNOSIS — G47.33 OSA (OBSTRUCTIVE SLEEP APNEA): Primary | ICD-10-CM

## 2021-11-15 DIAGNOSIS — J45.40 MODERATE PERSISTENT ASTHMA, UNSPECIFIED WHETHER COMPLICATED: ICD-10-CM

## 2021-11-15 PROCEDURE — 99214 OFFICE O/P EST MOD 30 MIN: CPT | Mod: S$GLB,,, | Performed by: NURSE PRACTITIONER

## 2021-11-15 PROCEDURE — 99214 PR OFFICE/OUTPT VISIT, EST, LEVL IV, 30-39 MIN: ICD-10-PCS | Mod: S$GLB,,, | Performed by: NURSE PRACTITIONER

## 2021-11-15 RX ORDER — SPIRONOLACTONE 50 MG/1
TABLET, FILM COATED ORAL
COMMUNITY
Start: 2021-10-25

## 2021-11-22 ENCOUNTER — TELEPHONE (OUTPATIENT)
Dept: RHEUMATOLOGY | Facility: CLINIC | Age: 76
End: 2021-11-22
Payer: MEDICARE

## 2021-12-06 ENCOUNTER — PATIENT OUTREACH (OUTPATIENT)
Dept: ADMINISTRATIVE | Facility: OTHER | Age: 76
End: 2021-12-06
Payer: MEDICARE

## 2021-12-06 DIAGNOSIS — Z12.11 ENCOUNTER FOR FIT (FECAL IMMUNOCHEMICAL TEST) SCREENING: Primary | ICD-10-CM

## 2021-12-07 ENCOUNTER — HOSPITAL ENCOUNTER (OUTPATIENT)
Dept: RADIOLOGY | Facility: HOSPITAL | Age: 76
Discharge: HOME | End: 2021-12-07
Attending: PHYSICAL MEDICINE & REHABILITATION
Payer: MEDICARE

## 2021-12-07 ENCOUNTER — OFFICE VISIT (OUTPATIENT)
Dept: PHYSICAL MEDICINE AND REHAB | Facility: CLINIC | Age: 76
End: 2021-12-07
Payer: MEDICARE

## 2021-12-07 VITALS — BODY MASS INDEX: 41.23 KG/M2 | HEIGHT: 60 IN | WEIGHT: 210 LBS

## 2021-12-07 DIAGNOSIS — M17.0 PRIMARY OSTEOARTHRITIS OF BOTH KNEES: Primary | ICD-10-CM

## 2021-12-07 DIAGNOSIS — M25.562 CHRONIC PAIN OF BOTH KNEES: ICD-10-CM

## 2021-12-07 DIAGNOSIS — E66.01 MORBID OBESITY WITH BMI OF 40.0-44.9, ADULT: ICD-10-CM

## 2021-12-07 DIAGNOSIS — G89.29 CHRONIC PAIN OF BOTH KNEES: ICD-10-CM

## 2021-12-07 DIAGNOSIS — M17.0 PRIMARY OSTEOARTHRITIS OF BOTH KNEES: ICD-10-CM

## 2021-12-07 DIAGNOSIS — Z74.09 IMPAIRED FUNCTIONAL MOBILITY AND ACTIVITY TOLERANCE: ICD-10-CM

## 2021-12-07 DIAGNOSIS — M25.561 CHRONIC PAIN OF BOTH KNEES: ICD-10-CM

## 2021-12-07 PROCEDURE — 99999 PR PBB SHADOW E&M-EST. PATIENT-LVL III: ICD-10-PCS | Mod: PBBFAC,,, | Performed by: PHYSICAL MEDICINE & REHABILITATION

## 2021-12-07 PROCEDURE — 99213 OFFICE O/P EST LOW 20 MIN: CPT | Mod: PBBFAC,PN,25 | Performed by: PHYSICAL MEDICINE & REHABILITATION

## 2021-12-07 PROCEDURE — 73564 X-RAY EXAM KNEE 4 OR MORE: CPT | Mod: 26,50,, | Performed by: RADIOLOGY

## 2021-12-07 PROCEDURE — 99203 PR OFFICE/OUTPT VISIT, NEW, LEVL III, 30-44 MIN: ICD-10-PCS | Mod: S$PBB,25,, | Performed by: PHYSICAL MEDICINE & REHABILITATION

## 2021-12-07 PROCEDURE — 73564 XR KNEE COMP 4 OR MORE VIEWS BILAT: ICD-10-PCS | Mod: 26,50,, | Performed by: RADIOLOGY

## 2021-12-07 PROCEDURE — 20611 PR DRAIN/ASP/INJECT MAJOR JOINT/BURSA W/US GUIDANCE: ICD-10-PCS | Mod: S$PBB,LT,, | Performed by: PHYSICAL MEDICINE & REHABILITATION

## 2021-12-07 PROCEDURE — 20611 DRAIN/INJ JOINT/BURSA W/US: CPT | Mod: S$PBB,LT,, | Performed by: PHYSICAL MEDICINE & REHABILITATION

## 2021-12-07 PROCEDURE — 73564 X-RAY EXAM KNEE 4 OR MORE: CPT | Mod: TC,50,FY

## 2021-12-07 PROCEDURE — 99999 PR PBB SHADOW E&M-EST. PATIENT-LVL III: CPT | Mod: PBBFAC,,, | Performed by: PHYSICAL MEDICINE & REHABILITATION

## 2021-12-07 PROCEDURE — 99203 OFFICE O/P NEW LOW 30 MIN: CPT | Mod: S$PBB,25,, | Performed by: PHYSICAL MEDICINE & REHABILITATION

## 2021-12-07 PROCEDURE — 20611 DRAIN/INJ JOINT/BURSA W/US: CPT | Mod: PBBFAC,PN | Performed by: PHYSICAL MEDICINE & REHABILITATION

## 2021-12-07 RX ORDER — OLMESARTAN MEDOXOMIL 40 MG/1
40 TABLET ORAL DAILY
COMMUNITY
Start: 2021-12-06

## 2021-12-09 ENCOUNTER — PATIENT MESSAGE (OUTPATIENT)
Dept: PHYSICAL MEDICINE AND REHAB | Facility: CLINIC | Age: 76
End: 2021-12-09
Payer: MEDICARE

## 2021-12-15 ENCOUNTER — HOSPITAL ENCOUNTER (OUTPATIENT)
Dept: RADIOLOGY | Facility: CLINIC | Age: 76
Discharge: HOME | End: 2021-12-15
Attending: FAMILY MEDICINE
Payer: MEDICARE

## 2021-12-15 DIAGNOSIS — Z12.31 BREAST CANCER SCREENING BY MAMMOGRAM: ICD-10-CM

## 2021-12-15 PROCEDURE — 77063 BREAST TOMOSYNTHESIS BI: CPT | Mod: 26,,, | Performed by: RADIOLOGY

## 2021-12-15 PROCEDURE — 77067 SCR MAMMO BI INCL CAD: CPT | Mod: 26,,, | Performed by: RADIOLOGY

## 2021-12-15 PROCEDURE — 77063 MAMMO DIGITAL SCREENING BILAT WITH TOMO: ICD-10-PCS | Mod: 26,,, | Performed by: RADIOLOGY

## 2021-12-15 PROCEDURE — 77067 MAMMO DIGITAL SCREENING BILAT WITH TOMO: ICD-10-PCS | Mod: 26,,, | Performed by: RADIOLOGY

## 2021-12-15 PROCEDURE — 77067 SCR MAMMO BI INCL CAD: CPT | Mod: TC,PO

## 2022-01-31 ENCOUNTER — LAB VISIT (OUTPATIENT)
Dept: LAB | Facility: HOSPITAL | Age: 77
End: 2022-01-31
Attending: INTERNAL MEDICINE
Payer: MEDICARE

## 2022-01-31 ENCOUNTER — OFFICE VISIT (OUTPATIENT)
Dept: RHEUMATOLOGY | Facility: CLINIC | Age: 77
End: 2022-01-31
Payer: MEDICARE

## 2022-01-31 VITALS — DIASTOLIC BLOOD PRESSURE: 84 MMHG | WEIGHT: 216.63 LBS | BODY MASS INDEX: 42.3 KG/M2 | SYSTOLIC BLOOD PRESSURE: 146 MMHG

## 2022-01-31 DIAGNOSIS — M05.79 RHEUMATOID ARTHRITIS INVOLVING MULTIPLE SITES WITH POSITIVE RHEUMATOID FACTOR: Primary | ICD-10-CM

## 2022-01-31 DIAGNOSIS — M05.79 RHEUMATOID ARTHRITIS INVOLVING MULTIPLE SITES WITH POSITIVE RHEUMATOID FACTOR: ICD-10-CM

## 2022-01-31 PROCEDURE — 36415 COLL VENOUS BLD VENIPUNCTURE: CPT | Performed by: INTERNAL MEDICINE

## 2022-01-31 PROCEDURE — 99213 OFFICE O/P EST LOW 20 MIN: CPT | Mod: S$GLB,,, | Performed by: INTERNAL MEDICINE

## 2022-01-31 PROCEDURE — 86480 TB TEST CELL IMMUN MEASURE: CPT | Performed by: INTERNAL MEDICINE

## 2022-01-31 PROCEDURE — 99213 PR OFFICE/OUTPT VISIT, EST, LEVL III, 20-29 MIN: ICD-10-PCS | Mod: S$GLB,,, | Performed by: INTERNAL MEDICINE

## 2022-01-31 RX ORDER — HYDROXYCHLOROQUINE SULFATE 200 MG/1
200 TABLET, FILM COATED ORAL DAILY
Qty: 30 TABLET | Refills: 1 | Status: SHIPPED | OUTPATIENT
Start: 2022-01-31 | End: 2022-05-09

## 2022-01-31 NOTE — PROGRESS NOTES
[facility] RHEUMATOLOGY            PROGRESS NOTE      Subjective:       Patient ID:   NAME: Ling Rapp : 1945     76 y.o. female    Referring Doc: No ref. provider found  Other Physicians:    Chief Complaint:  Rheumatoid Arthritis      History of Present Illness:     Patient returns today for a regularly scheduled follow-up visit for    RA   The patient is doing well.No fevers,cough or CP.  No prolonged AM stiffness.Pain in DIP 's  No GI complaints            ROS:   GEN:  No  fever, night sweats . weight is stable   + fatigue  SKIN: no rashes, no bruising, no ulcerations, no Raynaud's  HEENT: no HA's, No visual changes, no mucosal ulcers, no sicca symptoms,  CV:   no CP, SOB, PND,+ mild SEN, no orthopnea, no palpitations  PULM: normal with no SOB, cough, hemoptysis, sputum or pleuritic pain  GI:  no abdominal pain, nausea, vomiting, constipation, diarrhea, melanotic stools, BRBPR, hematemesis, no dysphagia  NEURO: no paresthesias, headaches, visual disturbances, muscle weakness  MUSCULOSKELETAL:no joint swelling, prolonged AM stiffness, no back pain, no muscle pain  Allergies:  Review of patient's allergies indicates:   Allergen Reactions    Amoxicillin-pot clavulanate Diarrhea and Other (See Comments)     Sever cramping    Diltiazem Other (See Comments)     Extreme dry moth    Fenofibrate Other (See Comments)     Other reaction(s): Itching    Leflunomide      Other reaction(s): abd pains    Lisinopril-hydrochlorothiazide      Other reaction(s): diarrhea  Other reaction(s): cramps    Methotrexate      Other reaction(s): pancreatitis    Norvasc [amlodipine] Swelling    Sulfa (sulfonamide antibiotics)      Other reaction(s): Swelling  Other reaction(s): Hives    Sulfamethoxazole-trimethoprim        Medications:    Current Outpatient Medications:     albuterol (PROAIR HFA) 90 mcg/actuation inhaler, Inhale 2 puffs into the lungs every 6 (six) hours as needed for Wheezing., Disp: 3  Inhaler, Rfl: 5    artificial saliva, reynaldo SprA, Use as directed, Disp: 240 mL, Rfl: 11    aspirin 81 mg Tab, Take 81 mg by mouth once daily. Every day, Disp: , Rfl:     atenolol (TENORMIN) 100 MG tablet, TAKE 1 TABLET DAILY, Disp: 90 tablet, Rfl: 2    azelastine (ASTELIN) 137 mcg (0.1 %) nasal spray, 1 spray by Nasal route 2 (two) times daily., Disp: , Rfl:     benzonatate (TESSALON) 200 MG capsule, Take 200 mg by mouth 3 (three) times daily as needed for Cough., Disp: , Rfl:     CALCIUM CARBONATE/VITAMIN D3 (OS-ADDY 500 + D) 500-125 mg-unit Tab, Take 1 tablet by mouth once daily. Twice a day, Disp: , Rfl:     cetirizine (ZYRTEC) 10 MG tablet, Take 10 mg by mouth once daily. Every day, Disp: , Rfl:     cholecalciferol, vitamin D3, 5,000 unit capsule, Take 5,000 Units by mouth once daily. 5 a week, Disp: , Rfl:     cyanocobalamin (VITAMIN B-12) 100 MCG tablet, Take 100 mcg by mouth once daily., Disp: , Rfl:     diclofenac sodium (VOLTAREN) 1 % Gel, Apply 2 g topically once daily., Disp: , Rfl:     entanercept (ENBREL) 50 mg/mL injection, 50 mg once a week. once weekly, Disp: , Rfl:     fluticasone (FLONASE) 50 mcg/actuation nasal spray, 2 sprays by Each Nare route once daily. Every day, Disp: 48 g, Rfl: 3    fluticasone furoate-vilanteroL (BREO ELLIPTA) 100-25 mcg/dose diskus inhaler, Inhale 1 puff into the lungs once daily. Controller Rinse after you use it, Disp: 3 each, Rfl: 6    hydrOXYchloroQUINE (PLAQUENIL) 200 mg tablet, TAKE 1 TABLET BY MOUTH EVERY DAY, Disp: 30 tablet, Rfl: 1    magnesium oxide (MAG-OX) 400 mg tablet, Take 400 mg by mouth once daily., Disp: , Rfl:     montelukast (SINGULAIR) 10 mg tablet, Take 1 tablet (10 mg total) by mouth every evening., Disp: 90 tablet, Rfl: 3    olmesartan (BENICAR) 40 MG tablet, Take 40 mg by mouth once daily., Disp: , Rfl:     omeprazole (PRILOSEC) 40 MG capsule, TAKE 1 CAPSULE TWICE DAILY BEFORE MEALS, Disp: 180 capsule, Rfl: 2     spironolactone (ALDACTONE) 50 MG tablet, , Disp: , Rfl:     UBIDECARENONE (COQ-10 ORAL), Take 1 capsule by mouth once daily. , Disp: , Rfl:     PMHx/PSHx Updates:        Last Eye Exam : UTD      Objective:     Vitals:  Blood pressure (!) 146/84, weight 98.2 kg (216 lb 9.6 oz).    Physical Examination:   GEN: no apparent distress, comfortable; AAOx3  SKIN: no rashes,no ulceration, no Raynaud's, no petechiae, no SQ nodules,  HEAD: normal  EYES: no pallor, no icterus  NECK: no masses, thyroid normal, trachea midline, no LAD/LN's, supple  CV: RRR with no murmur; l S1 and S2 reg. ,no gallop no rubs,   CHEST: Normal respiratory effort; CTAB; normal breath sounds; no wheeze or crackles  ABDOM: nontender and nondistended; soft; no masses; no rebound/guarding  MUSC/Skeletal: ROM normal; no crepitus; joints without synovitis,  no deformities  No joint swelling or tenderness of PIP, MCP, wrist, elbow, shoulder, or knee joints  EXTREM: no clubbing, cyanosis, no edema,  NEURO: grossly intact; motor WNL; AAOx3;  PSYCH: normal mood, affect and behavior  LYMPH: normal cervical, supraclavicular          Labs:   Lab Results   Component Value Date    WBC 7.52 10/25/2021    HGB 13.2 10/25/2021    HCT 41.5 10/25/2021    MCV 94 10/25/2021     10/25/2021    CMP  @LASTLAB(NA,K,CL,CO2,GLU,BUN,Creatinine,Calcium,PROT,Albumin,Bilitot,Alkphos,AST,ALT,CRP,ESR,RF,CCP,VANNA,SSA,CPK,uric acid) )@  I have reviewed all available lab results and radiology reports.    Radiology/Diagnostic Studies:        Assessment/Plan:   (1) 76 y.o. female with diagnosis of RA stable  Labs  FU in 3 months                Discussion:     I have explained all of the above in detail and the patient understands all of the current recommendation(s). I have answered all questions to the best of my ability and to their complete satisfaction.       The patient is to continue with the current management plan         RTC in         Electronically signed by Manjinder FREITAS  MD Antoine

## 2022-02-02 LAB
GAMMA INTERFERON BACKGROUND BLD IA-ACNC: 0.02 IU/ML
M TB IFN-G CD4+ BCKGRND COR BLD-ACNC: 0 IU/ML
MITOGEN IGNF BCKGRD COR BLD-ACNC: 9.87 IU/ML
TB GOLD PLUS: NEGATIVE
TB2 - NIL: 0 IU/ML

## 2022-02-23 DIAGNOSIS — D84.9 IMMUNOSUPPRESSED STATUS: ICD-10-CM

## 2022-02-25 ENCOUNTER — TELEPHONE (OUTPATIENT)
Dept: PULMONOLOGY | Facility: CLINIC | Age: 77
End: 2022-02-25
Payer: MEDICARE

## 2022-02-25 NOTE — TELEPHONE ENCOUNTER
Spoke with the patient.  Sinus congestion started yesterday. She states she has had no covid exposure.  She will take mucinex d for few days and watch her BP. She is using netti pot with distilled water as well

## 2022-02-25 NOTE — TELEPHONE ENCOUNTER
----- Message from Yeyo Mike sent at 2/25/2022  9:29 AM CST -----  Regarding: Patient called  Patient called and would like to know if she can be seen this morning as well. Pt stated that she has yellow mucus coming out of her nose. And yes there is availability.

## 2022-04-12 NOTE — PROGRESS NOTES
[facility] RHEUMATOLOGY            PROGRESS NOTE      Subjective:       Patient ID:   NAME: Ling Rapp : 1945     73 y.o. female    Referring Doc: No ref. provider found  Other Physicians:    Chief Complaint:  Rheumatoid Arthritis      History of Present Illness:     Patient returns today for a regularly scheduled follow-up visit for rheumatoid arthritis      The patient has had  Epidural lumbar injection since her last visit. She is recovering and improving. No chest pains cough or shortness of breath. No prolonged morning stiffness. No joint swelling.            ROS:   GEN:  No  fever, night sweats . weight is stable   No fatigue  SKIN: no rashes, no bruising, no ulcerations, no Raynaud's  HEENT: no HA's, No visual changes, no mucosal ulcers, no sicca symptoms,  CV:   no CP, SOB, PND, SEN, no orthopnea, no palpitations  PULM: normal with no SOB, cough, hemoptysis, sputum or pleuritic pain  GI:  no abdominal pain, nausea, vomiting, constipation, diarrhea, melanotic stools, BRBPR, hematemesis, no dysphagia  :   no dysuria  NEURO: no paresthesias, headaches, visual disturbances, muscle weakness  MUSCULOSKELETAL:no joint swelling, prolonged AM stiffness, + back pain, no muscle pain  Allergies:  Review of patient's allergies indicates:   Allergen Reactions    Amoxicillin-pot clavulanate Diarrhea and Other (See Comments)     Sever cramping    Diltiazem Other (See Comments)     Extreme dry moth    Fenofibrate Other (See Comments)     Other reaction(s): Itching    Hydroxychloroquine      Other reaction(s): eye accomodation problems    Leflunomide      Other reaction(s): abd pains    Lisinopril-hydrochlorothiazide      Other reaction(s): diarrhea  Other reaction(s): cramps    Methotrexate      Other reaction(s): pancreatitis    Norvasc [amlodipine] Swelling    Sulfa (sulfonamide antibiotics)      Other reaction(s): Swelling  Other reaction(s): Hives    Sulfamethoxazole-trimethoprim   Please check patient in for his appointment time earlier today so I can dictate an auditory visit and complete my note; thank you         Medications:    Current Outpatient Medications:     albuterol (ACCUNEB) 1.25 mg/3 mL Nebu, Take 3 mLs (1.25 mg total) by nebulization every 6 (six) hours as needed (shortness of breath, or wheezing). Rescue, Disp: 1 Box, Rfl: 3    albuterol (PROAIR HFA) 90 mcg/actuation inhaler, Inhale 2 puffs into the lungs every 6 (six) hours as needed for Wheezing., Disp: 3 Inhaler, Rfl: 5    artificial saliva, yerbas-lyt, SprA, Use as directed, Disp: 240 mL, Rfl: 11    aspirin 81 mg Tab, Take 81 mg by mouth once daily. Every day, Disp: , Rfl:     atenolol (TENORMIN) 100 MG tablet, TAKE 1 TABLET DAILY, Disp: 90 tablet, Rfl: 2    azelastine (ASTELIN) 137 mcg (0.1 %) nasal spray, 1 spray by Nasal route 2 (two) times daily., Disp: , Rfl:     beclomethasone (QVAR) 80 mcg/actuation Aero, Inhale 1 puff into the lungs 2 (two) times daily. Controller, Disp: , Rfl:     benzonatate (TESSALON) 200 MG capsule, Take 200 mg by mouth 3 (three) times daily as needed for Cough., Disp: , Rfl:     CALCIUM CARBONATE/VITAMIN D3 (OS-ADDY 500 + D) 500-125 mg-unit Tab, Take 1 tablet by mouth once daily. Twice a day, Disp: , Rfl:     cetirizine (ZYRTEC) 10 MG tablet, Take 10 mg by mouth once daily. Every day, Disp: , Rfl:     cholecalciferol, vitamin D3, 5,000 unit capsule, Take 5,000 Units by mouth once daily. 5 a week, Disp: , Rfl:     cyanocobalamin (VITAMIN B-12) 100 MCG tablet, Take 100 mcg by mouth once daily., Disp: , Rfl:     diclofenac sodium (VOLTAREN) 1 % Gel, Apply 2 g topically once daily., Disp: , Rfl:     etanercept (ENBREL) 50 mg/mL (0.98 mL) injection, 50 mg once a week. once weekly, Disp: , Rfl:     fluticasone (FLONASE) 50 mcg/actuation nasal spray, 2 sprays by Each Nare route once daily. Every day, Disp: 48 g, Rfl: 3    gabapentin (NEURONTIN) 300 MG capsule, Take 1 capsule (300 mg total) by mouth every evening., Disp: 30 capsule, Rfl: 1    magnesium oxide (MAG-OX) 400 mg tablet, Take 400 mg by mouth once daily.,  Disp: , Rfl:     montelukast (SINGULAIR) 10 mg tablet, Take 1 tablet (10 mg total) by mouth every evening., Disp: 30 tablet, Rfl: 3    omeprazole (PRILOSEC) 40 MG capsule, TAKE 1 CAPSULE TWICE DAILY BEFORE MEALS, Disp: 180 capsule, Rfl: 2    oxyCODONE-acetaminophen (PERCOCET)  mg per tablet, Take 1 tablet by mouth every 4 to 6 hours as needed for Pain., Disp: 30 tablet, Rfl: 0    UBIDECARENONE (COQ-10 ORAL), Take 1 capsule by mouth once daily. , Disp: , Rfl:     walker (ULTRA-LIGHT ROLLATOR) Misc, Use for ambulation, Disp: , Rfl: 0    PMHx/PSHx Updates:          Objective:     Vitals:  Blood pressure 138/72, weight 95.7 kg (210 lb 14.4 oz).    Physical Examination:   GEN: no apparent distress, comfortable; AAOx3  SKIN: no rashes,no ulceration, no Raynaud's, no petechiae, no SQ nodules,  HEAD: normal  EYES: no pallor, no icterus  NECK: no masses, thyroid normal, trachea midline, no LAD/LN's, supple  CV: RRR with no murmur; l S1 and S2 reg. ,no gallop no rubs,   CHEST: Normal respiratory effort; CTAB; normal breath sounds; no wheeze or crackles  MUSC/Skeletal: ROM normal; no crepitus; joints without synovitis,  + Heberden's deformities  No joint swelling or tenderness of PIP, MCP, wrist, elbow, shoulder, or knee joints  EXTREM: no clubbing, cyanosis, no edema,normal  pulses   NEURO: grossly intact; motor WNL; AAOx3;  PSYCH: normal mood, affect and behavior  LYMPH: normal cervical, supraclavicular          Labs:   Lab Results   Component Value Date    WBC 7.87 05/10/2019    HGB 14.4 05/10/2019    HCT 46.4 05/10/2019    MCV 96 05/10/2019     05/10/2019    CMP  @LASTLAB(NA,K,CL,CO2,GLU,BUN,Creatinine,Calcium,PROT,Albumin,Bilitot,Alkphos,AST,ALT,CRP,ESR,RF,CCP,VANNA,SSA,CPK,uric acid) )@  I have reviewed all available lab results and radiology reports.    Radiology/Diagnostic Studies:        Assessment/Plan:   (1) 73 y.o. female with diagnosis of rheumatoid arthritis and Osteoarthritis She is stable  She  is on Enbrel. Might be able to switch her to a milder drug ( Plaquenil) if she continues to do well.      CBC CMP CRP        Discussion:     I have explained all of the above in detail and the patient understands all of the current recommendation(s). I have answered all questions to the best of my ability and to their complete satisfaction.       The patient is to continue with the current management plan         RTC in 3 months        Electronically signed by Manjinder Schultz MD

## 2022-05-18 ENCOUNTER — OFFICE VISIT (OUTPATIENT)
Dept: PULMONOLOGY | Facility: CLINIC | Age: 77
End: 2022-05-18
Payer: MEDICARE

## 2022-05-18 VITALS
OXYGEN SATURATION: 96 % | HEIGHT: 60 IN | HEART RATE: 66 BPM | WEIGHT: 220 LBS | BODY MASS INDEX: 43.19 KG/M2 | SYSTOLIC BLOOD PRESSURE: 150 MMHG | DIASTOLIC BLOOD PRESSURE: 70 MMHG

## 2022-05-18 DIAGNOSIS — J45.909 ASTHMA, UNSPECIFIED ASTHMA SEVERITY, UNSPECIFIED WHETHER COMPLICATED, UNSPECIFIED WHETHER PERSISTENT: Primary | ICD-10-CM

## 2022-05-18 DIAGNOSIS — G47.33 OSA (OBSTRUCTIVE SLEEP APNEA): ICD-10-CM

## 2022-05-18 DIAGNOSIS — M06.9 RHEUMATOID ARTHRITIS, INVOLVING UNSPECIFIED SITE, UNSPECIFIED WHETHER RHEUMATOID FACTOR PRESENT: ICD-10-CM

## 2022-05-18 DIAGNOSIS — J96.11 CHRONIC HYPOXEMIC RESPIRATORY FAILURE: ICD-10-CM

## 2022-05-18 PROCEDURE — 99213 PR OFFICE/OUTPT VISIT, EST, LEVL III, 20-29 MIN: ICD-10-PCS | Mod: S$GLB,,, | Performed by: NURSE PRACTITIONER

## 2022-05-18 PROCEDURE — 99213 OFFICE O/P EST LOW 20 MIN: CPT | Mod: S$GLB,,, | Performed by: NURSE PRACTITIONER

## 2022-05-18 NOTE — PROGRESS NOTES
SUBJECTIVE:    Patient ID: Ling Rapp is a 76 y.o. female.    Chief Complaint: Apnea     Patient here today feeling well. She is using Qvar. She does get short of breath with exertion. She is wearing her oxygen all of the time. She sleeps on her CPAP with oxygen bled in.   She has established care with new rheumatologist for her RA.  She is still on Enbrel and Plaquenil.       Past Medical History:   Diagnosis Date    ALLERGIC RHINITIS     Anticoagulant long-term use     Asthma     Cataract     Former smoker     GERD (gastroesophageal reflux disease)     Hemorrhoids     Hypertension     DANE (obstructive sleep apnea)     Osteopenia     Pancreatitis     secondary to severe hypertriglyceridemia    Peptic ulcer     Rheumatoid arthritis     Vitamin D deficiency      Past Surgical History:   Procedure Laterality Date    Athroscopy(knee)      BREAST BIOPSY Left 2017    benign    CARDIAC CATHETERIZATION      CATARACT EXTRACTION Left 2013    COLONOSCOPY N/A 3/30/2016    Procedure: COLONOSCOPY;  Surgeon: Thee Sorenson MD;  [facility];  Service: Endoscopy;  Laterality: N/A;    EYE SURGERY      cataracts bilateral     FRACTURE SURGERY  1997    right knee     HYSTERECTOMY  1982    PARTIAL HYSTERECTOMY      w/ USO    TONSILLECTOMY      TRANSFORAMINAL EPIDURAL INJECTION OF STEROID Right 4/15/2019    Procedure: Injection,steroid,epidural,transforaminal approach;  Surgeon: Fernando Jenkins MD;  Location: [facility] OR;  Service: Pain Management;  Laterality: Right;  L4-5, L5-S1    UPPER GASTROINTESTINAL ENDOSCOPY  5/2013     Family History   Problem Relation Age of Onset    Colon polyps Sister 64    Ulcers Father     Emphysema Father         smoker    Alcohol abuse Father     Thyroid disease Mother     Heart disease Mother     Emphysema Mother         smoker    Alzheimer's disease Brother     Stroke Brother     Cancer Brother     Throat cancer Brother         non smoker    No  Known Problems Son     Diabetes Maternal Uncle         1/2 brother    Cancer Paternal Aunt     Early death Paternal Aunt     Leukemia Paternal Aunt         highschool    Breast cancer Paternal Aunt     Heart disease Paternal Uncle     Rheum arthritis Maternal Grandmother     Pneumonia Maternal Grandfather     Cancer Paternal Grandmother     Breast cancer Paternal Grandmother     Early death Paternal Grandfather          killed in line of duty        Social History:   Marital Status:   Occupation:retired    Alcohol History:  reports no history of alcohol use.  Tobacco History:  reports that she quit smoking about 50 years ago. Her smoking use included cigarettes. She has a 10.00 pack-year smoking history. She has never used smokeless tobacco.  Drug History:  reports no history of drug use.    Review of patient's allergies indicates:   Allergen Reactions    Amoxicillin-pot clavulanate Diarrhea and Other (See Comments)     Sever cramping    Diltiazem Other (See Comments)     Extreme dry moth    Fenofibrate Other (See Comments)     Other reaction(s): Itching    Hydroxychloroquine      Other reaction(s): eye accomodation problems    Leflunomide      Other reaction(s): abd pains    Lisinopril-hydrochlorothiazide      Other reaction(s): diarrhea  Other reaction(s): cramps    Methotrexate      Other reaction(s): pancreatitis    Norvasc [amlodipine] Swelling    Sulfa (sulfonamide antibiotics)      Other reaction(s): Swelling  Other reaction(s): Hives    Sulfamethoxazole-trimethoprim        Current Outpatient Medications   Medication Sig Dispense Refill    albuterol (PROAIR HFA) 90 mcg/actuation inhaler Inhale 2 puffs into the lungs every 6 (six) hours as needed for Wheezing. 3 Inhaler 5    artificial saliva, yerbas-lyt, SprA Use as directed 240 mL 11    aspirin 81 mg Tab Take 81 mg by mouth once daily. Every day      atenolol (TENORMIN) 100 MG tablet TAKE 1 TABLET DAILY 90 tablet 2     azelastine (ASTELIN) 137 mcg (0.1 %) nasal spray 1 spray by Nasal route 2 (two) times daily.      benzonatate (TESSALON) 200 MG capsule Take 200 mg by mouth 3 (three) times daily as needed for Cough.      CALCIUM CARBONATE/VITAMIN D3 (OS-ADDY 500 + D) 500-125 mg-unit Tab Take 1 tablet by mouth once daily. Twice a day      cetirizine (ZYRTEC) 10 MG tablet Take 10 mg by mouth once daily. Every day      cholecalciferol, vitamin D3, 5,000 unit capsule Take 5,000 Units by mouth once daily. 5 a week      cyanocobalamin (VITAMIN B-12) 100 MCG tablet Take 100 mcg by mouth once daily.      diclofenac sodium (VOLTAREN) 1 % Gel Apply 2 g topically once daily.      entanercept (ENBREL) 50 mg/mL injection 50 mg once a week. once weekly      fluticasone (FLONASE) 50 mcg/actuation nasal spray 2 sprays by Each Nare route once daily. Every day 48 g 3    hydrOXYchloroQUINE (PLAQUENIL) 200 mg tablet TAKE 1 TABLET BY MOUTH EVERY DAY 30 tablet 1    magnesium oxide (MAG-OX) 400 mg tablet Take 400 mg by mouth once daily.      montelukast (SINGULAIR) 10 mg tablet Take 1 tablet (10 mg total) by mouth every evening. 90 tablet 3    olmesartan (BENICAR) 40 MG tablet Take 40 mg by mouth once daily.      omeprazole (PRILOSEC) 40 MG capsule TAKE 1 CAPSULE TWICE DAILY BEFORE MEALS 180 capsule 2    spironolactone (ALDACTONE) 50 MG tablet       UBIDECARENONE (COQ-10 ORAL) Take 1 capsule by mouth once daily.       fluticasone furoate-vilanteroL (BREO ELLIPTA) 100-25 mcg/dose diskus inhaler Inhale 1 puff into the lungs once daily. Controller Rinse after you use it 3 each 6     No current facility-administered medications for this visit.       Last PFT: 04/2021 mild diffusion defect, no obstruction, no restriction    Last Ct of chest 04/2021  IMPRESSION:  1. No acute cardiac or pulmonary process.  2. Unchanged juxta fissural nodule in the right upper lobe (seen on  studies dating back to 6/10/2016).  3. Mild peripheral basilar  predominant interlobular septal thickening.  4. Mild cylindrical basilar predominant bronchiectasis      General:feeling well   Eyes: Vision is good.  ENT:  Post nasal drip  Heart:: No chest pain or palpitations.  Lungs: not coughing, dyspnea with exertion  GI: reflux controlled with Prilosec  : No dysuria, hesitancy, or nocturia.  Musculoskeletal:joint pain ok at present time, RA under control  Skin: No lesions or rashes.  Neuro: No headaches or neuropathy.  Lymph: swelling to legs better   Psych: No anxiety or depression.  Endo: weight stable      OBJECTIVE:      BP (!) 150/70 (BP Location: Left arm, Patient Position: Sitting, BP Method: Large (Manual))   Pulse 66   Ht 5' (1.524 m)   Wt 99.8 kg (220 lb)   SpO2 96% Comment: 2lpm  BMI 42.97 kg/m²     Physical Exam  GENERAL: Older patient in no distress.  HEENT: Pupils equal and reactive. Extraocular movements intact. Nose intact.      Pharynx moist.  NECK: Supple.   HEART: Regular rate and rhythm. No murmur or gallop auscultated.  LUNGS:  Crackles from bases up to shoulder blades    ABDOMEN: obese, Bowel sounds present. Non-tender, no masses palpated.  EXTREMITIES: Normal muscle tone and joint movement, clubbed nails  LYMPHATICS: No adenopathy palpated, 1+ edema.  SKIN: Dry, intact, no lesions.   NEURO: Cranial nerves II-XII intact. Motor strength 5/5 bilaterally, upper and lower extremities.  PSYCH: Appropriate affect.    Assessment:       1. Asthma, unspecified asthma severity, unspecified whether complicated, unspecified whether persistent    2. Rheumatoid arthritis, involving unspecified site, unspecified whether rheumatoid factor present    3. DANE (obstructive sleep apnea)    4. Chronic hypoxemic respiratory failure          Plan:             Start the Breo daily, call me with an update.   Continue the oxygen  Keep sleeping on your CPAP with oxygen bled in  Keep taking the diuretics

## 2022-05-18 NOTE — PATIENT INSTRUCTIONS
Start the Breo daily, call me with an update  Continue the oxygen  Keep sleeping on your CPAP with oxygen bled in  Keep taking the diuretics

## 2022-08-15 ENCOUNTER — TELEPHONE (OUTPATIENT)
Dept: FAMILY MEDICINE | Facility: CLINIC | Age: 77
End: 2022-08-15
Payer: MEDICARE

## 2022-08-15 NOTE — TELEPHONE ENCOUNTER
----- Message from Megan Koenig sent at 8/15/2022  1:24 PM CDT -----  Who Called: Patient    What is the reqeust in detail: Requesting call back to have an xray ordered of her knee/leg which the patient would like to have completed prior to her appointment. Please advise.     Can the clinic reply by [facility]? No    Best Call Back Number: [phone]    Additional Information:

## 2022-08-15 NOTE — TELEPHONE ENCOUNTER
Spoke with patient she fell a week ago and is still having knee and leg pain with swelling.  referred patient to [place] [facility] urgent care.

## 2022-08-16 ENCOUNTER — HOSPITAL ENCOUNTER (OUTPATIENT)
Dept: RADIOLOGY | Facility: HOSPITAL | Age: 77
Discharge: HOME | End: 2022-08-16
Attending: STUDENT IN AN ORGANIZED HEALTH CARE EDUCATION/TRAINING PROGRAM
Payer: MEDICARE

## 2022-08-16 ENCOUNTER — OFFICE VISIT (OUTPATIENT)
Dept: URGENT CARE | Facility: CLINIC | Age: 77
End: 2022-08-16
Payer: MEDICARE

## 2022-08-16 VITALS
HEART RATE: 59 BPM | RESPIRATION RATE: 16 BRPM | DIASTOLIC BLOOD PRESSURE: 75 MMHG | SYSTOLIC BLOOD PRESSURE: 158 MMHG | TEMPERATURE: 98 F | OXYGEN SATURATION: 90 %

## 2022-08-16 DIAGNOSIS — M25.562 BILATERAL ANTERIOR KNEE PAIN: ICD-10-CM

## 2022-08-16 DIAGNOSIS — M25.561 BILATERAL ANTERIOR KNEE PAIN: ICD-10-CM
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
## 2022-08-16 PROCEDURE — 73562 X-RAY EXAM OF KNEE 3: CPT | Mod: TC,50

## 2022-08-16 PROCEDURE — 99204 OFFICE O/P NEW MOD 45 MIN: CPT | Mod: S$GLB,,, | Performed by: STUDENT IN AN ORGANIZED HEALTH CARE EDUCATION/TRAINING PROGRAM

## 2022-08-16 PROCEDURE — 73562 XR KNEE 3 VIEW BILATERAL: ICD-10-PCS | Mod: 26,50,, | Performed by: RADIOLOGY

## 2022-08-16 PROCEDURE — 99204 PR OFFICE/OUTPT VISIT, NEW, LEVL IV, 45-59 MIN: ICD-10-PCS | Mod: S$GLB,,, | Performed by: STUDENT IN AN ORGANIZED HEALTH CARE EDUCATION/TRAINING PROGRAM

## 2022-08-16 PROCEDURE — 73562 X-RAY EXAM OF KNEE 3: CPT | Mod: 26,50,, | Performed by: RADIOLOGY

## 2022-08-16 NOTE — PROGRESS NOTES
Subjective:       Patient ID: Ling Rapp is a 76 y.o. female.    Vitals:  oral temperature is 97.6 °F (36.4 °C). Her blood pressure is 158/75 (abnormal) and her pulse is 59 (abnormal). Her respiration is 16 and oxygen saturation is 90% (abnormal).     Chief Complaint: Knee Injury    Patient is a 76-year-old female with a past medical history of allergic rhinitis, asthma, chronic respiratory failure, hypertension, hyperlipidemia, rheumatoid arthritis, obstructive sleep apnea, GERD, and peptic ulcer disease who presents to clinic for evaluation of knee injury.  Patient reports she had a fall approximately 1 week ago.  Patient states she will occasionally fall however catch herself.  Patient states was walking out the door when she lost her footing and fell forward.  Patient states was able to catch herself on the wall however slid to her knees.  Patient states primarily landed on her left knee.  Patient states that 1 week ago she had a bruise on her left knee however that has resolved.  Patient states now remains with some swelling and pain to the left knee.  Patient states her right knee does have some tenderness however it is not as bad as the left.  Patient states did not have any bruise on the right knee.  Patient denies any open wounds to the knees.  Patient states she does have some decreased range of motion of her knees however that is because of her arthritis.  Patient states has taking previously prescribed tramadol with relief of symptoms.  Patient states nothing helps as much as when her rheumatologist injects her knees.  Patient states she primarily wants an x-ray to make sure she did not break anything.  Patient reports pain to be a constant sharp sensation.  Patient reports pain at rest to be about a 1 or 2 on a 10 scale but states at its worst is a 6 or 7 on a 10 scale.  Patient states pain is aggravated by palpation, weight-bearing, and ambulation.  Patient states pain is alleviated by rest  and tramadol.  Patient denies any abnormal sensation to including numbness or tingling of the bilateral lower extremities.  Patient states did not experience any loss of consciousness with her fall.  Patient states did not strike her head or neck nor have any head or neck pain with the fall.  Patient accompanied to visit via her spouse.    Knee Injury  This is a new problem. The current episode started in the past 7 days. Associated symptoms include arthralgias (Bilateral knee) and joint swelling (Left knee). Pertinent negatives include no abdominal pain, chest pain, chills, coughing, fever, headaches, nausea, neck pain, numbness or vomiting.       Constitution: Positive for unexpected weight change (Chronic). Negative for chills and fever.   HENT: Negative.    Neck: neck negative. Negative for neck pain and neck swelling.   Cardiovascular: Negative.  Negative for chest pain.   Eyes: Negative.    Respiratory: Negative.  Negative for cough and shortness of breath.    Gastrointestinal: Negative.  Negative for abdominal pain, nausea, vomiting and diarrhea.   Endocrine: negative.   Genitourinary: Negative.    Musculoskeletal: Positive for trauma (Fall 1 week ago), joint pain (Bilateral knee), joint swelling (Left knee) and abnormal ROM of joint (Bilateral knees).   Skin: Negative.  Negative for wound, erythema and bruising (Reports resolved).   Allergic/Immunologic: Negative.    Neurological: Negative for dizziness, headaches, disorientation, altered mental status, numbness and tingling.   Hematologic/Lymphatic: Negative.    Psychiatric/Behavioral: Negative.  Negative for altered mental status, disorientation and confusion.       Objective:      Physical Exam   Constitutional: She is oriented to person, place, and time. She appears well-developed. She is cooperative.  Non-toxic appearance. She does not appear ill. No distress.   HENT:   Head: Normocephalic and atraumatic.   Ears:   Right Ear: Hearing, tympanic membrane,  external ear and ear canal normal.   Left Ear: Hearing, tympanic membrane, external ear and ear canal normal.   Nose: Nose normal. No mucosal edema, rhinorrhea or nasal deformity. No epistaxis. Right sinus exhibits no maxillary sinus tenderness and no frontal sinus tenderness. Left sinus exhibits no maxillary sinus tenderness and no frontal sinus tenderness.   Mouth/Throat: Uvula is midline, oropharynx is clear and moist and mucous membranes are normal. No trismus in the jaw. Normal dentition. No uvula swelling. No posterior oropharyngeal erythema.   Eyes: Conjunctivae and lids are normal. Pupils are equal, round, and reactive to light. Right eye exhibits no discharge. Left eye exhibits no discharge. No scleral icterus.   Neck: Trachea normal and phonation normal. Neck supple.   Cardiovascular: Regular rhythm and normal pulses. Tachycardia present.   Pulmonary/Chest: Effort normal and breath sounds normal. No respiratory distress (On oxygen via nasal cannula). She has no wheezes. She has no rhonchi. She has no rales.   Abdominal: Normal appearance and bowel sounds are normal. She exhibits no distension. Soft. There is no abdominal tenderness.   Musculoskeletal:         General: No deformity.      Right knee: She exhibits decreased range of motion. She exhibits no swelling, normal alignment and normal patellar mobility. Tenderness found.      Left knee: She exhibits decreased range of motion and swelling (Mild). She exhibits normal alignment and normal patellar mobility. Tenderness found.   Neurological: She is alert and oriented to person, place, and time. She displays weakness (Generalized weakness). She exhibits normal muscle tone. Gait (Use of assistive devices) abnormal. Coordination normal.   Skin: Skin is warm, dry, intact, not diaphoretic, not pale and no rash. Capillary refill takes 2 to 3 seconds. No bruising and No erythema   Psychiatric: Her speech is normal and behavior is normal. Judgment and thought  content normal.   Nursing note and vitals reviewed.        Assessment:       1. Fall, initial encounter    2. Bilateral anterior knee pain          Plan:         Fall, initial encounter  -     XR KNEE 3 VIEW BILATERAL; Future; Expected date: 08/16/2022    Bilateral anterior knee pain  -     XR KNEE 3 VIEW BILATERAL; Future; Expected date: 08/16/2022                 Radiology not available in clinic at current.  Patient provided with stat outpatient bilateral knee x-rays.  Continue previously prescribed medications as directed.    Recommend rotating ice and warm moist heat as directed.    Follow-up with PCP in 1-2 days.    Recommend following up with rheumatologist or orthopedic in 1-2 weeks if symptoms continue or worsen.  Patient advised unable to inject knees in clinic as her rheumatologist or orthopedic would have to do this.    Return to clinic as needed.    To ED for any new acutely worsening symptoms.    Patient in agreement with plan of care.    DISCLAIMER: Please note that my documentation in this Electronic Healthcare Record was produced using speech recognition software and therefore may contain errors related to that software system.These could include grammar, punctuation and spelling errors or the inclusion/exclusion of phrases that were not intended. Garbled syntax, mangled pronouns, and other bizarre constructions may be attributed to that software system.

## 2022-11-16 ENCOUNTER — OFFICE VISIT (OUTPATIENT)
Dept: PULMONOLOGY | Facility: CLINIC | Age: 77
End: 2022-11-16
Payer: MEDICARE

## 2022-11-16 VITALS
OXYGEN SATURATION: 96 % | BODY MASS INDEX: 43.36 KG/M2 | TEMPERATURE: 98 F | WEIGHT: 222 LBS | SYSTOLIC BLOOD PRESSURE: 160 MMHG | DIASTOLIC BLOOD PRESSURE: 78 MMHG | HEART RATE: 56 BPM

## 2022-11-16 DIAGNOSIS — J45.909 ASTHMA, UNSPECIFIED ASTHMA SEVERITY, UNSPECIFIED WHETHER COMPLICATED, UNSPECIFIED WHETHER PERSISTENT: Primary | ICD-10-CM

## 2022-11-16 DIAGNOSIS — J84.9 INTERSTITIAL PULMONARY DISEASE, UNSPECIFIED: ICD-10-CM

## 2022-11-16 DIAGNOSIS — M06.9 RHEUMATOID ARTHRITIS, INVOLVING UNSPECIFIED SITE, UNSPECIFIED WHETHER RHEUMATOID FACTOR PRESENT: ICD-10-CM

## 2022-11-16 PROCEDURE — 99213 OFFICE O/P EST LOW 20 MIN: CPT | Mod: S$GLB,,, | Performed by: NURSE PRACTITIONER

## 2022-11-16 PROCEDURE — 99213 PR OFFICE/OUTPT VISIT, EST, LEVL III, 20-29 MIN: ICD-10-PCS | Mod: S$GLB,,, | Performed by: NURSE PRACTITIONER

## 2022-11-16 NOTE — PROGRESS NOTES
SUBJECTIVE:    Patient ID: Ling Rapp is a 77 y.o. female.    Chief Complaint: Apnea, Asthma, and Follow-up (6 month follow up asthma, DANE)     Patient here today feeling well. She is using Breo daily. She does get short of breath with exertion. She is wearing her oxygen all of the time. She sleeps on her CPAP with oxygen bled in.   She has established care with new rheumatologist for her RA.  She is still on Enbrel. Her Compliance report shows that she is 100% compliant sleeps an average of 9 hour and 5 minutes with an AHI of 2.3.      Past Medical History:   Diagnosis Date    ALLERGIC RHINITIS     Anticoagulant long-term use     Asthma     Cataract     Former smoker     GERD (gastroesophageal reflux disease)     Hemorrhoids     Hypertension     DANE (obstructive sleep apnea)     Osteopenia     Pancreatitis     secondary to severe hypertriglyceridemia    Peptic ulcer     Rheumatoid arthritis     Vitamin D deficiency      Past Surgical History:   Procedure Laterality Date    Athroscopy(knee)      BREAST BIOPSY Left 2017    benign    CARDIAC CATHETERIZATION      CATARACT EXTRACTION Left 2013    COLONOSCOPY N/A 3/30/2016    Procedure: COLONOSCOPY;  Surgeon: Thee Sorenson MD;  Location: [facility];  Service: Endoscopy;  Laterality: N/A;    EYE SURGERY      cataracts bilateral     FRACTURE SURGERY  1997    right knee     HYSTERECTOMY  1982    PARTIAL HYSTERECTOMY      w/ USO    TONSILLECTOMY      TRANSFORAMINAL EPIDURAL INJECTION OF STEROID Right 4/15/2019    Procedure: Injection,steroid,epidural,transforaminal approach;  Surgeon: Fernando Jenkins MD;  Location: [facility] OR;  Service: Pain Management;  Laterality: Right;  L4-5, L5-S1    UPPER GASTROINTESTINAL ENDOSCOPY  5/2013     Family History   Problem Relation Age of Onset    Colon polyps Sister 64    Ulcers Father     Emphysema Father         smoker    Alcohol abuse Father     Thyroid disease Mother     Heart disease Mother     Emphysema Mother          smoker    Alzheimer's disease Brother     Stroke Brother     Cancer Brother     Throat cancer Brother         non smoker    No Known Problems Son     Diabetes Maternal Uncle         1/2 brother    Cancer Paternal Aunt     Early death Paternal Aunt     Leukemia Paternal Aunt         highschool    Breast cancer Paternal Aunt     Heart disease Paternal Uncle     Rheum arthritis Maternal Grandmother     Pneumonia Maternal Grandfather     Cancer Paternal Grandmother     Breast cancer Paternal Grandmother     Early death Paternal Grandfather          killed in line of duty        Social History:   Marital Status:   Occupation:retired    Alcohol History:  reports no history of alcohol use.  Tobacco History:  reports that she quit smoking about 50 years ago. Her smoking use included cigarettes. She has a 10.00 pack-year smoking history. She has never used smokeless tobacco.  Drug History:  reports no history of drug use.    Review of patient's allergies indicates:   Allergen Reactions    Amoxicillin-pot clavulanate Diarrhea and Other (See Comments)     Sever cramping    Diltiazem Other (See Comments)     Extreme dry moth    Fenofibrate Other (See Comments)     Other reaction(s): Itching    Hydroxychloroquine      Other reaction(s): eye accomodation problems    Leflunomide      Other reaction(s): abd pains    Lisinopril-hydrochlorothiazide      Other reaction(s): diarrhea  Other reaction(s): cramps    Methotrexate      Other reaction(s): pancreatitis    Norvasc [amlodipine] Swelling    Sulfa (sulfonamide antibiotics)      Other reaction(s): Swelling  Other reaction(s): Hives    Sulfamethoxazole-trimethoprim        Current Outpatient Medications   Medication Sig Dispense Refill    albuterol (PROAIR HFA) 90 mcg/actuation inhaler Inhale 2 puffs into the lungs every 6 (six) hours as needed for Wheezing. 3 Inhaler 5    artificial saliva, yerbas-lyt, SprA Use as directed 240 mL 11    aspirin 81 mg Tab Take 81 mg  by mouth once daily. Every day      atenolol (TENORMIN) 100 MG tablet TAKE 1 TABLET DAILY 90 tablet 2    azelastine (ASTELIN) 137 mcg (0.1 %) nasal spray 1 spray by Nasal route 2 (two) times daily.      BREO ELLIPTA 100-25 mcg/dose diskus inhaler INHALE 1 PUFF INTO THE LUNGS ONCE DAILY. CONTROLLER RINSE AFTER YOU USE  each 6    CALCIUM CARBONATE/VITAMIN D3 (OS-ADDY 500 + D) 500-125 mg-unit Tab Take 1 tablet by mouth once daily. Twice a day      cetirizine (ZYRTEC) 10 MG tablet Take 10 mg by mouth once daily. Every day      cholecalciferol, vitamin D3, 5,000 unit capsule Take 5,000 Units by mouth once daily. 5 a week      cyanocobalamin (VITAMIN B-12) 100 MCG tablet Take 100 mcg by mouth once daily.      diclofenac sodium (VOLTAREN) 1 % Gel Apply 2 g topically once daily.      entanercept (ENBREL) 50 mg/mL injection 50 mg once a week. once weekly      fluticasone (FLONASE) 50 mcg/actuation nasal spray 2 sprays by Each Nare route once daily. Every day 48 g 3    magnesium oxide (MAG-OX) 400 mg tablet Take 400 mg by mouth once daily.      montelukast (SINGULAIR) 10 mg tablet TAKE 1 TABLET BY MOUTH EVERY DAY IN THE EVENING 90 tablet 3    olmesartan (BENICAR) 40 MG tablet Take 40 mg by mouth once daily.      omeprazole (PRILOSEC) 40 MG capsule TAKE 1 CAPSULE TWICE DAILY BEFORE MEALS 180 capsule 2    spironolactone (ALDACTONE) 50 MG tablet       UBIDECARENONE (COQ-10 ORAL) Take 1 capsule by mouth once daily.       benzonatate (TESSALON) 200 MG capsule Take 200 mg by mouth 3 (three) times daily as needed for Cough.      hydrOXYchloroQUINE (PLAQUENIL) 200 mg tablet TAKE 1 TABLET BY MOUTH EVERY DAY (Patient not taking: Reported on 11/16/2022) 30 tablet 1     No current facility-administered medications for this visit.       Last PFT: 04/2021 mild diffusion defect, no obstruction, no restriction    Last Ct of chest 04/2021  IMPRESSION:  1. No acute cardiac or pulmonary process.  2. Unchanged juxta fissural nodule in the  right upper lobe (seen on  studies dating back to 6/10/2016).  3. Mild peripheral basilar predominant interlobular septal thickening.  4. Mild cylindrical basilar predominant bronchiectasis      General:feeling well   Eyes: Vision is good.  ENT:  Post nasal drip  Heart:: No chest pain or palpitations.  Lungs: not coughing, dyspnea with exertion  GI: reflux controlled with Prilosec  : No dysuria, hesitancy, or nocturia.  Musculoskeletal:joint pain ok at present time, RA under control  Skin: No lesions or rashes.  Neuro: No headaches or neuropathy.  Lymph: swelling to legs better   Psych: No anxiety or depression.  Endo: weight stable      OBJECTIVE:      BP (!) 160/78 (BP Location: Right arm, Patient Position: Sitting, BP Method: Medium (Manual))   Pulse (!) 56   Temp 98.2 °F (36.8 °C)   Wt 100.7 kg (222 lb)   SpO2 96% Comment: 2 lts  BMI 43.36 kg/m²     Physical Exam  GENERAL: Older patient in no distress.  HEENT: Pupils equal and reactive. Extraocular movements intact. Nose intact.      Pharynx moist.  NECK: Supple.   HEART: Regular rate and rhythm. No murmur or gallop auscultated.  LUNGS:  Crackles from bases up to shoulder blades    ABDOMEN: obese, Bowel sounds present. Non-tender, no masses palpated.  EXTREMITIES: Normal muscle tone and joint movement, clubbed nails  LYMPHATICS: No adenopathy palpated, 1+ edema.  SKIN: Dry, intact, no lesions.   NEURO: Cranial nerves II-XII intact. Motor strength 5/5 bilaterally, upper and lower extremities.  PSYCH: Appropriate affect.    Assessment:       1. Asthma, unspecified asthma severity, unspecified whether complicated, unspecified whether persistent    2. Rheumatoid arthritis, involving unspecified site, unspecified whether rheumatoid factor present    3. Interstitial pulmonary disease, unspecified            Plan:             Start the Breo daily, call me with an update.   Continue the oxygen  Keep sleeping on your CPAP with oxygen bled in  Keep taking the  diuretics   PFT   CT chest   Get last ECHO from Dr. Benito

## 2022-11-30 ENCOUNTER — PATIENT MESSAGE (OUTPATIENT)
Dept: PULMONOLOGY | Facility: CLINIC | Age: 77
End: 2022-11-30

## 2022-11-30 ENCOUNTER — TELEPHONE (OUTPATIENT)
Dept: PULMONOLOGY | Facility: CLINIC | Age: 77
End: 2022-11-30

## 2022-11-30 ENCOUNTER — HOSPITAL ENCOUNTER (OUTPATIENT)
Dept: PULMONOLOGY | Facility: HOSPITAL | Age: 77
Discharge: HOME | End: 2022-11-30
Attending: NURSE PRACTITIONER
Payer: MEDICARE

## 2022-11-30 ENCOUNTER — HOSPITAL ENCOUNTER (OUTPATIENT)
Dept: RADIOLOGY | Facility: HOSPITAL | Age: 77
Discharge: HOME | End: 2022-11-30
Attending: NURSE PRACTITIONER
Payer: MEDICARE

## 2022-11-30 DIAGNOSIS — J84.9 INTERSTITIAL PULMONARY DISEASE, UNSPECIFIED: ICD-10-CM

## 2022-11-30 DIAGNOSIS — J45.909 ASTHMA, UNSPECIFIED ASTHMA SEVERITY, UNSPECIFIED WHETHER COMPLICATED, UNSPECIFIED WHETHER PERSISTENT: ICD-10-CM

## 2022-11-30 DIAGNOSIS — M06.9 RHEUMATOID ARTHRITIS, INVOLVING UNSPECIFIED SITE, UNSPECIFIED WHETHER RHEUMATOID FACTOR PRESENT: ICD-10-CM

## 2022-11-30 PROCEDURE — 94010 BREATHING CAPACITY TEST: CPT

## 2022-11-30 PROCEDURE — 94727 GAS DIL/WSHOT DETER LNG VOL: CPT

## 2022-11-30 PROCEDURE — 71250 CT THORAX DX C-: CPT | Mod: TC

## 2022-11-30 PROCEDURE — 94729 DIFFUSING CAPACITY: CPT

## 2022-11-30 NOTE — TELEPHONE ENCOUNTER
Ct chest   IMPRESSION:  Stable cardiomegaly with mild increase interstitial markings angiographic groundglass opacities     Stable fissural nodule in the right upper lobe     No new nodules or infiltrates

## 2023-01-23 ENCOUNTER — PATIENT MESSAGE (OUTPATIENT)
Dept: FAMILY MEDICINE | Facility: CLINIC | Age: 78
End: 2023-01-23
Payer: MEDICARE

## 2023-01-23 DIAGNOSIS — Z12.31 VISIT FOR SCREENING MAMMOGRAM: Primary | ICD-10-CM

## 2023-02-02 ENCOUNTER — HOSPITAL ENCOUNTER (OUTPATIENT)
Dept: RADIOLOGY | Facility: CLINIC | Age: 78
Discharge: HOME | End: 2023-02-02
Attending: FAMILY MEDICINE
Payer: MEDICARE

## 2023-02-02 DIAGNOSIS — Z12.31 VISIT FOR SCREENING MAMMOGRAM: ICD-10-CM

## 2023-02-02 PROCEDURE — 77063 MAMMO DIGITAL SCREENING BILAT WITH TOMO: ICD-10-PCS | Mod: 26,,, | Performed by: RADIOLOGY

## 2023-02-02 PROCEDURE — 77067 MAMMO DIGITAL SCREENING BILAT WITH TOMO: ICD-10-PCS | Mod: 26,,, | Performed by: RADIOLOGY

## 2023-02-02 PROCEDURE — 77063 BREAST TOMOSYNTHESIS BI: CPT | Mod: 26,,, | Performed by: RADIOLOGY

## 2023-02-02 PROCEDURE — 77067 SCR MAMMO BI INCL CAD: CPT | Mod: 26,,, | Performed by: RADIOLOGY

## 2023-02-02 PROCEDURE — 77067 SCR MAMMO BI INCL CAD: CPT | Mod: TC,PO

## 2023-03-08 ENCOUNTER — OFFICE VISIT (OUTPATIENT)
Dept: FAMILY MEDICINE | Facility: CLINIC | Age: 78
End: 2023-03-08
Attending: FAMILY MEDICINE
Payer: MEDICARE

## 2023-03-08 VITALS
BODY MASS INDEX: 44.16 KG/M2 | HEART RATE: 58 BPM | RESPIRATION RATE: 17 BRPM | DIASTOLIC BLOOD PRESSURE: 80 MMHG | TEMPERATURE: 98 F | WEIGHT: 224.94 LBS | HEIGHT: 60 IN | SYSTOLIC BLOOD PRESSURE: 134 MMHG | OXYGEN SATURATION: 97 %

## 2023-03-08 DIAGNOSIS — R91.1 PULMONARY NODULE: ICD-10-CM

## 2023-03-08 DIAGNOSIS — J43.2 CENTRILOBULAR EMPHYSEMA: ICD-10-CM

## 2023-03-08 DIAGNOSIS — E55.9 VITAMIN D DEFICIENCY: ICD-10-CM

## 2023-03-08 DIAGNOSIS — J84.9 INTERSTITIAL PULMONARY DISEASE, UNSPECIFIED: ICD-10-CM

## 2023-03-08 DIAGNOSIS — J45.31 MILD PERSISTENT ASTHMA WITH EXACERBATION: ICD-10-CM

## 2023-03-08 DIAGNOSIS — I87.2 VENOUS INSUFFICIENCY: ICD-10-CM

## 2023-03-08 DIAGNOSIS — J30.9 CHRONIC ALLERGIC RHINITIS: ICD-10-CM

## 2023-03-08 DIAGNOSIS — M06.9 RHEUMATOID ARTHRITIS, INVOLVING UNSPECIFIED SITE, UNSPECIFIED WHETHER RHEUMATOID FACTOR PRESENT: Primary | ICD-10-CM

## 2023-03-08 DIAGNOSIS — I70.0 AORTIC ATHEROSCLEROSIS: ICD-10-CM

## 2023-03-08 DIAGNOSIS — E66.01 MORBID OBESITY WITH BMI OF 40.0-44.9, ADULT: ICD-10-CM

## 2023-03-08 DIAGNOSIS — M54.16 LUMBAR RADICULITIS: ICD-10-CM

## 2023-03-08 DIAGNOSIS — I10 PRIMARY HYPERTENSION: ICD-10-CM

## 2023-03-08 DIAGNOSIS — R63.5 ABNORMAL WEIGHT GAIN: ICD-10-CM

## 2023-03-08 DIAGNOSIS — E78.5 HYPERLIPIDEMIA, UNSPECIFIED HYPERLIPIDEMIA TYPE: ICD-10-CM

## 2023-03-08 DIAGNOSIS — G47.33 OSA (OBSTRUCTIVE SLEEP APNEA): ICD-10-CM

## 2023-03-08 DIAGNOSIS — R73.03 PREDIABETES: ICD-10-CM

## 2023-03-08 PROCEDURE — 99999 PR PBB SHADOW E&M-EST. PATIENT-LVL V: CPT | Mod: PBBFAC,,, | Performed by: FAMILY MEDICINE

## 2023-03-08 PROCEDURE — 99215 OFFICE O/P EST HI 40 MIN: CPT | Mod: PBBFAC,PN | Performed by: FAMILY MEDICINE

## 2023-03-08 PROCEDURE — 99214 PR OFFICE/OUTPT VISIT, EST, LEVL IV, 30-39 MIN: ICD-10-PCS | Mod: S$PBB,,, | Performed by: FAMILY MEDICINE

## 2023-03-08 PROCEDURE — 99214 OFFICE O/P EST MOD 30 MIN: CPT | Mod: S$PBB,,, | Performed by: FAMILY MEDICINE

## 2023-03-08 PROCEDURE — 99999 PR PBB SHADOW E&M-EST. PATIENT-LVL V: ICD-10-PCS | Mod: PBBFAC,,, | Performed by: FAMILY MEDICINE

## 2023-03-08 RX ORDER — FLUTICASONE FUROATE AND VILANTEROL 100; 25 UG/1; UG/1
1 POWDER RESPIRATORY (INHALATION)
COMMUNITY

## 2023-03-08 RX ORDER — TRIAMCINOLONE ACETONIDE 1 MG/G
CREAM TOPICAL
COMMUNITY
Start: 2022-12-08

## 2023-03-08 NOTE — PROGRESS NOTES
Subjective:       Patient ID: Ling Rapp is a 77 y.o. female.    Chief Complaint: Annual Exam (Pt states that she is here for her annual exam )    77-year-old female coming in for follow-up of multiple medical problems.  She was last seen in November of 2021.  She has a history of lumbar radiculopathy which has been relatively quiet lately, she has chronic allergic rhinitis, mild persistent asthma, pulmonary nodules, hypertension, hyperlipidemia, venous insufficiency, vitamin-D deficiency, morbid obesity, sleep apnea, rheumatoid arthritis on Enbrel under the care of Dr. Mahajan/rheumatology and history of pancreatitis that was initially attributed to hypertriglyceridemia but may have been related to methotrexate used at the time.  She has been taken off of Plaquenil and is taking Enbrel 50 mg weekly.  She takes atenolol and Benicar with Aldactone for her high blood pressure and uses Singulair, ProAir rescue inhaler and Breo for her asthma.  She is followed by Pulmonary and Cardiology.  She has diffuse arthropathy predominantly affecting her knees with mixed rheumatoid and osteoarthritis.  She is followed regularly by Rheumatology.  She brings in a pile of old tests and imaging reports with multiple questions regarding what words and phrases mean and answers were provided to the best of my ability.  She has had some persistent interstitial lung disease showing on both chest x-ray and CT scans and has been getting regular CT scans for what is probably some rheumatoid lung disease as well as changes of emphysema.  She is no longer smoking but she did for many years and she grew up in a household with smoking parents.  She has aortic calcifications seen in the pulmonary vessels most recently in November of 2022.  She has been gaining weight and is concerned about a possible thyroid condition.    Past Medical History:  No date: ALLERGIC RHINITIS  No date: Anticoagulant long-term use  No date: Asthma  No date:  Cataract  No date: Former smoker  No date: GERD (gastroesophageal reflux disease)  No date: Hemorrhoids  No date: Hypertension  No date: DANE (obstructive sleep apnea)  No date: Osteopenia  No date: Pancreatitis      Comment:  secondary to severe hypertriglyceridemia  No date: Peptic ulcer  No date: Rheumatoid arthritis  No date: Vitamin D deficiency    Past Surgical History:  No date: Athroscopy(knee)  2017: BREAST BIOPSY; Left      Comment:  benign  No date: CARDIAC CATHETERIZATION  2013: CATARACT EXTRACTION; Left  3/30/2016: COLONOSCOPY; N/A      Comment:  Procedure: COLONOSCOPY;  Surgeon: Thee Sorenson MD;  Location: [facility];  Service: Endoscopy;                 Laterality: N/A;  No date: EYE SURGERY      Comment:  cataracts bilateral   : FRACTURE SURGERY      Comment:  right knee   : HYSTERECTOMY  No date: PARTIAL HYSTERECTOMY      Comment:  w/ USO  No date: TONSILLECTOMY  4/15/2019: TRANSFORAMINAL EPIDURAL INJECTION OF STEROID; Right      Comment:  Procedure: Injection,steroid,epidural,transforaminal                approach;  Surgeon: Fernando Jenkins MD;  Location: [facility];                 Service: Pain Management;  Laterality: Right;  L4-5,                L5-S1  2013: UPPER GASTROINTESTINAL ENDOSCOPY    Review of patient's family history indicates:    Social History    Tobacco Use      Smoking status: Former        Packs/day: 1.00        Years: 10.00        Pack years: 10        Types: Cigarettes        Quit date: 1972        Years since quittin.2      Smokeless tobacco: Never      Tobacco comments: quit     Alcohol use: No    Drug use: No    Current Outpatient Medications on File Prior to Visit:  albuterol (PROAIR HFA) 90 mcg/actuation inhaler, Inhale 2 puffs into the lungs every 6 (six) hours as needed for Wheezing., Disp: 3 Inhaler, Rfl: 5  artificial saliva, yerbas-lyt, SprA, Use as directed, Disp: 240 mL, Rfl: 11  aspirin 81 mg Tab, Take 81 mg by mouth once daily.  Every day, Disp: , Rfl:   atenolol (TENORMIN) 100 MG tablet, TAKE 1 TABLET DAILY, Disp: 90 tablet, Rfl: 2  azelastine (ASTELIN) 137 mcg (0.1 %) nasal spray, 1 spray by Nasal route 2 (two) times daily., Disp: , Rfl:   benzonatate (TESSALON) 200 MG capsule, Take 200 mg by mouth 3 (three) times daily as needed for Cough., Disp: , Rfl:   CALCIUM CARBONATE/VITAMIN D3 (OS-ADDY 500 + D) 500-125 mg-unit Tab, Take 1 tablet by mouth once daily. Twice a day, Disp: , Rfl:   cetirizine (ZYRTEC) 10 MG tablet, Take 10 mg by mouth once daily. Every day, Disp: , Rfl:   cholecalciferol, vitamin D3, 5,000 unit capsule, Take 5,000 Units by mouth once daily. 5 a week, Disp: , Rfl:   cyanocobalamin (VITAMIN B-12) 100 MCG tablet, Take 100 mcg by mouth once daily., Disp: , Rfl:   diclofenac sodium (VOLTAREN) 1 % Gel, Apply 2 g topically once daily., Disp: , Rfl:   entanercept (ENBREL) 50 mg/mL injection, 50 mg once a week. once weekly, Disp: , Rfl:   fluticasone (FLONASE) 50 mcg/actuation nasal spray, 2 sprays by Each Nare route once daily. Every day, Disp: 48 g, Rfl: 3  fluticasone furoate-vilanteroL (BREO) 100-25 mcg/dose diskus inhaler, Inhale 1 puff into the lungs., Disp: , Rfl:   magnesium oxide (MAG-OX) 400 mg tablet, Take 400 mg by mouth once daily., Disp: , Rfl:   montelukast (SINGULAIR) 10 mg tablet, TAKE 1 TABLET BY MOUTH EVERY DAY IN THE EVENING, Disp: 90 tablet, Rfl: 3  olmesartan (BENICAR) 40 MG tablet, Take 40 mg by mouth once daily., Disp: , Rfl:   omeprazole (PRILOSEC) 40 MG capsule, TAKE 1 CAPSULE TWICE DAILY BEFORE MEALS, Disp: 180 capsule, Rfl: 2  spironolactone (ALDACTONE) 50 MG tablet, , Disp: , Rfl:   triamcinolone acetonide 0.1% (KENALOG) 0.1 % cream, SMARTSIG:Sparingly Topical Twice Daily PRN, Disp: , Rfl:   UBIDECARENONE (COQ-10 ORAL), Take 1 capsule by mouth once daily. , Disp: , Rfl:   budesonide-formoterol 160-4.5 mcg (SYMBICORT) 160-4.5 mcg/actuation HFAA, Inhale 2 puffs into the lungs every 12 (twelve) hours.  (Patient not taking: Reported on 3/8/2023), Disp: 10.2 g, Rfl: 6  [DISCONTINUED] hydrOXYchloroQUINE (PLAQUENIL) 200 mg tablet, TAKE 1 TABLET BY MOUTH EVERY DAY (Patient not taking: Reported on 11/16/2022), Disp: 30 tablet, Rfl: 1    No current facility-administered medications on file prior to visit.        Review of Systems   Constitutional:  Positive for unexpected weight change. Negative for activity change, appetite change, chills, diaphoresis, fatigue and fever.   HENT:  Negative for congestion, ear pain, hearing loss, postnasal drip, sinus pressure, sneezing, sore throat, tinnitus and trouble swallowing.    Eyes:  Negative for itching and visual disturbance.   Respiratory:  Negative for cough, chest tightness, shortness of breath and wheezing.    Cardiovascular:  Negative for chest pain, palpitations and leg swelling.   Gastrointestinal:  Negative for abdominal pain, blood in stool, constipation, diarrhea, nausea and vomiting.   Genitourinary:  Negative for dysuria, frequency, hematuria, menstrual problem, pelvic pain, vaginal bleeding and vaginal discharge.   Musculoskeletal:  Positive for arthralgias, back pain and joint swelling. Negative for myalgias.   Neurological:  Negative for dizziness and headaches.   Hematological:  Negative for adenopathy.   Psychiatric/Behavioral:  Negative for sleep disturbance. The patient is not nervous/anxious.      Objective:      Physical Exam  Vitals and nursing note reviewed.   Constitutional:       General: She is not in acute distress.     Appearance: Normal appearance. She is well-developed. She is obese. She is not ill-appearing, toxic-appearing or diaphoretic.      Comments: Fair blood pressure control  Mild bradycardia with regular rhythm  Morbid obesity with a BMI of 43.9 she is up 15.8 lb from her last visit with me November 5, 2021   HENT:      Head: Normocephalic and atraumatic.      Right Ear: Tympanic membrane, ear canal and external ear normal. There is no  impacted cerumen.      Left Ear: Tympanic membrane, ear canal and external ear normal. There is no impacted cerumen.      Nose: Nose normal. No congestion or rhinorrhea.      Mouth/Throat:      Mouth: Mucous membranes are moist.      Pharynx: Oropharynx is clear. No oropharyngeal exudate or posterior oropharyngeal erythema.   Eyes:      General: No scleral icterus.        Right eye: No discharge.         Left eye: No discharge.      Extraocular Movements: Extraocular movements intact.      Conjunctiva/sclera: Conjunctivae normal.      Pupils: Pupils are equal, round, and reactive to light.   Neck:      Thyroid: No thyromegaly.      Vascular: No carotid bruit or JVD.   Cardiovascular:      Rate and Rhythm: Regular rhythm. Bradycardia present.      Pulses: Normal pulses.      Heart sounds: Normal heart sounds. No murmur heard.    No friction rub. No gallop.   Pulmonary:      Effort: Pulmonary effort is normal. No respiratory distress.      Breath sounds: Normal breath sounds. No stridor. No wheezing, rhonchi or rales.   Chest:      Chest wall: No tenderness.   Abdominal:      General: Bowel sounds are normal. There is no distension.      Palpations: Abdomen is soft. There is no mass.      Tenderness: There is no abdominal tenderness. There is no guarding or rebound.      Hernia: No hernia is present.   Musculoskeletal:         General: No tenderness. Normal range of motion.      Cervical back: Normal range of motion and neck supple. No rigidity or tenderness.      Right lower leg: No edema (Trace only).      Left lower leg: No edema (Trace only).   Lymphadenopathy:      Cervical: No cervical adenopathy.   Skin:     General: Skin is warm and dry.      Coloration: Skin is not jaundiced or pale.      Findings: No erythema or rash.   Neurological:      General: No focal deficit present.      Mental Status: She is alert and oriented to person, place, and time. Mental status is at baseline.      Cranial Nerves: No cranial  nerve deficit.      Gait: Gait abnormal.      Deep Tendon Reflexes: Reflexes are normal and symmetric.   Psychiatric:         Mood and Affect: Mood normal.         Behavior: Behavior normal.         Thought Content: Thought content normal.         Judgment: Judgment normal.       Assessment:       1. Rheumatoid arthritis, involving unspecified site, unspecified whether rheumatoid factor present    2. Lumbar radiculitis    3. Chronic allergic rhinitis    4. Mild persistent asthma with exacerbation    5. Pulmonary nodule    6. Venous insufficiency    7. Primary hypertension    8. Hyperlipidemia, unspecified hyperlipidemia type    9. Vitamin D deficiency    10. Prediabetes    11. Abnormal weight gain    12. DANE (obstructive sleep apnea)    13. Aortic atherosclerosis    14. Centrilobular emphysema    15. Interstitial pulmonary disease, unspecified    16. Morbid obesity with BMI of 40.0-44.9, adult          Plan:       1. Rheumatoid arthritis, involving unspecified site, unspecified whether rheumatoid factor present  Followed by Rheumatology taken off Plaquenil and placed on Enbrel.  Discussed autoimmune disease as a class of illnesses with different aspects  - Comprehensive Metabolic Panel; Future  - TSH; Future    2. Lumbar radiculitis  Relatively asymptomatic at present    3. Chronic allergic rhinitis  Mildly symptomatic at present has Flonase and Astelin to use p.r.n.    4. Mild persistent asthma with exacerbation  Well controlled with Singulair and Breo, ProAir as a rescue inhaler.  No longer taking Symbicort    5. Pulmonary nodule  Followed with regular CT chest by Pulmonary no change in nodules as of November 2022    6. Venous insufficiency  Mildly active, asymptomatic    7. Primary hypertension  Fairly well controlled no medication changes needed  - Comprehensive Metabolic Panel; Future  - Lipid Panel; Future  - CBC Auto Differential; Future    8. Hyperlipidemia, unspecified hyperlipidemia type  Await lipid panel  results, not currently on a statin  - Comprehensive Metabolic Panel; Future  - Lipid Panel; Future    9. Vitamin D deficiency  Unable to test for levels at this time due to shortage of testing materials    10. Prediabetes  Await A1c result  - Comprehensive Metabolic Panel; Future  - Hemoglobin A1C; Future    11. Abnormal weight gain  Check TSH  - TSH; Future    12. DANE (obstructive sleep apnea)  Stable    13. Aortic atherosclerosis  Stable and asymptomatic, monitor blood pressure, cholesterol levels, and keep an eye on A1c levels    14. Centrilobular emphysema  Stable no longer smoking    15. Interstitial pulmonary disease, unspecified  Asymptomatic no sign of heart failure per Cardiology, Dr. Benito    16. Morbid obesity with BMI of 40.0-44.9, adult  Stressed importance of weight loss with her arthritis of the knees, aquatic exercises would be helpful but she has a sensitivity to chlorine that would be a problem.  Recumbent bike, elliptical may be more tolerated  - Comprehensive Metabolic Panel; Future  - Hemoglobin A1C; Future  - TSH; Future  - Lipid Panel; Future

## 2023-03-10 ENCOUNTER — LAB VISIT (OUTPATIENT)
Dept: LAB | Facility: HOSPITAL | Age: 78
End: 2023-03-10
Attending: FAMILY MEDICINE
Payer: MEDICARE

## 2023-03-10 DIAGNOSIS — M06.9 RHEUMATOID ARTHRITIS, INVOLVING UNSPECIFIED SITE, UNSPECIFIED WHETHER RHEUMATOID FACTOR PRESENT: ICD-10-CM

## 2023-03-10 DIAGNOSIS — E78.5 HYPERLIPIDEMIA, UNSPECIFIED HYPERLIPIDEMIA TYPE: ICD-10-CM

## 2023-03-10 DIAGNOSIS — R73.03 PREDIABETES: ICD-10-CM

## 2023-03-10 DIAGNOSIS — E66.01 MORBID OBESITY WITH BMI OF 40.0-44.9, ADULT: ICD-10-CM

## 2023-03-10 DIAGNOSIS — I10 PRIMARY HYPERTENSION: ICD-10-CM

## 2023-03-10 DIAGNOSIS — R63.5 ABNORMAL WEIGHT GAIN: ICD-10-CM

## 2023-03-10 LAB
ALBUMIN SERPL BCP-MCNC: 3.7 G/DL (ref 3.5–5.2)
ALP SERPL-CCNC: 55 U/L (ref 55–135)
ALT SERPL W/O P-5'-P-CCNC: 13 U/L (ref 10–44)
ANION GAP SERPL CALC-SCNC: 8 MMOL/L (ref 8–16)
AST SERPL-CCNC: 15 U/L (ref 10–40)
BASOPHILS # BLD AUTO: 0.08 K/UL (ref 0–0.2)
BASOPHILS NFR BLD: 1 % (ref 0–1.9)
BILIRUB SERPL-MCNC: 0.8 MG/DL (ref 0.1–1)
BUN SERPL-MCNC: 17 MG/DL (ref 8–23)
CALCIUM SERPL-MCNC: 8.9 MG/DL (ref 8.7–10.5)
CHLORIDE SERPL-SCNC: 104 MMOL/L (ref 95–110)
CHOLEST SERPL-MCNC: 187 MG/DL (ref 120–199)
CHOLEST/HDLC SERPL: 4.6 {RATIO} (ref 2–5)
CO2 SERPL-SCNC: 25 MMOL/L (ref 23–29)
CREAT SERPL-MCNC: 0.9 MG/DL (ref 0.5–1.4)
DIFFERENTIAL METHOD: ABNORMAL
EOSINOPHIL # BLD AUTO: 0.3 K/UL (ref 0–0.5)
EOSINOPHIL NFR BLD: 4.4 % (ref 0–8)
ERYTHROCYTE [DISTWIDTH] IN BLOOD BY AUTOMATED COUNT: 12.8 % (ref 11.5–14.5)
EST. GFR  (NO RACE VARIABLE): >60 ML/MIN/1.73 M^2
ESTIMATED AVG GLUCOSE: 120 MG/DL (ref 68–131)
GLUCOSE SERPL-MCNC: 117 MG/DL (ref 70–110)
HBA1C MFR BLD: 5.8 % (ref 4–5.6)
HCT VFR BLD AUTO: 42.6 % (ref 37–48.5)
HDLC SERPL-MCNC: 41 MG/DL (ref 40–75)
HDLC SERPL: 21.9 % (ref 20–50)
HGB BLD-MCNC: 13.5 G/DL (ref 12–16)
IMM GRANULOCYTES # BLD AUTO: 0.02 K/UL (ref 0–0.04)
IMM GRANULOCYTES NFR BLD AUTO: 0.3 % (ref 0–0.5)
LDLC SERPL CALC-MCNC: 97 MG/DL (ref 63–159)
LYMPHOCYTES # BLD AUTO: 2.2 K/UL (ref 1–4.8)
LYMPHOCYTES NFR BLD: 27.7 % (ref 18–48)
MCH RBC QN AUTO: 30.9 PG (ref 27–31)
MCHC RBC AUTO-ENTMCNC: 31.7 G/DL (ref 32–36)
MCV RBC AUTO: 98 FL (ref 82–98)
MONOCYTES # BLD AUTO: 0.8 K/UL (ref 0.3–1)
MONOCYTES NFR BLD: 10.3 % (ref 4–15)
NEUTROPHILS # BLD AUTO: 4.4 K/UL (ref 1.8–7.7)
NEUTROPHILS NFR BLD: 56.3 % (ref 38–73)
NONHDLC SERPL-MCNC: 146 MG/DL
NRBC BLD-RTO: 0 /100 WBC
PLATELET # BLD AUTO: 206 K/UL (ref 150–450)
PMV BLD AUTO: 9.6 FL (ref 9.2–12.9)
POTASSIUM SERPL-SCNC: 4 MMOL/L (ref 3.5–5.1)
PROT SERPL-MCNC: 8.3 G/DL (ref 6–8.4)
RBC # BLD AUTO: 4.37 M/UL (ref 4–5.4)
SODIUM SERPL-SCNC: 137 MMOL/L (ref 136–145)
TRIGL SERPL-MCNC: 245 MG/DL (ref 30–150)
TSH SERPL DL<=0.005 MIU/L-ACNC: 1.73 UIU/ML (ref 0.4–4)
WBC # BLD AUTO: 7.76 K/UL (ref 3.9–12.7)

## 2023-03-10 PROCEDURE — 80061 LIPID PANEL: CPT | Performed by: FAMILY MEDICINE

## 2023-03-10 PROCEDURE — 36415 COLL VENOUS BLD VENIPUNCTURE: CPT | Performed by: FAMILY MEDICINE

## 2023-03-10 PROCEDURE — 84443 ASSAY THYROID STIM HORMONE: CPT | Performed by: FAMILY MEDICINE

## 2023-03-10 PROCEDURE — 83036 HEMOGLOBIN GLYCOSYLATED A1C: CPT | Performed by: FAMILY MEDICINE

## 2023-03-10 PROCEDURE — 80053 COMPREHEN METABOLIC PANEL: CPT | Performed by: FAMILY MEDICINE

## 2023-03-10 PROCEDURE — 85025 COMPLETE CBC W/AUTO DIFF WBC: CPT | Performed by: FAMILY MEDICINE

## 2023-05-15 ENCOUNTER — OFFICE VISIT (OUTPATIENT)
Dept: PULMONOLOGY | Facility: CLINIC | Age: 78
End: 2023-05-15
Payer: MEDICARE

## 2023-05-15 VITALS
WEIGHT: 223.19 LBS | DIASTOLIC BLOOD PRESSURE: 70 MMHG | OXYGEN SATURATION: 94 % | SYSTOLIC BLOOD PRESSURE: 124 MMHG | BODY MASS INDEX: 43.59 KG/M2 | HEART RATE: 65 BPM

## 2023-05-15 DIAGNOSIS — G47.33 OSA (OBSTRUCTIVE SLEEP APNEA): Primary | ICD-10-CM

## 2023-05-15 DIAGNOSIS — M06.9 RHEUMATOID ARTHRITIS, INVOLVING UNSPECIFIED SITE, UNSPECIFIED WHETHER RHEUMATOID FACTOR PRESENT: ICD-10-CM

## 2023-05-15 DIAGNOSIS — J96.11 CHRONIC HYPOXEMIC RESPIRATORY FAILURE: ICD-10-CM

## 2023-05-15 DIAGNOSIS — J45.30 MILD PERSISTENT ASTHMA, UNSPECIFIED WHETHER COMPLICATED: ICD-10-CM

## 2023-05-15 PROCEDURE — 99213 PR OFFICE/OUTPT VISIT, EST, LEVL III, 20-29 MIN: ICD-10-PCS | Mod: S$GLB,,, | Performed by: NURSE PRACTITIONER

## 2023-05-15 PROCEDURE — 99213 OFFICE O/P EST LOW 20 MIN: CPT | Mod: S$GLB,,, | Performed by: NURSE PRACTITIONER

## 2023-05-15 NOTE — PROGRESS NOTES
SUBJECTIVE:    Patient ID: Ling Rapp is a 77 y.o. female.    Chief Complaint: Follow-up and Asthma (6 month follow up Asthma)       Patient here today feeling well. She is using Breo daily. She does get short of breath with exertion at times. She is wearing her oxygen all of the time. She sleeps on her CPAP with oxygen bled in.   Her compliance report shows she is 100% compliant sleeps an average of 9 hours and 19 minutes with an AHI of 1.8. Her CT was stable. Her PFT was normal.      Past Medical History:   Diagnosis Date    ALLERGIC RHINITIS     Anticoagulant long-term use     Asthma     Cataract     Former smoker     GERD (gastroesophageal reflux disease)     Hemorrhoids     Hypertension     DANE (obstructive sleep apnea)     Osteopenia     Pancreatitis     secondary to severe hypertriglyceridemia    Peptic ulcer     Rheumatoid arthritis     Vitamin D deficiency      Past Surgical History:   Procedure Laterality Date    Athroscopy(knee)      BREAST BIOPSY Left 2017    benign    CARDIAC CATHETERIZATION      CATARACT EXTRACTION Left 2013    COLONOSCOPY N/A 3/30/2016    Procedure: COLONOSCOPY;  Surgeon: Thee Sorenson MD;  Location: [facility];  Service: Endoscopy;  Laterality: N/A;    EYE SURGERY      cataracts bilateral     FRACTURE SURGERY  1997    right knee     HYSTERECTOMY  1982    PARTIAL HYSTERECTOMY      w/ USO    TONSILLECTOMY      TRANSFORAMINAL EPIDURAL INJECTION OF STEROID Right 4/15/2019    Procedure: Injection,steroid,epidural,transforaminal approach;  Surgeon: Fernando Jenkins MD;  Location: [facility];  Service: Pain Management;  Laterality: Right;  L4-5, L5-S1    UPPER GASTROINTESTINAL ENDOSCOPY  5/2013     Family History   Problem Relation Age of Onset    Colon polyps Sister 64    Ulcers Father     Emphysema Father         smoker    Alcohol abuse Father     Thyroid disease Mother     Heart disease Mother     Emphysema Mother         smoker    Alzheimer's disease Brother     Stroke Brother      Cancer Brother     Throat cancer Brother         non smoker    No Known Problems Son     Diabetes Maternal Uncle         1/2 brother    Cancer Paternal Aunt     Early death Paternal Aunt     Leukemia Paternal Aunt         highschool    Breast cancer Paternal Aunt     Heart disease Paternal Uncle     Rheum arthritis Maternal Grandmother     Pneumonia Maternal Grandfather     Cancer Paternal Grandmother     Breast cancer Paternal Grandmother     Early death Paternal Grandfather          killed in line of duty        Social History:   Marital Status:   Occupation:retired    Alcohol History:  reports no history of alcohol use.  Tobacco History:  reports that she quit smoking about 51 years ago. Her smoking use included cigarettes. She has a 10.00 pack-year smoking history. She has never used smokeless tobacco.  Drug History:  reports no history of drug use.    Review of patient's allergies indicates:   Allergen Reactions    Amoxicillin-pot clavulanate Diarrhea and Other (See Comments)     Sever cramping    Diltiazem Other (See Comments)     Extreme dry moth    Fenofibrate Other (See Comments)     Other reaction(s): Itching    Hydroxychloroquine      Other reaction(s): eye accomodation problems    Leflunomide      Other reaction(s): abd pains    Lisinopril-hydrochlorothiazide      Other reaction(s): diarrhea  Other reaction(s): cramps    Methotrexate      Other reaction(s): pancreatitis    Norvasc [amlodipine] Swelling    Sulfa (sulfonamide antibiotics)      Other reaction(s): Swelling  Other reaction(s): Hives    Sulfamethoxazole-trimethoprim        Current Outpatient Medications   Medication Sig Dispense Refill    albuterol (PROAIR HFA) 90 mcg/actuation inhaler Inhale 2 puffs into the lungs every 6 (six) hours as needed for Wheezing. 3 Inhaler 5    artificial saliva, yerbas-lyt, SprA Use as directed 240 mL 11    aspirin 81 mg Tab Take 81 mg by mouth once daily. Every day      atenolol (TENORMIN)  100 MG tablet TAKE 1 TABLET DAILY 90 tablet 2    azelastine (ASTELIN) 137 mcg (0.1 %) nasal spray 1 spray by Nasal route 2 (two) times daily.      CALCIUM CARBONATE/VITAMIN D3 (OS-ADDY 500 + D) 500-125 mg-unit Tab Take 1 tablet by mouth once daily. Twice a day      cetirizine (ZYRTEC) 10 MG tablet Take 10 mg by mouth once daily. Every day      cholecalciferol, vitamin D3, 5,000 unit capsule Take 5,000 Units by mouth once daily. 5 a week      cyanocobalamin (VITAMIN B-12) 100 MCG tablet Take 100 mcg by mouth once daily.      diclofenac sodium (VOLTAREN) 1 % Gel Apply 2 g topically once daily.      entanercept (ENBREL) 50 mg/mL injection 50 mg once a week. once weekly      fluticasone (FLONASE) 50 mcg/actuation nasal spray 2 sprays by Each Nare route once daily. Every day 48 g 3    fluticasone furoate-vilanteroL (BREO) 100-25 mcg/dose diskus inhaler Inhale 1 puff into the lungs.      magnesium oxide (MAG-OX) 400 mg tablet Take 400 mg by mouth once daily.      montelukast (SINGULAIR) 10 mg tablet TAKE 1 TABLET BY MOUTH EVERY DAY IN THE EVENING 90 tablet 3    olmesartan (BENICAR) 40 MG tablet Take 40 mg by mouth once daily.      omeprazole (PRILOSEC) 40 MG capsule TAKE 1 CAPSULE TWICE DAILY BEFORE MEALS 180 capsule 2    spironolactone (ALDACTONE) 50 MG tablet       UBIDECARENONE (COQ-10 ORAL) Take 1 capsule by mouth once daily.       benzonatate (TESSALON) 200 MG capsule Take 200 mg by mouth 3 (three) times daily as needed for Cough.      budesonide-formoterol 160-4.5 mcg (SYMBICORT) 160-4.5 mcg/actuation HFAA Inhale 2 puffs into the lungs every 12 (twelve) hours. (Patient not taking: Reported on 3/8/2023) 10.2 g 6    triamcinolone acetonide 0.1% (KENALOG) 0.1 % cream SMARTSIG:Sparingly Topical Twice Daily PRN       No current facility-administered medications for this visit.       Last PFT: 11/2022 normal    Last Ct of chest 11/2022    IMPRESSION:  Stable cardiomegaly with mild increase interstitial markings  angiographic groundglass opacities     Stable fissural nodule in the right upper lobe     General:feeling well   Eyes: Vision is good.  ENT:  Post nasal drip  Heart:: No chest pain or palpitations.  Lungs: not coughing, dyspnea with exertion  GI: reflux controlled with Prilosec  : No dysuria, hesitancy, or nocturia.  Musculoskeletal:joint pain ok at present time,   Skin: No lesions or rashes.  Neuro: No headaches or neuropathy.  Lymph: swelling to legs better   Psych: No anxiety or depression.  Endo: weight stable      OBJECTIVE:      /70 (BP Location: Right arm, Patient Position: Sitting, BP Method: Large (Manual))   Pulse 65   Wt 101.2 kg (223 lb 3.2 oz)   SpO2 (!) 94% Comment: On 2 liters of Oxygen  BMI 43.59 kg/m²     Physical Exam  GENERAL: Older patient in no distress.  HEENT: Pupils equal and reactive. Extraocular movements intact. Nose intact.      Pharynx moist.  NECK: Supple.   HEART: Regular rate and rhythm. No murmur or gallop auscultated.  LUNGS:  Crackles to bases   ABDOMEN: obese, Bowel sounds present. Non-tender, no masses palpated.  EXTREMITIES: Normal muscle tone and joint movement, clubbed nails  LYMPHATICS: No adenopathy palpated, 1+ edema.  SKIN: Dry, intact, no lesions.   NEURO: Cranial nerves II-XII intact. Motor strength 5/5 bilaterally, upper and lower extremities.  PSYCH: Appropriate affect.    Assessment:       1. DANE (obstructive sleep apnea)    2. Chronic hypoxemic respiratory failure    3. Mild persistent asthma, unspecified whether complicated              Plan:             Continue the Breo   Continue the oxygen  Keep sleeping on your CPAP with oxygen bled in

## 2023-05-16 DIAGNOSIS — J45.909 ASTHMA, UNSPECIFIED ASTHMA SEVERITY, UNSPECIFIED WHETHER COMPLICATED, UNSPECIFIED WHETHER PERSISTENT: ICD-10-CM

## 2023-05-16 RX ORDER — MONTELUKAST SODIUM 10 MG/1
TABLET ORAL
Qty: 90 TABLET | Refills: 3 | Status: SHIPPED | OUTPATIENT
Start: 2023-05-16

## 2023-10-09 ENCOUNTER — PATIENT MESSAGE (OUTPATIENT)
Dept: ADMINISTRATIVE | Facility: HOSPITAL | Age: 78
End: 2023-10-09
Payer: MEDICARE

## 2023-11-13 ENCOUNTER — OFFICE VISIT (OUTPATIENT)
Dept: PULMONOLOGY | Facility: CLINIC | Age: 78
End: 2023-11-13
Payer: MEDICARE

## 2023-11-13 VITALS
DIASTOLIC BLOOD PRESSURE: 85 MMHG | HEIGHT: 60 IN | OXYGEN SATURATION: 96 % | WEIGHT: 220 LBS | HEART RATE: 64 BPM | BODY MASS INDEX: 43.19 KG/M2 | SYSTOLIC BLOOD PRESSURE: 160 MMHG

## 2023-11-13 DIAGNOSIS — G47.33 OSA (OBSTRUCTIVE SLEEP APNEA): Primary | ICD-10-CM

## 2023-11-13 DIAGNOSIS — J45.30 MILD PERSISTENT ASTHMA, UNSPECIFIED WHETHER COMPLICATED: ICD-10-CM

## 2023-11-13 DIAGNOSIS — J96.11 CHRONIC HYPOXEMIC RESPIRATORY FAILURE: ICD-10-CM

## 2023-11-13 DIAGNOSIS — M06.9 RHEUMATOID ARTHRITIS, INVOLVING UNSPECIFIED SITE, UNSPECIFIED WHETHER RHEUMATOID FACTOR PRESENT: ICD-10-CM

## 2023-11-13 PROCEDURE — 99213 OFFICE O/P EST LOW 20 MIN: CPT | Mod: S$GLB,,, | Performed by: NURSE PRACTITIONER

## 2023-11-13 PROCEDURE — 99213 PR OFFICE/OUTPT VISIT, EST, LEVL III, 20-29 MIN: ICD-10-PCS | Mod: S$GLB,,, | Performed by: NURSE PRACTITIONER

## 2023-11-13 NOTE — PROGRESS NOTES
SUBJECTIVE:    Patient ID: Ling Rapp is a 78 y.o. female.    Chief Complaint: Apnea       Patient here today feeling well. She is using Breo daily. She does get short of breath with exertion at times. She is wearing her oxygen all of the time. She sleeps on her CPAP with oxygen bled in.   Her compliance report shows she is 100% compliant sleeps an average of 9 hours and 4 minutes with an AHI of 1.6.      Past Medical History:   Diagnosis Date    ALLERGIC RHINITIS     Anticoagulant long-term use     Asthma     Cataract     Former smoker     GERD (gastroesophageal reflux disease)     Hemorrhoids     Hypertension     DANE (obstructive sleep apnea)     Osteopenia     Pancreatitis     secondary to severe hypertriglyceridemia    Peptic ulcer     Rheumatoid arthritis     Vitamin D deficiency      Past Surgical History:   Procedure Laterality Date    Athroscopy(knee)      BREAST BIOPSY Left 2017    benign    CARDIAC CATHETERIZATION      CATARACT EXTRACTION Left 2013    COLONOSCOPY N/A 3/30/2016    Procedure: COLONOSCOPY;  Surgeon: Thee Sorenson MD;  Location: [facility];  Service: Endoscopy;  Laterality: N/A;    EYE SURGERY      cataracts bilateral     FRACTURE SURGERY  1997    right knee     HYSTERECTOMY  1982    PARTIAL HYSTERECTOMY      w/ USO    TONSILLECTOMY      TRANSFORAMINAL EPIDURAL INJECTION OF STEROID Right 4/15/2019    Procedure: Injection,steroid,epidural,transforaminal approach;  Surgeon: Fernando Jenkins MD;  Location: [facility] OR;  Service: Pain Management;  Laterality: Right;  L4-5, L5-S1    UPPER GASTROINTESTINAL ENDOSCOPY  5/2013     Family History   Problem Relation Age of Onset    Colon polyps Sister 64    Ulcers Father     Emphysema Father         smoker    Alcohol abuse Father     Thyroid disease Mother     Heart disease Mother     Emphysema Mother         smoker    Alzheimer's disease Brother     Stroke Brother     Cancer Brother     Throat cancer Brother         non smoker    No Known  Problems Son     Diabetes Maternal Uncle         1/2 brother    Cancer Paternal Aunt     Early death Paternal Aunt     Leukemia Paternal Aunt         highschool    Breast cancer Paternal Aunt     Heart disease Paternal Uncle     Rheum arthritis Maternal Grandmother     Pneumonia Maternal Grandfather     Cancer Paternal Grandmother     Breast cancer Paternal Grandmother     Early death Paternal Grandfather          killed in line of duty        Social History:   Marital Status:   Occupation:retired    Alcohol History:  reports no history of alcohol use.  Tobacco History:  reports that she quit smoking about 51 years ago. Her smoking use included cigarettes. She started smoking about 61 years ago. She has a 10.0 pack-year smoking history. She has never used smokeless tobacco.  Drug History:  reports no history of drug use.    Review of patient's allergies indicates:   Allergen Reactions    Amoxicillin-pot clavulanate Diarrhea and Other (See Comments)     Sever cramping    Diltiazem Other (See Comments)     Extreme dry moth    Fenofibrate Other (See Comments)     Other reaction(s): Itching    Hydroxychloroquine      Other reaction(s): eye accomodation problems    Leflunomide      Other reaction(s): abd pains    Lisinopril-hydrochlorothiazide      Other reaction(s): diarrhea  Other reaction(s): cramps    Methotrexate      Other reaction(s): pancreatitis    Norvasc [amlodipine] Swelling    Sulfa (sulfonamide antibiotics)      Other reaction(s): Swelling  Other reaction(s): Hives    Sulfamethoxazole-trimethoprim        Current Outpatient Medications   Medication Sig Dispense Refill    albuterol (PROAIR HFA) 90 mcg/actuation inhaler Inhale 2 puffs into the lungs every 6 (six) hours as needed for Wheezing. 3 Inhaler 5    artificial saliva, yerbas-lyt, SprA Use as directed 240 mL 11    aspirin 81 mg Tab Take 81 mg by mouth once daily. Every day      atenolol (TENORMIN) 100 MG tablet TAKE 1 TABLET DAILY 90  tablet 2    azelastine (ASTELIN) 137 mcg (0.1 %) nasal spray 1 spray by Nasal route 2 (two) times daily.      benzonatate (TESSALON) 200 MG capsule Take 200 mg by mouth 3 (three) times daily as needed for Cough.      CALCIUM CARBONATE/VITAMIN D3 (OS-ADDY 500 + D) 500-125 mg-unit Tab Take 1 tablet by mouth once daily. Twice a day      cetirizine (ZYRTEC) 10 MG tablet Take 10 mg by mouth once daily. Every day      cholecalciferol, vitamin D3, 5,000 unit capsule Take 5,000 Units by mouth once daily. 5 a week      cyanocobalamin (VITAMIN B-12) 100 MCG tablet Take 100 mcg by mouth once daily.      diclofenac sodium (VOLTAREN) 1 % Gel Apply 2 g topically once daily.      entanercept (ENBREL) 50 mg/mL injection 50 mg once a week. once weekly      fluticasone (FLONASE) 50 mcg/actuation nasal spray 2 sprays by Each Nare route once daily. Every day 48 g 3    fluticasone furoate-vilanteroL (BREO) 100-25 mcg/dose diskus inhaler Inhale 1 puff into the lungs.      magnesium oxide (MAG-OX) 400 mg tablet Take 400 mg by mouth once daily.      montelukast (SINGULAIR) 10 mg tablet TAKE 1 TABLET BY MOUTH EVERY DAY IN THE EVENING 90 tablet 3    olmesartan (BENICAR) 40 MG tablet Take 40 mg by mouth once daily.      omeprazole (PRILOSEC) 40 MG capsule TAKE 1 CAPSULE TWICE DAILY BEFORE MEALS 180 capsule 2    spironolactone (ALDACTONE) 50 MG tablet       triamcinolone acetonide 0.1% (KENALOG) 0.1 % cream SMARTSIG:Sparingly Topical Twice Daily PRN      UBIDECARENONE (COQ-10 ORAL) Take 1 capsule by mouth once daily.       budesonide-formoterol 160-4.5 mcg (SYMBICORT) 160-4.5 mcg/actuation HFAA Inhale 2 puffs into the lungs every 12 (twelve) hours. (Patient not taking: Reported on 3/8/2023) 10.2 g 6     No current facility-administered medications for this visit.       Last PFT: 11/2022 normal    Last Ct of chest 11/2022    IMPRESSION:  Stable cardiomegaly with mild increase interstitial markings angiographic groundglass opacities     Stable  fissural nodule in the right upper lobe     General:feeling well   Eyes: Vision is good.  ENT:  Post nasal drip  Heart:: No chest pain or palpitations.  Lungs: not coughing, dyspnea with exertion  GI: reflux controlled with Prilosec  : No dysuria, hesitancy, or nocturia.  Musculoskeletal:joint pain ok at present time,   Skin: No lesions or rashes.  Neuro: No headaches or neuropathy.  Lymph: swelling to legs better   Psych: No anxiety or depression.  Endo: weight stable      OBJECTIVE:      BP (!) 160/85 (BP Location: Left arm, Patient Position: Sitting, BP Method: Large (Manual))   Pulse 64   Ht 5' (1.524 m)   Wt 99.8 kg (220 lb)   SpO2 96% Comment: 2LPM PD  BMI 42.97 kg/m²     Physical Exam  GENERAL: Older patient in no distress.  HEENT: Pupils equal and reactive. Extraocular movements intact. Nose intact.      Pharynx moist.  NECK: Supple.   HEART: Regular rate and rhythm. No murmur or gallop auscultated.  LUNGS:  Crackles to bases   ABDOMEN: obese, Bowel sounds present. Non-tender, no masses palpated.  EXTREMITIES: Normal muscle tone and joint movement, clubbed nails  LYMPHATICS: No adenopathy palpated, 1+ edema.  SKIN: Dry, intact, no lesions.   NEURO: Cranial nerves II-XII intact. Motor strength 5/5 bilaterally, upper and lower extremities.  PSYCH: Appropriate affect.    Assessment:       1. DANE (obstructive sleep apnea)    2. Chronic hypoxemic respiratory failure    3. Mild persistent asthma, unspecified whether complicated    4. Rheumatoid arthritis, involving unspecified site, unspecified whether rheumatoid factor present                Plan:             Continue the Breo   Continue the oxygen  Keep sleeping on your CPAP with oxygen bled in  CT chest   PFT

## 2023-11-13 NOTE — PATIENT INSTRUCTIONS
Continue the Breo   Continue the oxygen  Keep sleeping on your CPAP with oxygen bled in  CT chest   PFT

## 2023-12-20 ENCOUNTER — HOSPITAL ENCOUNTER (OUTPATIENT)
Dept: PULMONOLOGY | Facility: HOSPITAL | Age: 78
Discharge: HOME | End: 2023-12-20
Attending: NURSE PRACTITIONER
Payer: MEDICARE

## 2023-12-20 ENCOUNTER — HOSPITAL ENCOUNTER (OUTPATIENT)
Dept: RADIOLOGY | Facility: HOSPITAL | Age: 78
Discharge: HOME | End: 2023-12-20
Attending: NURSE PRACTITIONER
Payer: MEDICARE

## 2023-12-20 DIAGNOSIS — J96.11 CHRONIC HYPOXEMIC RESPIRATORY FAILURE: ICD-10-CM

## 2023-12-20 DIAGNOSIS — M06.9 RHEUMATOID ARTHRITIS, INVOLVING UNSPECIFIED SITE, UNSPECIFIED WHETHER RHEUMATOID FACTOR PRESENT: ICD-10-CM

## 2023-12-20 DIAGNOSIS — J45.30 MILD PERSISTENT ASTHMA, UNSPECIFIED WHETHER COMPLICATED: ICD-10-CM

## 2023-12-20 PROCEDURE — 94727 GAS DIL/WSHOT DETER LNG VOL: CPT

## 2023-12-20 PROCEDURE — 71250 CT THORAX DX C-: CPT | Mod: TC

## 2023-12-20 PROCEDURE — 94729 DIFFUSING CAPACITY: CPT

## 2023-12-20 PROCEDURE — 94010 BREATHING CAPACITY TEST: CPT

## 2023-12-21 ENCOUNTER — TELEPHONE (OUTPATIENT)
Dept: PULMONOLOGY | Facility: CLINIC | Age: 78
End: 2023-12-21

## 2023-12-21 NOTE — TELEPHONE ENCOUNTER
PFT shows no obstruction, no restriction, mild diffusion defect.  PFT a little worse overall compared to previous

## 2023-12-21 NOTE — TELEPHONE ENCOUNTER
Ct chest  MPRESSION:  1. No acute cardiac or pulmonary process.  2. Unchanged pulmonary nodules as outlined above, with no new concerning pulmonary nodule/mass identified.  3. Findings of very mild UIP pattern of ILD.  4. Cardiomegaly with coronary artery calcifications most pronounced in the LAD and RCA distribution.

## 2023-12-28 NOTE — TELEPHONE ENCOUNTER
Spoke with the patient. She does not feel that her breathing is worse.  She has always crackled. She has been on Enbrel for years for her RA, has been having more joint pain in fingers and knees.  Will make sure to forward CT report and PFT to her rheumatologist.   Will also see if radiologist can compare this CT as far as the ILD to previous CT scans to assess for progression of this.  Mentioned OFEV but will hold off for now.

## 2024-01-08 ENCOUNTER — TELEPHONE (OUTPATIENT)
Dept: PULMONOLOGY | Facility: CLINIC | Age: 79
End: 2024-01-08

## 2024-01-08 ENCOUNTER — PATIENT MESSAGE (OUTPATIENT)
Dept: ADMINISTRATIVE | Facility: HOSPITAL | Age: 79
End: 2024-01-08
Payer: MEDICARE

## 2024-01-09 ENCOUNTER — PATIENT MESSAGE (OUTPATIENT)
Dept: ADMINISTRATIVE | Facility: HOSPITAL | Age: 79
End: 2024-01-09
Payer: MEDICARE

## 2024-01-09 ENCOUNTER — PATIENT OUTREACH (OUTPATIENT)
Dept: ADMINISTRATIVE | Facility: HOSPITAL | Age: 79
End: 2024-01-09
Payer: MEDICARE

## 2024-01-09 DIAGNOSIS — Z78.0 POST-MENOPAUSAL: Primary | ICD-10-CM

## 2024-01-09 NOTE — PROGRESS NOTES
Population Health Chart Review & Patient Outreach Details    Outreach Performed: YES Portal    Additional [facility] Health Notes:    CAMPAIGN- Preventative Care Screening       Updates Requested / Reviewed:      Updated Care Coordination Note         Health Maintenance Topics Overdue:    Health Maintenance Due   Topic Date Due    Shingles Vaccine (1 of 2) Never done    RSV Vaccine (Age 60+ and Pregnant patients) (1 - 1-dose 60+ series) Never done    Colonoscopy  03/30/2021    TETANUS VACCINE  04/14/2021    COVID-19 Vaccine (4 - 2023-24 season) 09/01/2023    DEXA Scan  12/11/2023         Health Maintenance Topic(s) Outreach Outcomes & Actions Taken:    Osteoporosis Screening - Outreach Outcomes & Actions Taken  : Dexa Order Placed and Dexa Appointment Scheduled

## 2024-01-22 ENCOUNTER — HOSPITAL ENCOUNTER (OUTPATIENT)
Dept: RADIOLOGY | Facility: HOSPITAL | Age: 79
Discharge: HOME | End: 2024-01-22
Attending: FAMILY MEDICINE
Payer: MEDICARE

## 2024-01-22 ENCOUNTER — TELEPHONE (OUTPATIENT)
Dept: FAMILY MEDICINE | Facility: CLINIC | Age: 79
End: 2024-01-22
Payer: MEDICARE

## 2024-01-22 DIAGNOSIS — Z12.31 SCREENING MAMMOGRAM FOR BREAST CANCER: Primary | ICD-10-CM

## 2024-01-22 DIAGNOSIS — Z78.0 POST-MENOPAUSAL: ICD-10-CM

## 2024-01-22 PROCEDURE — 77080 DXA BONE DENSITY AXIAL: CPT | Mod: TC

## 2024-01-22 PROCEDURE — 77080 DXA BONE DENSITY AXIAL: CPT | Mod: 26,,, | Performed by: RADIOLOGY

## 2024-01-22 NOTE — TELEPHONE ENCOUNTER
----- Message from Leda Kong sent at 1/22/2024 11:30 AM CST -----  Contact: self  Patient is due for her annual mammo on 2/3/24 and needs orders put in for it.  Call back after to advise so she can cassi it at [phone] and thanks

## 2024-03-08 ENCOUNTER — OFFICE VISIT (OUTPATIENT)
Dept: FAMILY MEDICINE | Facility: CLINIC | Age: 79
End: 2024-03-08
Attending: FAMILY MEDICINE
Payer: MEDICARE

## 2024-03-08 VITALS
TEMPERATURE: 98 F | DIASTOLIC BLOOD PRESSURE: 76 MMHG | OXYGEN SATURATION: 91 % | HEART RATE: 54 BPM | WEIGHT: 218.56 LBS | SYSTOLIC BLOOD PRESSURE: 126 MMHG | HEIGHT: 60 IN | BODY MASS INDEX: 42.91 KG/M2

## 2024-03-08 DIAGNOSIS — G47.33 OSA (OBSTRUCTIVE SLEEP APNEA): ICD-10-CM

## 2024-03-08 DIAGNOSIS — J84.9 INTERSTITIAL PULMONARY DISEASE, UNSPECIFIED: ICD-10-CM

## 2024-03-08 DIAGNOSIS — J96.11 CHRONIC HYPOXEMIC RESPIRATORY FAILURE: ICD-10-CM

## 2024-03-08 DIAGNOSIS — E66.01 MORBID OBESITY WITH BMI OF 40.0-44.9, ADULT: ICD-10-CM

## 2024-03-08 DIAGNOSIS — E78.5 HYPERLIPIDEMIA, UNSPECIFIED HYPERLIPIDEMIA TYPE: ICD-10-CM

## 2024-03-08 DIAGNOSIS — I87.2 VENOUS INSUFFICIENCY: ICD-10-CM

## 2024-03-08 DIAGNOSIS — J43.2 CENTRILOBULAR EMPHYSEMA: ICD-10-CM

## 2024-03-08 DIAGNOSIS — J45.31 MILD PERSISTENT ASTHMA WITH EXACERBATION: ICD-10-CM

## 2024-03-08 DIAGNOSIS — M06.9 RHEUMATOID ARTHRITIS, INVOLVING UNSPECIFIED SITE, UNSPECIFIED WHETHER RHEUMATOID FACTOR PRESENT: ICD-10-CM

## 2024-03-08 DIAGNOSIS — R73.9 HYPERGLYCEMIA: ICD-10-CM

## 2024-03-08 DIAGNOSIS — I10 PRIMARY HYPERTENSION: ICD-10-CM

## 2024-03-08 DIAGNOSIS — Z00.00 ENCOUNTER FOR PREVENTIVE HEALTH EXAMINATION: Primary | ICD-10-CM

## 2024-03-08 DIAGNOSIS — I70.0 AORTIC ATHEROSCLEROSIS: ICD-10-CM

## 2024-03-08 PROCEDURE — 99215 OFFICE O/P EST HI 40 MIN: CPT | Mod: PBBFAC,PN | Performed by: FAMILY MEDICINE

## 2024-03-08 PROCEDURE — 99999 PR PBB SHADOW E&M-EST. PATIENT-LVL V: CPT | Mod: PBBFAC,,, | Performed by: FAMILY MEDICINE

## 2024-03-08 PROCEDURE — 99214 OFFICE O/P EST MOD 30 MIN: CPT | Mod: S$PBB,,, | Performed by: FAMILY MEDICINE

## 2024-03-08 RX ORDER — ZINC GLUCONATE 50 MG
50 TABLET ORAL DAILY
COMMUNITY

## 2024-03-08 RX ORDER — CALCIUM CARBONATE 500(1250)
1 TABLET ORAL DAILY
COMMUNITY

## 2024-03-08 NOTE — PROGRESS NOTES
Subjective:       Patient ID: Ling Rapp is a 78 y.o. female.    Chief Complaint: Annual Exam    78-year-old female coming in for annual exam.  She is in the process seeking a divorce.  Giving reasons that her  is very controlling and has refused to make any changes when they have been to marital counseling in the past and recently tried to make her sign papers giving him control of her assets in the marriage.  At that point she filed for divorce.  She is awake alert an oriented and fully cognizant giving no evidence of impaired mental abilities.  She expresses some sadness over the situation but denies depression and has no historical information that would suggest dementia.  She has a history lumbar radiculopathy with previous epidural steroid injections, chronic allergic rhinitis using Astelin, Flonase, Singulair and saline spray with occasional use of Zyrtec and Mucinex.  She has mild persistent asthma without complications, centrilobar emphysema and interstitial lung disease.  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  She is being followed by Pulmonary.  I briefly reviewed her previous imaging and she had no pleural plaques present but there was evidence of some early interstitial lung disease.  She has hypertension, hyperlipidemia on no statins, venous insufficiency with some mild dependent edema, aortic atherosclerosis, vitamin-D deficiency, prediabetes, sleep apnea and moderate obesity.  Her last colonoscopy was March of 2016.  Flu and pneumonia vaccines are up-to-date but she has not yet had the shingles vaccine.    Past Medical History:  No date: ALLERGIC RHINITIS  No date: Anticoagulant long-term use  No date: Asthma  No date: Cataract  No date: Former smoker  No date: GERD (gastroesophageal reflux disease)  No date: Hemorrhoids  No date:  Hypertension  No date: DANE (obstructive sleep apnea)  No date: Osteopenia  No date: Pancreatitis      Comment:  secondary to severe hypertriglyceridemia  No date: Peptic ulcer  No date: Rheumatoid arthritis  No date: Vitamin D deficiency    Past Surgical History:  No date: Athroscopy(knee)  2017: BREAST BIOPSY; Left      Comment:  benign  No date: CARDIAC CATHETERIZATION  2013: CATARACT EXTRACTION; Left  3/30/2016: COLONOSCOPY; N/A      Comment:  Procedure: COLONOSCOPY;  Surgeon: Thee Sorenson MD;  Location: [facility];  Service: Endoscopy;                 Laterality: N/A;  No date: EYE SURGERY      Comment:  cataracts bilateral   1997: FRACTURE SURGERY      Comment:  right knee   1982: HYSTERECTOMY  No date: PARTIAL HYSTERECTOMY      Comment:  w/ USO  No date: TONSILLECTOMY  4/15/2019: TRANSFORAMINAL EPIDURAL INJECTION OF STEROID; Right      Comment:  Procedure: Injection,steroid,epidural,transforaminal                approach;  Surgeon: Fernando Jenkins MD;  Location: [facility] to [facility];                 Service: Pain Management;  Laterality: Right;  L4-5,                L5-S1  5/2013: UPPER GASTROINTESTINAL ENDOSCOPY    Review of patient's family history indicates:  Problem: Colon polyps      Relation: Sister          Age of Onset: 64  Problem: Ulcers      Relation: Father          Age of Onset: (Not Specified)  Problem: Emphysema      Relation: Father          Age of Onset: (Not Specified)          Comment: smoker  Problem: Alcohol abuse      Relation: Father          Age of Onset: (Not Specified)  Problem: Thyroid disease      Relation: Mother          Age of Onset: (Not Specified)  Problem: Heart disease      Relation: Mother          Age of Onset: (Not Specified)  Problem: Emphysema      Relation: Mother          Age of Onset: (Not Specified)          Comment: smoker  Problem: Alzheimer's disease      Relation: Brother          Age of Onset: (Not Specified)  Problem: Stroke      Relation: Brother           Age of Onset: (Not Specified)  Problem: Cancer      Relation: Brother          Age of Onset: (Not Specified)  Problem: Throat cancer      Relation: Brother          Age of Onset: (Not Specified)          Comment: non smoker  Problem: No Known Problems      Relation: Son          Age of Onset: (Not Specified)  Problem: Diabetes      Relation: Maternal Uncle          Age of Onset: (Not Specified)          Comment: 1 brother  Problem: Cancer      Relation: Paternal Aunt          Age of Onset: (Not Specified)  Problem: Early death      Relation: Paternal Aunt          Age of Onset: (Not Specified)  Problem: Leukemia      Relation: Paternal Aunt          Age of Onset: (Not Specified)          Comment: highschool  Problem: Breast cancer      Relation: Paternal Aunt          Age of Onset: (Not Specified)  Problem: Heart disease      Relation: Paternal Uncle          Age of Onset: (Not Specified)  Problem: Rheum arthritis      Relation: Maternal Grandmother          Age of Onset: (Not Specified)  Problem: Pneumonia      Relation: Maternal Grandfather          Age of Onset: (Not Specified)  Problem: Cancer      Relation: Paternal Grandmother          Age of Onset: (Not Specified)  Problem: Breast cancer      Relation: Paternal Grandmother          Age of Onset: (Not Specified)  Problem: Early death      Relation: Paternal Grandfather          Age of Onset: (Not Specified)          Comment:  killed in line of duty    Social History    Tobacco Use      Smoking status: Former        Packs/day: 0.00        Years: 1 pack/day for 10.0 years (10.0 ttl pk-yrs)        Types: Cigarettes        Start date: 1962        Quit date: 1972        Years since quittin.2      Smokeless tobacco: Never      Tobacco comments: quit     Alcohol use: No    Drug use: No    Current Outpatient Medications on File Prior to Visit:  albuterol (PROAIR HFA) 90 mcg/actuation inhaler, Inhale 2 puffs into the lungs every 6 (six) hours as  needed for Wheezing., Disp: 3 Inhaler, Rfl: 5  artificial saliva, yerbas-lyt, SprA, Use as directed, Disp: 240 mL, Rfl: 11  aspirin 81 mg Tab, Take 81 mg by mouth once daily. Every day, Disp: , Rfl:   atenolol (TENORMIN) 100 MG tablet, TAKE 1 TABLET DAILY, Disp: 90 tablet, Rfl: 2  azelastine (ASTELIN) 137 mcg (0.1 %) nasal spray, 1 spray by Nasal route 2 (two) times daily., Disp: , Rfl:   benzonatate (TESSALON) 200 MG capsule, Take 200 mg by mouth 3 (three) times daily as needed for Cough., Disp: , Rfl:   budesonide-formoterol 160-4.5 mcg (SYMBICORT) 160-4.5 mcg/actuation HFAA, Inhale 2 puffs into the lungs every 12 (twelve) hours., Disp: 10.2 g, Rfl: 6  CALCIUM CARBONATE/VITAMIN D3 (OS-ADDY 500 + D) 500-125 mg-unit Tab, Take 1 tablet by mouth once daily. Twice a day, Disp: , Rfl:   cetirizine (ZYRTEC) 10 MG tablet, Take 10 mg by mouth once daily. Every day, Disp: , Rfl:   cholecalciferol, vitamin D3, 5,000 unit capsule, Take 5,000 Units by mouth once daily. 5 a week, Disp: , Rfl:   cyanocobalamin (VITAMIN B-12) 100 MCG tablet, Take 100 mcg by mouth once daily., Disp: , Rfl:   diclofenac sodium (VOLTAREN) 1 % Gel, Apply 2 g topically once daily., Disp: , Rfl:   entanercept (ENBREL) 50 mg/mL injection, 50 mg once a week. once weekly, Disp: , Rfl:   fluticasone (FLONASE) 50 mcg/actuation nasal spray, 2 sprays by Each Nare route once daily. Every day, Disp: 48 g, Rfl: 3  fluticasone furoate-vilanteroL (BREO) 100-25 mcg/dose diskus inhaler, Inhale 1 puff into the lungs., Disp: , Rfl:   magnesium oxide (MAG-OX) 400 mg tablet, Take 400 mg by mouth once daily., Disp: , Rfl:   montelukast (SINGULAIR) 10 mg tablet, TAKE 1 TABLET BY MOUTH EVERY DAY IN THE EVENING, Disp: 90 tablet, Rfl: 3  olmesartan (BENICAR) 40 MG tablet, Take 40 mg by mouth once daily., Disp: , Rfl:   omeprazole (PRILOSEC) 40 MG capsule, TAKE 1 CAPSULE TWICE DAILY BEFORE MEALS, Disp: 180 capsule, Rfl: 2  spironolactone (ALDACTONE) 50 MG tablet, , Disp: ,  Rfl:   triamcinolone acetonide 0.1% (KENALOG) 0.1 % cream, SMARTSIG:Sparingly Topical Twice Daily PRN, Disp: , Rfl:   UBIDECARENONE (COQ-10 ORAL), Take 1 capsule by mouth once daily. , Disp: , Rfl:   calcium carbonate (OS-ADDY) 500 mg calcium (1,250 mg) tablet, Take 1 tablet by mouth once daily., Disp: , Rfl:   zinc gluconate 50 mg tablet, Take 50 mg by mouth once daily., Disp: , Rfl:     No current facility-administered medications on file prior to visit.          Review of Systems   Constitutional:  Negative for chills, diaphoresis, fatigue, fever and unexpected weight change.   HENT:  Positive for congestion. Negative for ear pain, hearing loss, postnasal drip, sinus pressure and sneezing.    Eyes:  Negative for itching and visual disturbance.   Respiratory:  Negative for cough, chest tightness, shortness of breath and wheezing.    Cardiovascular:  Negative for chest pain, palpitations and leg swelling.   Gastrointestinal:  Negative for abdominal pain, blood in stool, constipation, diarrhea, nausea and vomiting.   Genitourinary:  Negative for dysuria, frequency and hematuria.   Musculoskeletal:  Negative for arthralgias, back pain, joint swelling and myalgias.   Neurological:  Negative for dizziness and headaches.   Hematological:  Negative for adenopathy.   Psychiatric/Behavioral:  Negative for sleep disturbance. The patient is not nervous/anxious.        Objective:      Physical Exam  Vitals and nursing note reviewed.   Constitutional:       General: She is not in acute distress.     Appearance: Normal appearance. She is well-developed. She is obese. She is not ill-appearing, toxic-appearing or diaphoretic.      Comments: Good blood pressure control   Mild bradycardia on atenolol with regular rhythm   Morbid obesity with a BMI of 42.7 she is down 6.3 lb from her last physical March 8, 2023   HENT:      Head: Normocephalic and atraumatic.      Right Ear: Tympanic membrane, ear canal and external ear normal. There  is no impacted cerumen.      Left Ear: Tympanic membrane, ear canal and external ear normal. There is no impacted cerumen.      Nose: Nose normal. No congestion or rhinorrhea.      Mouth/Throat:      Mouth: Mucous membranes are moist.      Pharynx: Oropharynx is clear. No oropharyngeal exudate or posterior oropharyngeal erythema.   Eyes:      General: No scleral icterus.        Right eye: No discharge.         Left eye: No discharge.      Extraocular Movements: Extraocular movements intact.      Conjunctiva/sclera: Conjunctivae normal.      Pupils: Pupils are equal, round, and reactive to light.   Neck:      Thyroid: No thyromegaly.      Vascular: No carotid bruit or JVD.   Cardiovascular:      Rate and Rhythm: Normal rate and regular rhythm.      Pulses: Normal pulses.      Heart sounds: Normal heart sounds. No murmur heard.     No friction rub. No gallop.   Pulmonary:      Effort: Pulmonary effort is normal. No respiratory distress.      Breath sounds: Normal breath sounds. No stridor. No wheezing, rhonchi or rales.   Chest:      Chest wall: No tenderness.   Abdominal:      General: Bowel sounds are normal. There is no distension.      Palpations: Abdomen is soft. There is no mass.      Tenderness: There is no abdominal tenderness. There is no guarding or rebound.      Hernia: No hernia is present.   Musculoskeletal:         General: No swelling, tenderness, deformity or signs of injury. Normal range of motion.      Cervical back: Normal range of motion and neck supple. No rigidity or tenderness.      Right lower leg: No edema (Trace only).      Left lower leg: Edema (Trace to 1+ with a recent knee injury on the side) present.   Lymphadenopathy:      Cervical: No cervical adenopathy.   Skin:     General: Skin is warm and dry.      Coloration: Skin is not jaundiced or pale.      Findings: No bruising, erythema, lesion or rash.   Neurological:      General: No focal deficit present.      Mental Status: She is alert  and oriented to person, place, and time. Mental status is at baseline.      Cranial Nerves: No cranial nerve deficit.      Sensory: No sensory deficit.      Motor: No weakness.      Coordination: Coordination normal.      Gait: Gait normal.      Deep Tendon Reflexes: Reflexes are normal and symmetric. Reflexes normal.   Psychiatric:         Mood and Affect: Mood normal.         Behavior: Behavior normal.         Thought Content: Thought content normal.         Judgment: Judgment normal.         Assessment:       1. Encounter for preventive health examination    2. Mild persistent asthma with exacerbation    3. Chronic hypoxemic respiratory failure    4. Centrilobular emphysema    5. Primary hypertension    6. Hyperlipidemia, unspecified hyperlipidemia type    7. Aortic atherosclerosis    8. Rheumatoid arthritis, involving unspecified site, unspecified whether rheumatoid factor present    9. Venous insufficiency    10. Interstitial pulmonary disease, unspecified    11. DANE (obstructive sleep apnea)    12. Hyperglycemia    13. Morbid obesity with BMI of 40.0-44.9, adult        Plan:       1. Encounter for preventive health examination  - Hemoglobin A1C; Future  - Lipid Panel; Future  - CBC Auto Differential; Future  - Comprehensive Metabolic Panel; Future    2. Mild persistent asthma with exacerbation  Asymptomatic and stable, followed by Pulmonary    3. Chronic hypoxemic respiratory failure  On portable oxygen generator, stable and followed by Pulmonary    4. Centrilobular emphysema  Stable and followed by Pulmonary, patient gets yearly pulmonary function testing    5. Primary hypertension  Well controlled, no medication changes needed  - Lipid Panel; Future  - CBC Auto Differential; Future  - Comprehensive Metabolic Panel; Future    6. Hyperlipidemia, unspecified hyperlipidemia type  Await lipid panel results for possible medication adjustment  - Lipid Panel; Future  - Comprehensive Metabolic Panel; Future    7.  Aortic atherosclerosis  Asymptomatic.  Monitor cholesterol, blood pressure, and glucose results    8. Rheumatoid arthritis, involving unspecified site, unspecified whether rheumatoid factor present  Stable not on disease altering medications at this time but she has been in the past    9. Venous insufficiency  Mild dependent edema worse on one side secondary to recent joint injury more proximally but no sign of DVTs    10. Interstitial pulmonary disease, unspecified  Stable, followed by Pulmonary    11. DANE (obstructive sleep apnea)  Stable    12. Hyperglycemia  Await A1c result and fasting glucose  - Hemoglobin A1C; Future    13. Morbid obesity with BMI of 40.0-44.9, adult  Slowly improving

## 2024-03-11 ENCOUNTER — HOSPITAL ENCOUNTER (OUTPATIENT)
Dept: RADIOLOGY | Facility: CLINIC | Age: 79
Discharge: HOME | End: 2024-03-11
Attending: FAMILY MEDICINE
Payer: MEDICARE

## 2024-03-11 DIAGNOSIS — Z12.31 SCREENING MAMMOGRAM FOR BREAST CANCER: ICD-10-CM

## 2024-03-12 ENCOUNTER — LAB VISIT (OUTPATIENT)
Dept: LAB | Facility: HOSPITAL | Age: 79
End: 2024-03-12
Attending: FAMILY MEDICINE
Payer: MEDICARE

## 2024-03-12 DIAGNOSIS — Z00.00 ENCOUNTER FOR PREVENTIVE HEALTH EXAMINATION: ICD-10-CM

## 2024-03-12 DIAGNOSIS — E78.5 HYPERLIPIDEMIA, UNSPECIFIED HYPERLIPIDEMIA TYPE: ICD-10-CM

## 2024-03-12 DIAGNOSIS — I10 PRIMARY HYPERTENSION: ICD-10-CM

## 2024-03-12 DIAGNOSIS — R73.9 HYPERGLYCEMIA: ICD-10-CM

## 2024-03-12 LAB
ALBUMIN SERPL BCP-MCNC: 3.5 G/DL (ref 3.5–5.2)
ALP SERPL-CCNC: 67 U/L (ref 55–135)
ALT SERPL W/O P-5'-P-CCNC: 14 U/L (ref 10–44)
ANION GAP SERPL CALC-SCNC: 10 MMOL/L (ref 8–16)
AST SERPL-CCNC: 15 U/L (ref 10–40)
BASOPHILS # BLD AUTO: 0.08 K/UL (ref 0–0.2)
BASOPHILS NFR BLD: 1.1 % (ref 0–1.9)
BILIRUB SERPL-MCNC: 0.9 MG/DL (ref 0.1–1)
BUN SERPL-MCNC: 13 MG/DL (ref 8–23)
CALCIUM SERPL-MCNC: 8.8 MG/DL (ref 8.7–10.5)
CHLORIDE SERPL-SCNC: 105 MMOL/L (ref 95–110)
CHOLEST SERPL-MCNC: 192 MG/DL (ref 120–199)
CHOLEST/HDLC SERPL: 5.5 {RATIO} (ref 2–5)
CO2 SERPL-SCNC: 25 MMOL/L (ref 23–29)
CREAT SERPL-MCNC: 0.8 MG/DL (ref 0.5–1.4)
DIFFERENTIAL METHOD BLD: ABNORMAL
EOSINOPHIL # BLD AUTO: 0.3 K/UL (ref 0–0.5)
EOSINOPHIL NFR BLD: 4.5 % (ref 0–8)
ERYTHROCYTE [DISTWIDTH] IN BLOOD BY AUTOMATED COUNT: 12.7 % (ref 11.5–14.5)
EST. GFR  (NO RACE VARIABLE): >60 ML/MIN/1.73 M^2
ESTIMATED AVG GLUCOSE: 128 MG/DL (ref 68–131)
GLUCOSE SERPL-MCNC: 128 MG/DL (ref 70–110)
HBA1C MFR BLD: 6.1 % (ref 4–5.6)
HCT VFR BLD AUTO: 42.1 % (ref 37–48.5)
HDLC SERPL-MCNC: 35 MG/DL (ref 40–75)
HDLC SERPL: 18.2 % (ref 20–50)
HGB BLD-MCNC: 13.3 G/DL (ref 12–16)
IMM GRANULOCYTES # BLD AUTO: 0.01 K/UL (ref 0–0.04)
IMM GRANULOCYTES NFR BLD AUTO: 0.1 % (ref 0–0.5)
LDLC SERPL CALC-MCNC: 89.4 MG/DL (ref 63–159)
LYMPHOCYTES # BLD AUTO: 2.8 K/UL (ref 1–4.8)
LYMPHOCYTES NFR BLD: 37.3 % (ref 18–48)
MCH RBC QN AUTO: 30.2 PG (ref 27–31)
MCHC RBC AUTO-ENTMCNC: 31.6 G/DL (ref 32–36)
MCV RBC AUTO: 96 FL (ref 82–98)
MONOCYTES # BLD AUTO: 0.7 K/UL (ref 0.3–1)
MONOCYTES NFR BLD: 9.4 % (ref 4–15)
NEUTROPHILS # BLD AUTO: 3.6 K/UL (ref 1.8–7.7)
NEUTROPHILS NFR BLD: 47.6 % (ref 38–73)
NONHDLC SERPL-MCNC: 157 MG/DL
NRBC BLD-RTO: 0 /100 WBC
PLATELET # BLD AUTO: 168 K/UL (ref 150–450)
PMV BLD AUTO: 10.4 FL (ref 9.2–12.9)
POTASSIUM SERPL-SCNC: 3.8 MMOL/L (ref 3.5–5.1)
PROT SERPL-MCNC: 7.5 G/DL (ref 6–8.4)
RBC # BLD AUTO: 4.41 M/UL (ref 4–5.4)
SODIUM SERPL-SCNC: 140 MMOL/L (ref 136–145)
TRIGL SERPL-MCNC: 338 MG/DL (ref 30–150)
WBC # BLD AUTO: 7.59 K/UL (ref 3.9–12.7)

## 2024-03-12 PROCEDURE — 85025 COMPLETE CBC W/AUTO DIFF WBC: CPT | Performed by: FAMILY MEDICINE

## 2024-03-12 PROCEDURE — 80053 COMPREHEN METABOLIC PANEL: CPT | Performed by: FAMILY MEDICINE

## 2024-03-12 PROCEDURE — 80061 LIPID PANEL: CPT | Performed by: FAMILY MEDICINE

## 2024-03-12 PROCEDURE — 83036 HEMOGLOBIN GLYCOSYLATED A1C: CPT | Performed by: FAMILY MEDICINE

## 2024-03-12 PROCEDURE — 36415 COLL VENOUS BLD VENIPUNCTURE: CPT | Performed by: FAMILY MEDICINE

## 2024-03-14 ENCOUNTER — HOSPITAL ENCOUNTER (OUTPATIENT)
Dept: RADIOLOGY | Facility: HOSPITAL | Age: 79
Discharge: HOME | End: 2024-03-14
Attending: FAMILY MEDICINE
Payer: MEDICARE

## 2024-03-14 DIAGNOSIS — M79.622 LEFT AXILLARY PAIN: ICD-10-CM

## 2024-03-14 DIAGNOSIS — R22.32 AXILLARY LUMP, LEFT: ICD-10-CM

## 2024-03-14 DIAGNOSIS — N63.32 LUMP OF AXILLARY TAIL OF LEFT BREAST: ICD-10-CM

## 2024-03-14 PROCEDURE — 76642 ULTRASOUND BREAST LIMITED: CPT | Mod: 26,LT,, | Performed by: RADIOLOGY

## 2024-03-14 PROCEDURE — 77062 BREAST TOMOSYNTHESIS BI: CPT | Mod: TC

## 2024-03-14 PROCEDURE — 77062 BREAST TOMOSYNTHESIS BI: CPT | Mod: 26,,, | Performed by: RADIOLOGY

## 2024-03-14 PROCEDURE — 77066 DX MAMMO INCL CAD BI: CPT | Mod: 26,,, | Performed by: RADIOLOGY

## 2024-03-14 PROCEDURE — 76642 ULTRASOUND BREAST LIMITED: CPT | Mod: TC,LT

## 2024-05-13 ENCOUNTER — OFFICE VISIT (OUTPATIENT)
Dept: PULMONOLOGY | Facility: CLINIC | Age: 79
End: 2024-05-13
Payer: MEDICARE

## 2024-05-13 VITALS
HEART RATE: 55 BPM | OXYGEN SATURATION: 95 % | DIASTOLIC BLOOD PRESSURE: 75 MMHG | HEIGHT: 60 IN | WEIGHT: 220 LBS | BODY MASS INDEX: 43.19 KG/M2 | SYSTOLIC BLOOD PRESSURE: 150 MMHG

## 2024-05-13 DIAGNOSIS — J96.11 CHRONIC HYPOXEMIC RESPIRATORY FAILURE: ICD-10-CM

## 2024-05-13 DIAGNOSIS — M06.9 RHEUMATOID ARTHRITIS, INVOLVING UNSPECIFIED SITE, UNSPECIFIED WHETHER RHEUMATOID FACTOR PRESENT: ICD-10-CM

## 2024-05-13 DIAGNOSIS — J45.30 MILD PERSISTENT ASTHMA, UNSPECIFIED WHETHER COMPLICATED: Primary | ICD-10-CM

## 2024-05-13 DIAGNOSIS — G47.33 OSA (OBSTRUCTIVE SLEEP APNEA): ICD-10-CM

## 2024-05-13 DIAGNOSIS — R06.00 DYSPNEA, UNSPECIFIED TYPE: ICD-10-CM

## 2024-05-13 PROCEDURE — 99214 OFFICE O/P EST MOD 30 MIN: CPT | Mod: S$GLB,,, | Performed by: NURSE PRACTITIONER

## 2024-05-13 RX ORDER — CLONAZEPAM 0.5 MG/1
1 TABLET ORAL NIGHTLY PRN
COMMUNITY
Start: 2023-09-11

## 2024-05-13 RX ORDER — FLUTICASONE FUROATE AND VILANTEROL 100; 25 UG/1; UG/1
1 POWDER RESPIRATORY (INHALATION)
COMMUNITY

## 2024-05-13 RX ORDER — ALBUTEROL SULFATE 1.25 MG/3ML
1.25 SOLUTION RESPIRATORY (INHALATION) EVERY 6 HOURS PRN
COMMUNITY

## 2024-05-13 NOTE — Clinical Note
Nuha Macias I just saw Ms. Rapp.  She complains of tightness and discomfort at times to mid chest, we had sent copy of her CT in December that mentioned cardiomegaly and CAD, I just want to make sure yall have seen it. I don't see mention of it on Dr. Benito last note, just worried some of her breathing issues may be some CAD as well.

## 2024-05-13 NOTE — PROGRESS NOTES
SUBJECTIVE:    Patient ID: Ling Rapp is a 78 y.o. female.    Chief Complaint: Apnea       Patient here today feeling well. She is using Breo daily. She does get short of breath with exertion at times. She is wearing her oxygen all of the time. She sleeps on her CPAP with oxygen bled in.   Her compliance report shows she is 100% compliant sleeps an average of 9 hours and 6 minutes with an AHI of 1.7. She feels heaviness and tightness in chest at times.  She has followed with her cardiologist since her last visit.  The Ct of her chest   Showed stable nodules and mild Mild scattered reticular interstitial lung markings are seen in the dependent lower lobes. Tiny stacked cysts and/or honeycombing.  Ct scan also shows Cardiomegaly with coronary artery calcifications most pronounced in the LAD and RCA distribution. She is on Enbrel for RA. She states she is under a lot of stress lately getting .     Past Medical History:   Diagnosis Date    ALLERGIC RHINITIS     Anticoagulant long-term use     Asthma     Cataract     Former smoker     GERD (gastroesophageal reflux disease)     Hemorrhoids     Hypertension     DANE (obstructive sleep apnea)     Osteopenia     Pancreatitis     secondary to severe hypertriglyceridemia    Peptic ulcer     Rheumatoid arthritis     Vitamin D deficiency      Past Surgical History:   Procedure Laterality Date    Athroscopy(knee)      BREAST BIOPSY Left 2017    benign    CARDIAC CATHETERIZATION      CATARACT EXTRACTION Left 2013    COLONOSCOPY N/A 3/30/2016    Procedure: COLONOSCOPY;  Surgeon: Thee Sorenson MD;  Location: [facility];  Service: Endoscopy;  Laterality: N/A;    EYE SURGERY      cataracts bilateral     FRACTURE SURGERY  1997    right knee     HYSTERECTOMY  1982    PARTIAL HYSTERECTOMY      w/ USO    TONSILLECTOMY      TRANSFORAMINAL EPIDURAL INJECTION OF STEROID Right 4/15/2019    Procedure: Injection,steroid,epidural,transforaminal approach;  Surgeon: Fernando KO  MD Gregory;  Location: [facility];  Service: Pain Management;  Laterality: Right;  L4-5, L5-S1    UPPER GASTROINTESTINAL ENDOSCOPY  5/2013     Family History   Problem Relation Name Age of Onset    Colon polyps Sister 1 64    Ulcers Father      Emphysema Father          smoker    Alcohol abuse Father      Thyroid disease Mother      Heart disease Mother      Emphysema Mother          smoker    Alzheimer's disease Brother 2     Stroke Brother 2     Cancer Brother 2     Throat cancer Brother 2         non smoker    No Known Problems Son 2     Diabetes Maternal Uncle          1/2 brother    Cancer Paternal Aunt      Early death Paternal Aunt      Leukemia Paternal Aunt          highschool    Breast cancer Paternal Aunt      Heart disease Paternal Uncle      Rheum arthritis Maternal Grandmother      Pneumonia Maternal Grandfather      Cancer Paternal Grandmother      Breast cancer Paternal Grandmother      Early death Paternal Grandfather           killed in line of duty        Social History:   Marital Status:   Occupation:retired    Alcohol History:  reports no history of alcohol use.  Tobacco History:  reports that she quit smoking about 52 years ago. Her smoking use included cigarettes. She started smoking about 62 years ago. She has a 10 pack-year smoking history. She has never used smokeless tobacco.  Drug History:  reports no history of drug use.    Review of patient's allergies indicates:   Allergen Reactions    Amoxicillin-pot clavulanate Diarrhea and Other (See Comments)     Sever cramping    Diltiazem Other (See Comments)     Extreme dry moth    Fenofibrate Other (See Comments)     Other reaction(s): Itching    Hydroxychloroquine      Other reaction(s): eye accomodation problems    Leflunomide      Other reaction(s): abd pains    Lisinopril-hydrochlorothiazide      Other reaction(s): diarrhea  Other reaction(s): cramps    Methotrexate      Other reaction(s): pancreatitis    Norvasc [amlodipine]  Swelling    Sulfa (sulfonamide antibiotics)      Other reaction(s): Swelling  Other reaction(s): Hives    Sulfamethoxazole-trimethoprim        Current Outpatient Medications   Medication Sig Dispense Refill    albuterol (ACCUNEB) 1.25 mg/3 mL Nebu Inhale 1.25 mg into the lungs every 6 (six) hours as needed.      albuterol (PROAIR HFA) 90 mcg/actuation inhaler Inhale 2 puffs into the lungs every 6 (six) hours as needed for Wheezing. 3 Inhaler 5    artificial saliva, yerbas-lyt, SprA Use as directed 240 mL 11    aspirin 81 mg Tab Take 81 mg by mouth once daily. Every day      atenolol (TENORMIN) 100 MG tablet TAKE 1 TABLET DAILY 90 tablet 2    azelastine (ASTELIN) 137 mcg (0.1 %) nasal spray 1 spray by Nasal route 2 (two) times daily.      calcium carbonate (OS-ADDY) 500 mg calcium (1,250 mg) tablet Take 1 tablet by mouth once daily.      cetirizine (ZYRTEC) 10 MG tablet Take 10 mg by mouth once daily. Every day      cholecalciferol, vitamin D3, 5,000 unit capsule Take 5,000 Units by mouth once daily. 5 a week      clonazePAM (KLONOPIN) 0.5 MG tablet Take 1 tablet by mouth nightly as needed.      cyanocobalamin (VITAMIN B-12) 100 MCG tablet Take 100 mcg by mouth once daily.      diclofenac sodium (VOLTAREN) 1 % Gel Apply 2 g topically once daily.      entanercept (ENBREL) 50 mg/mL injection 50 mg once a week. once weekly      fluticasone (FLONASE) 50 mcg/actuation nasal spray 2 sprays by Each Nare route once daily. Every day 48 g 3    fluticasone furoate-vilanteroL (BREO) 100-25 mcg/dose diskus inhaler Inhale 1 puff into the lungs.      fluticasone furoate-vilanteroL (BREO) 100-25 mcg/dose diskus inhaler Inhale 1 puff into the lungs.      magnesium oxide (MAG-OX) 400 mg tablet Take 400 mg by mouth once daily.      montelukast (SINGULAIR) 10 mg tablet TAKE 1 TABLET BY MOUTH EVERY DAY IN THE EVENING 90 tablet 3    olmesartan (BENICAR) 40 MG tablet Take 40 mg by mouth once daily.      omeprazole (PRILOSEC) 40 MG capsule  TAKE 1 CAPSULE TWICE DAILY BEFORE MEALS 180 capsule 2    spironolactone (ALDACTONE) 50 MG tablet       triamcinolone acetonide 0.1% (KENALOG) 0.1 % cream SMARTSIG:Sparingly Topical Twice Daily PRN      UBIDECARENONE (COQ-10 ORAL) Take 1 capsule by mouth once daily.       zinc gluconate 50 mg tablet Take 50 mg by mouth once daily.      benzonatate (TESSALON) 200 MG capsule Take 200 mg by mouth 3 (three) times daily as needed for Cough.      CALCIUM CARBONATE/VITAMIN D3 (OS-ADDY 500 + D) 500-125 mg-unit Tab Take 1 tablet by mouth once daily. Twice a day       No current facility-administered medications for this visit.       Last PFT: 11/2022 normal    Last Ct of chest 12/2023  IMPRESSION:  1. No acute cardiac or pulmonary process.  2. Unchanged pulmonary nodules as outlined above, with no new concerning pulmonary nodule/mass identified.  3. Findings of very mild UIP pattern of ILD.  4. Cardiomegaly with coronary artery calcifications most pronounced in the LAD and RCA distribution.     General:feeling well   Eyes: Vision is good.  ENT:  Post nasal drip  Heart:: chest tightness at times   Lungs: not coughing, dyspnea with exertion  GI: reflux controlled with Prilosec  : No dysuria, hesitancy, or nocturia.  Musculoskeletal:joint pain ok at present time,   Skin: No lesions or rashes.  Neuro: No headaches or neuropathy.  Lymph: swelling to legs better   Psych: stressed   Endo: weight stable      OBJECTIVE:      BP (!) 150/75 (BP Location: Right forearm, Patient Position: Sitting, BP Method: Small (Automatic)) Comment (BP Method): WRIST  Pulse (!) 55   Ht 5' (1.524 m)   Wt 99.8 kg (220 lb)   SpO2 95% Comment: 2LPM PD  BMI 42.97 kg/m²     Physical Exam  GENERAL: Older patient in no distress.  HEENT: Pupils equal and reactive. Extraocular movements intact. Nose intact.      Pharynx moist.  NECK: Supple.   HEART: Regular rate and rhythm. No murmur or gallop auscultated.  LUNGS:  Crackles bilaterally up to shoulder  blades   ABDOMEN: obese, Bowel sounds present. Non-tender, no masses palpated.  EXTREMITIES: Normal muscle tone and joint movement, clubbed nails  LYMPHATICS: No adenopathy palpated, 1+ edema.  SKIN: Dry, intact, no lesions.   NEURO: Cranial nerves II-XII intact. Motor strength 5/5 bilaterally, upper and lower extremities.  PSYCH: Appropriate affect.    Assessment:       1. Mild persistent asthma, unspecified whether complicated    2. Rheumatoid arthritis, involving unspecified site, unspecified whether rheumatoid factor present    3. DANE (obstructive sleep apnea)    4. Chronic hypoxemic respiratory failure    5. Dyspnea, unspecified type                  Plan:             Continue the Breo   Continue the oxygen  Keep sleeping on your CPAP with oxygen bled in  Chest xray   I Will message cardiology to make sure they got copy of the CT of chest patient had in December, concerned some of her dyspnea and tightness may be heart related.

## 2024-05-15 ENCOUNTER — HOSPITAL ENCOUNTER (OUTPATIENT)
Dept: RADIOLOGY | Facility: HOSPITAL | Age: 79
Discharge: HOME | End: 2024-05-15
Attending: NURSE PRACTITIONER
Payer: MEDICARE

## 2024-05-15 DIAGNOSIS — R06.00 DYSPNEA, UNSPECIFIED TYPE: ICD-10-CM

## 2024-05-15 PROCEDURE — 71046 X-RAY EXAM CHEST 2 VIEWS: CPT | Mod: 26,,, | Performed by: RADIOLOGY

## 2024-05-15 PROCEDURE — 71046 X-RAY EXAM CHEST 2 VIEWS: CPT | Mod: TC

## 2024-05-16 ENCOUNTER — TELEPHONE (OUTPATIENT)
Dept: PULMONOLOGY | Facility: CLINIC | Age: 79
End: 2024-05-16

## 2024-05-16 DIAGNOSIS — J84.9 INTERSTITIAL PULMONARY DISEASE, UNSPECIFIED: Primary | ICD-10-CM

## 2024-05-16 NOTE — TELEPHONE ENCOUNTER
Chest xray  Impression:     Mild perihilar pulmonary vascular prominence suggestive of mild pulmonary vascular congestion/trace edema in the appropriate clinical setting.

## 2024-05-17 NOTE — TELEPHONE ENCOUNTER
Called patient discussed. She had more crackles at her visit, worsening dyspnea.  Patient with RA.  Does take her diuretics.  Will order prone and supine CT to compare to last CT to evaluate for progression of ILD vs pulmonary edema

## 2024-05-22 ENCOUNTER — TELEPHONE (OUTPATIENT)
Dept: PULMONOLOGY | Facility: CLINIC | Age: 79
End: 2024-05-22

## 2024-05-22 ENCOUNTER — HOSPITAL ENCOUNTER (OUTPATIENT)
Dept: RADIOLOGY | Facility: HOSPITAL | Age: 79
Discharge: HOME | End: 2024-05-22
Attending: NURSE PRACTITIONER
Payer: MEDICARE

## 2024-05-22 DIAGNOSIS — J84.9 INTERSTITIAL PULMONARY DISEASE, UNSPECIFIED: ICD-10-CM

## 2024-05-22 PROCEDURE — 71250 CT THORAX DX C-: CPT | Mod: 26,,, | Performed by: RADIOLOGY

## 2024-05-22 PROCEDURE — 71250 CT THORAX DX C-: CPT | Mod: TC,PO

## 2024-05-22 NOTE — TELEPHONE ENCOUNTER
CT chest Impression:     1. There is minimal increase of subpleural reticulations in the lateral right upper lobe.  Bilateral scattered areas of interstitial thickening in the lungs are otherwise unchanged.  2. Scattered ground-glass lung opacities are unchanged.  3. New ground-glass nodular like lesion in the posterior right upper lobe measures 9 mm with adjacent focal bronchiectasis.  Follow-up CT chest in 3 months recommended.  4. Unchanged enlarged mediastinal lymph nodes.    Patient has rheumatoid lung disease.

## 2024-05-30 NOTE — TELEPHONE ENCOUNTER
Spoke with the patient discussed CT findings. Will repeat ct in 3 months. Will send copy of CT report to her rheumatologist Heriberto Mahajan MD at [facility] to see about making adjustments in Rheumatoid medications. She states she has been on Enbrel for 20 years.

## 2024-06-05 ENCOUNTER — TELEPHONE (OUTPATIENT)
Dept: PULMONOLOGY | Facility: CLINIC | Age: 79
End: 2024-06-05

## 2024-06-05 DIAGNOSIS — M06.9 RHEUMATOID ARTHRITIS, INVOLVING UNSPECIFIED SITE, UNSPECIFIED WHETHER RHEUMATOID FACTOR PRESENT: Primary | ICD-10-CM

## 2024-07-01 ENCOUNTER — TELEPHONE (OUTPATIENT)
Dept: RHEUMATOLOGY | Facility: CLINIC | Age: 79
End: 2024-07-01
Payer: MEDICARE

## 2024-07-01 NOTE — TELEPHONE ENCOUNTER
----- Message from Arturo Trinh sent at 7/1/2024 11:26 AM CDT -----  Contact: Self  Type:  Sooner Appointment Request    Caller is requesting a sooner appointment.  Caller declined first available appointment listed below.  Caller will not accept being placed on the waitlist and is requesting a message be sent to doctor.    Name of Caller:  Patient  When is the first available appointment?  NA  Symptoms:  RA  Would the patient rather a call back or a response via [facility]? Call Back  Best Call Back Number:  [phone]  Additional Information:  Patient was referred by Palma Levy for her RA

## 2024-07-01 NOTE — TELEPHONE ENCOUNTER
Let patient know that we were unable to schedule new patients at this time. I also let her know about the wait list with referral.

## 2024-07-02 ENCOUNTER — TELEPHONE (OUTPATIENT)
Dept: RHEUMATOLOGY | Facility: CLINIC | Age: 79
End: 2024-07-02
Payer: MEDICARE

## 2024-07-02 NOTE — TELEPHONE ENCOUNTER
----- Message from Jessika Shi sent at 7/1/2024 11:04 AM CDT -----  Type:  Sooner Appointment Request    Caller is requesting a sooner appointment.  Caller declined first available appointment listed below.  Caller will not accept being placed on the waitlist and is requesting a message be sent to doctor.    Name of Caller:  pt  When is the first available appointment?  N/A  Symptoms:  rheum arthritis  Would the patient rather a call back or a response via [facility]? Call back, portal  Best Call Back Number:  [phone]  Additional Information:  pt states that first available does not work for them and needs to be seen for rheum arthritis. Pt needs to schedule with a new provider taking new pts. Needs to speak to a nurse in office. Please call back and advise. Thanks!

## 2024-07-07 DIAGNOSIS — J45.909 ASTHMA, UNSPECIFIED ASTHMA SEVERITY, UNSPECIFIED WHETHER COMPLICATED, UNSPECIFIED WHETHER PERSISTENT: ICD-10-CM

## 2024-07-07 RX ORDER — MONTELUKAST SODIUM 10 MG/1
TABLET ORAL
Qty: 90 TABLET | Refills: 3 | Status: SHIPPED | OUTPATIENT
Start: 2024-07-07

## 2024-08-01 DIAGNOSIS — J96.11 CHRONIC HYPOXEMIC RESPIRATORY FAILURE: Primary | ICD-10-CM

## 2024-08-01 DIAGNOSIS — J84.9 INTERSTITIAL PULMONARY DISEASE, UNSPECIFIED: ICD-10-CM

## 2024-08-01 RX ORDER — FLUTICASONE FUROATE AND VILANTEROL 100; 25 UG/1; UG/1
1 POWDER RESPIRATORY (INHALATION) DAILY
Qty: 60 EACH | Refills: 2 | Status: SHIPPED | OUTPATIENT
Start: 2024-08-01 | End: 2024-08-31

## 2024-08-06 ENCOUNTER — TELEPHONE (OUTPATIENT)
Dept: PULMONOLOGY | Facility: CLINIC | Age: 79
End: 2024-08-06
Payer: MEDICARE

## 2024-08-06 DIAGNOSIS — J84.9 ILD (INTERSTITIAL LUNG DISEASE): ICD-10-CM

## 2024-08-06 DIAGNOSIS — J84.9 INTERSTITIAL PULMONARY DISEASE, UNSPECIFIED: Primary | ICD-10-CM

## 2024-08-19 ENCOUNTER — HOSPITAL ENCOUNTER (OUTPATIENT)
Dept: RADIOLOGY | Facility: HOSPITAL | Age: 79
Discharge: HOME | End: 2024-08-19
Attending: NURSE PRACTITIONER
Payer: MEDICARE

## 2024-08-19 ENCOUNTER — TELEPHONE (OUTPATIENT)
Dept: PULMONOLOGY | Facility: CLINIC | Age: 79
End: 2024-08-19
Payer: MEDICARE

## 2024-08-19 DIAGNOSIS — J84.9 INTERSTITIAL PULMONARY DISEASE, UNSPECIFIED: ICD-10-CM

## 2024-08-19 PROCEDURE — 71250 CT THORAX DX C-: CPT | Mod: TC,PO

## 2024-08-19 PROCEDURE — 71250 CT THORAX DX C-: CPT | Mod: 26,,, | Performed by: RADIOLOGY

## 2024-08-19 NOTE — TELEPHONE ENCOUNTER
Ct chest   Impression:     1. Interval resolution of previous localized bronchiolitis in the right upper lobe.  2. Generally stable appearance of scattered nonspecific interstitial lung disease.  Usual interstitial pneumonia, drug induced interstitial lung disease, smoking related interstitial lung disease, connective tissue disorder, or various other potential etiologies could have this appearance.  3. Cardiomegaly, with aortic and coronary arterial calcifications.  4. Small hiatal hernia.

## 2024-08-22 ENCOUNTER — PATIENT MESSAGE (OUTPATIENT)
Dept: FAMILY MEDICINE | Facility: CLINIC | Age: 79
End: 2024-08-22
Payer: MEDICARE

## 2024-08-29 ENCOUNTER — OFFICE VISIT (OUTPATIENT)
Dept: FAMILY MEDICINE | Facility: CLINIC | Age: 79
End: 2024-08-29
Payer: MEDICARE

## 2024-08-29 VITALS
OXYGEN SATURATION: 96 % | WEIGHT: 220.25 LBS | TEMPERATURE: 98 F | HEART RATE: 62 BPM | DIASTOLIC BLOOD PRESSURE: 76 MMHG | SYSTOLIC BLOOD PRESSURE: 138 MMHG | BODY MASS INDEX: 43.24 KG/M2 | HEIGHT: 60 IN

## 2024-08-29 DIAGNOSIS — M25.562 CHRONIC PAIN OF LEFT KNEE: ICD-10-CM

## 2024-08-29 DIAGNOSIS — R26.9 ABNORMALITY OF GAIT AND MOBILITY: ICD-10-CM

## 2024-08-29 DIAGNOSIS — Z99.81 DEPENDENCE ON SUPPLEMENTAL OXYGEN: ICD-10-CM

## 2024-08-29 DIAGNOSIS — M06.9 RHEUMATOID ARTHRITIS, INVOLVING UNSPECIFIED SITE, UNSPECIFIED WHETHER RHEUMATOID FACTOR PRESENT: ICD-10-CM

## 2024-08-29 DIAGNOSIS — G89.29 CHRONIC PAIN OF LEFT KNEE: ICD-10-CM

## 2024-08-29 DIAGNOSIS — Z00.00 ENCOUNTER FOR PREVENTIVE HEALTH EXAMINATION: Primary | ICD-10-CM

## 2024-08-29 DIAGNOSIS — E66.01 CLASS 3 SEVERE OBESITY DUE TO EXCESS CALORIES WITH SERIOUS COMORBIDITY AND BODY MASS INDEX (BMI) OF 40.0 TO 44.9 IN ADULT: ICD-10-CM

## 2024-08-29 DIAGNOSIS — I10 PRIMARY HYPERTENSION: ICD-10-CM

## 2024-08-29 PROCEDURE — 99999 PR PBB SHADOW E&M-EST. PATIENT-LVL V: CPT | Mod: PBBFAC,,, | Performed by: NURSE PRACTITIONER

## 2024-08-29 PROCEDURE — G0439 PPPS, SUBSEQ VISIT: HCPCS | Mod: ,,, | Performed by: NURSE PRACTITIONER

## 2024-08-29 PROCEDURE — 99215 OFFICE O/P EST HI 40 MIN: CPT | Mod: PBBFAC,PO | Performed by: NURSE PRACTITIONER

## 2024-08-29 NOTE — PROGRESS NOTES
Ling Rapp presented for a  Medicare AWV and comprehensive Health Risk Assessment today. The following components were reviewed and updated:    Medical history  Family History  Social history  Allergies and Current Medications  Health Risk Assessment  Health Maintenance  Care Team         ** See Completed Assessments for Annual Wellness Visit within the encounter summary.**         The following assessments were completed:  Living Situation  CAGE  Depression Screening  Timed Get Up and Go  Whisper Test  Cognitive Function Screening  Nutrition Screening  ADL Screening  PAQ Screening    Clock in media   Opioid documentation:      Patient does not have a current opioid prescription.        Vitals:    08/29/24 1059   BP: 138/76   Pulse: 62   Temp: 98.3 °F (36.8 °C)   TempSrc: Oral   SpO2: 96%   Weight: 99.9 kg (220 lb 3.8 oz)   Height: 5' (1.524 m)     Body mass index is 43.01 kg/m².  Physical Exam  Constitutional:       Appearance: She is well-developed.   HENT:      Head: Normocephalic and atraumatic.      Nose: Nose normal.   Eyes:      General: Lids are normal.      Conjunctiva/sclera: Conjunctivae normal.   Cardiovascular:      Rate and Rhythm: Normal rate.   Pulmonary:      Effort: Pulmonary effort is normal.   Neurological:      Mental Status: She is alert and oriented to person, place, and time.   Psychiatric:         Speech: Speech normal.         Behavior: Behavior normal.               Diagnoses and health risks identified today and associated recommendations/orders:    1. Encounter for preventive health examination  Discussed health maintenance guidelines appropriate for age.        2. Class 3 severe obesity due to excess calories with serious comorbidity and body mass index (BMI) of 40.0 to 44.9 in adult  Uncontrolled, Counseled patient on his ideal body weight, health consequences of being obese and current recommendations including weekly exercise and a heart healthy diet.  Current BMI  is:Estimated body mass index is 43.01 kg/m² as calculated from the following:    Height as of this encounter: 5' (1.524 m).    Weight as of this encounter: 99.9 kg (220 lb 3.8 oz)..  Patient is aware that ideal BMI < 25 or Weight in (lb) to have BMI = 25: 127.7.      3. Rheumatoid arthritis, involving unspecified site, unspecified whether rheumatoid factor present  Sub optimal control continue care and follow up per rheumatology     4. Chronic pain of left knee    - Ambulatory referral/consult to Orthopedics; Future    5. Dependence on supplemental oxygen  Stable, continue current use     6. Abnormality of gait and mobility  Stable, continue to monitor     7. Primary hypertension  Controlled, continue current medication regimen  Low salt diet  Increase physical activity  Followed by pcp        Provided Ling with a 5-10 year written screening schedule and personal prevention plan. Recommendations were developed using the USPSTF age appropriate recommendations. Education, counseling, and referrals were provided as needed. After Visit Summary printed and given to patient which includes a list of additional screenings\tests needed.    Follow up for One year for Annual Wellness Visit.    Cady Lay NP      I offered to discuss advanced care planning, including how to pick a person who would make decisions for you if you were unable to make them for yourself, called a health care power of , and what kind of decisions you might make such as use of life sustaining treatments such as ventilators and tube feeding when faced with a life limiting illness recorded on a living will that they will need to know. (How you want to be cared for as you near the end of your natural life)     X  Patient has advanced directives written and agrees to provide copies to the institution.

## 2024-08-29 NOTE — PATIENT INSTRUCTIONS
Counseling and Referral of Other Preventative  (Italic type indicates deductible and co-insurance are waived)    Patient Name: Ling Rapp  Today's Date: 8/29/2024    Health Maintenance       Date Due Completion Date    Shingles Vaccine (1 of 2) Never done ---    RSV Vaccine (Age 60+ and Pregnant patients) (1 - 1-dose 60+ series) Never done ---    Colonoscopy 03/30/2021 3/30/2016    Override on 1/29/2007: Done ([facility]--Dr Sorenson--in legacy)    TETANUS VACCINE 04/14/2021 4/14/2011    COVID-19 Vaccine (4 - 2023-24 season) 09/01/2023 11/12/2021    Influenza Vaccine (1) 09/01/2024 9/11/2023    Hemoglobin A1c (Prediabetes) 03/12/2025 3/12/2024    DEXA Scan 01/22/2027 1/22/2024    Lipid Panel 03/12/2029 3/12/2024        Orders Placed This Encounter   Procedures    Ambulatory referral/consult to Orthopedics     The following information is provided to all patients.  This information is to help you find resources for any of the problems found today that may be affecting your health:                  Living healthy guide: ms.gov    Understanding Diabetes: www.diabetes.org      Eating healthy: www.cdc.gov/healthyweight      CDC home safety checklist: www.cdc.gov/steadi/patient.html      Agency on Aging: ms.gov    Alcoholics anonymous (AA): www.aa.org      Physical Activity: www.mahendra.nih.gov/cq3rnod      Tobacco use: ms.gov

## 2024-09-03 DIAGNOSIS — M25.562 LEFT KNEE PAIN, UNSPECIFIED CHRONICITY: Primary | ICD-10-CM

## 2024-09-04 ENCOUNTER — OFFICE VISIT (OUTPATIENT)
Dept: ORTHOPEDICS | Facility: CLINIC | Age: 79
End: 2024-09-04
Payer: MEDICARE

## 2024-09-04 ENCOUNTER — HOSPITAL ENCOUNTER (OUTPATIENT)
Dept: RADIOLOGY | Facility: HOSPITAL | Age: 79
Discharge: HOME | End: 2024-09-04
Attending: ORTHOPAEDIC SURGERY
Payer: MEDICARE

## 2024-09-04 VITALS — HEIGHT: 60 IN | WEIGHT: 220 LBS | BODY MASS INDEX: 43.19 KG/M2

## 2024-09-04 DIAGNOSIS — M25.562 CHRONIC PAIN OF LEFT KNEE: ICD-10-CM

## 2024-09-04 DIAGNOSIS — M25.562 LEFT KNEE PAIN, UNSPECIFIED CHRONICITY: ICD-10-CM

## 2024-09-04 DIAGNOSIS — M17.0 PRIMARY OSTEOARTHRITIS OF BOTH KNEES: Primary | ICD-10-CM

## 2024-09-04 DIAGNOSIS — G89.29 CHRONIC PAIN OF LEFT KNEE: ICD-10-CM

## 2024-09-04 PROCEDURE — 99999 PR PBB SHADOW E&M-EST. PATIENT-LVL IV: CPT | Mod: PBBFAC,,, | Performed by: ORTHOPAEDIC SURGERY

## 2024-09-04 PROCEDURE — 73562 X-RAY EXAM OF KNEE 3: CPT | Mod: 26,59,RT, | Performed by: RADIOLOGY

## 2024-09-04 PROCEDURE — 73564 X-RAY EXAM KNEE 4 OR MORE: CPT | Mod: 26,LT,, | Performed by: RADIOLOGY

## 2024-09-04 PROCEDURE — 20610 DRAIN/INJ JOINT/BURSA W/O US: CPT | Mod: PBBFAC,PO | Performed by: ORTHOPAEDIC SURGERY

## 2024-09-04 PROCEDURE — 73564 X-RAY EXAM KNEE 4 OR MORE: CPT | Mod: TC,PO,LT

## 2024-09-04 PROCEDURE — 99214 OFFICE O/P EST MOD 30 MIN: CPT | Mod: PBBFAC,25,PO | Performed by: ORTHOPAEDIC SURGERY

## 2024-09-04 PROCEDURE — 99999PBSHW PR PBB SHADOW TECHNICAL ONLY FILED TO HB: Mod: PBBFAC,,,

## 2024-09-04 RX ORDER — TRIAMCINOLONE ACETONIDE 40 MG/ML
40 INJECTION, SUSPENSION INTRA-ARTICULAR; INTRAMUSCULAR
Status: DISCONTINUED | OUTPATIENT
Start: 2024-09-04 | End: 2024-09-04 | Stop reason: HOSPADM

## 2024-09-04 RX ADMIN — TRIAMCINOLONE ACETONIDE 40 MG: 40 INJECTION, SUSPENSION INTRA-ARTICULAR; INTRAMUSCULAR at 02:09

## 2024-09-04 NOTE — PROCEDURES
Large Joint Aspiration/Injection: L knee    Date/Time: 9/4/2024 2:15 PM    Performed by: Da Chávez MD  Authorized by: Da Chávez MD    Consent Done?:  Yes (Verbal)  Indications:  Pain  Site marked: the procedure site was marked    Timeout: prior to procedure the correct patient, procedure, and site was verified    Local anesthetic:  Lidocaine 1% without epinephrine and bupivacaine 0.25% without epinephrine  Anesthetic total (ml):  6      Details:  Needle Size:  20 G  Approach:  Anterolateral  Location:  Knee  Site:  L knee  Medications:  40 mg triamcinolone acetonide 40 mg/mL  Patient tolerance:  Patient tolerated the procedure well with no immediate complications

## 2024-09-04 NOTE — PROGRESS NOTES
Subjective:      Patient ID: Ling Rapp is a 78 y.o. female.    Chief Complaint: Pain of the Left Knee    HPI  78-year-old female long history of bilateral knee pain.  Left knee she feels like he is more painful status post a fall several years ago.  She had an injection at some point which sounds like it was ultrasound guided.  She is complaining of pain with prolonged standing and walking.  Last injection was greater than [place] ago.  ROS      Objective:    Ortho Exam     Constitutional:   Patient is alert  and oriented in no acute distress  HEENT:  normocephalic atraumatic; PERRL EOMI  Neck:  Supple without adenopathy  Cardiovascular:  Normal rate and rhythm  Pulmonary:  Normal respiratory effort normal chest wall expansion  Abdominal:  Nonprotuberant nondistended  Musculoskeletal:  Patient has a mildly antalgic gait  She has crepitus with range of motion of both knees  She has diffuse joint line tenderness  She has chronic venous stasis changes bilateral lower extremities  Neurological:  No focal defect; cranial nerves 2-12 grossly intact  Psychiatric/behavioral:  Mood and behavior normal      X-ray Knee Ortho Left with Flexion  Narrative: EXAMINATION:  XR KNEE ORTHO LEFT WITH FLEXION    CLINICAL HISTORY:  . Pain in left knee    TECHNIQUE:  AP standing view of both knees, PA flexion standing views of both knees, and Merchant views of both knees were performed. A lateral view of the left knee was also performed.    COMPARISON:  Left knee x-ray of December 7, 2021    FINDINGS:  On the left there is marked narrowing of the medial intercondylar joint space.  Mild spur formation is noted of the femur tibia and patella.  A fracture or joint effusion is not seen.  Impression: Moderate osteoarthritis left knee.    Electronically signed by: Den Braswell MD  Date:    09/04/2024  Time:    14:18       My Radiographs Findings:    I have personally reviewed radiographs and concur with above  findings    Assessment:       Encounter Diagnoses   Name Primary?    Chronic pain of left knee     Primary osteoarthritis of both knees Yes         Plan:       I have discussed medical condition and treatment options her at length.  After a verbal consent and sterile prep I injected her most symptomatic left knee today without complication.  We have discussed general knee rehab exercises medications per her PCP P.  Follow up can be as needed.        Past Medical History:   Diagnosis Date    ALLERGIC RHINITIS     Anticoagulant long-term use     Asthma     Bursitis 2009    Cataract     Former smoker     GERD (gastroesophageal reflux disease)     Hemorrhoids     Hypertension     DANE (obstructive sleep apnea)     Osteopenia     Osteoporosis 2010    Pancreatitis     secondary to severe hypertriglyceridemia    Peptic ulcer     Rheumatoid arthritis     Vitamin D deficiency      Past Surgical History:   Procedure Laterality Date    Athroscopy(knee)      BREAST BIOPSY Left 2017    benign    CARDIAC CATHETERIZATION      CATARACT EXTRACTION Left 2013    COLONOSCOPY N/A 03/30/2016    Procedure: COLONOSCOPY;  Surgeon: Thee Sorenson MD;  Location: [facility];  Service: Endoscopy;  Laterality: N/A;    EYE SURGERY      cataracts bilateral     FRACTURE SURGERY  1997    right knee     HYSTERECTOMY  1982    KNEE ARTHROSCOPY  1996    KNEE SURGERY  1996    Scope knee    PARTIAL HYSTERECTOMY      w/ USO    TONSILLECTOMY      TRANSFORAMINAL EPIDURAL INJECTION OF STEROID Right 04/15/2019    Procedure: Injection,steroid,epidural,transforaminal approach;  Surgeon: Fernando Jenkins MD;  Location: [facility];  Service: Pain Management;  Laterality: Right;  L4-5, L5-S1    UPPER GASTROINTESTINAL ENDOSCOPY  05/2013         Current Outpatient Medications:     albuterol (ACCUNEB) 1.25 mg/3 mL Nebu, Inhale 1.25 mg into the lungs every 6 (six) hours as needed., Disp: , Rfl:     albuterol (PROAIR HFA) 90 mcg/actuation inhaler, Inhale 2 puffs into the  lungs every 6 (six) hours as needed for Wheezing., Disp: 3 Inhaler, Rfl: 5    artificial saliva, yerbas-lyt, SprA, Use as directed, Disp: 240 mL, Rfl: 11    aspirin 81 mg Tab, Take 81 mg by mouth once daily. Every day, Disp: , Rfl:     atenolol (TENORMIN) 100 MG tablet, TAKE 1 TABLET DAILY, Disp: 90 tablet, Rfl: 2    azelastine (ASTELIN) 137 mcg (0.1 %) nasal spray, 1 spray by Nasal route 2 (two) times daily., Disp: , Rfl:     benzonatate (TESSALON) 200 MG capsule, Take 200 mg by mouth 3 (three) times daily as needed for Cough., Disp: , Rfl:     CALCIUM CARBONATE/VITAMIN D3 (OS-ADDY 500 + D) 500-125 mg-unit Tab, Take 1 tablet by mouth once daily. Twice a day, Disp: , Rfl:     cetirizine (ZYRTEC) 10 MG tablet, Take 10 mg by mouth once daily. Every day, Disp: , Rfl:     cholecalciferol, vitamin D3, 5,000 unit capsule, Take 5,000 Units by mouth once daily. 5 a week, Disp: , Rfl:     clonazePAM (KLONOPIN) 0.5 MG tablet, Take 1 tablet by mouth nightly as needed. (Patient not taking: Reported on 8/29/2024), Disp: , Rfl:     cyanocobalamin (VITAMIN B-12) 100 MCG tablet, Take 100 mcg by mouth once daily., Disp: , Rfl:     diclofenac sodium (VOLTAREN) 1 % Gel, Apply 2 g topically once daily., Disp: , Rfl:     entanercept (ENBREL) 50 mg/mL injection, 50 mg once a week. once weekly, Disp: , Rfl:     fluticasone (FLONASE) 50 mcg/actuation nasal spray, 2 sprays by Each Nare route once daily. Every day, Disp: 48 g, Rfl: 3    fluticasone furoate-vilanteroL (BREO) 100-25 mcg/dose diskus inhaler, Inhale 1 puff into the lungs once daily., Disp: 60 each, Rfl: 2    magnesium oxide (MAG-OX) 400 mg tablet, Take 400 mg by mouth once daily., Disp: , Rfl:     montelukast (SINGULAIR) 10 mg tablet, TAKE 1 TABLET BY MOUTH EVERY DAY IN THE EVENING, Disp: 90 tablet, Rfl: 3    olmesartan (BENICAR) 40 MG tablet, Take 40 mg by mouth once daily., Disp: , Rfl:     omeprazole (PRILOSEC) 40 MG capsule, TAKE 1 CAPSULE TWICE DAILY BEFORE MEALS, Disp: 180  capsule, Rfl: 2    spironolactone (ALDACTONE) 50 MG tablet, , Disp: , Rfl:     triamcinolone acetonide 0.1% (KENALOG) 0.1 % cream, SMARTSIG:Sparingly Topical Twice Daily PRN, Disp: , Rfl:     UBIDECARENONE (COQ-10 ORAL), Take 1 capsule by mouth once daily. , Disp: , Rfl:     zinc gluconate 50 mg tablet, Take 50 mg by mouth once daily., Disp: , Rfl:     Review of patient's allergies indicates:   Allergen Reactions    Amoxicillin-pot clavulanate Diarrhea and Other (See Comments)     Sever cramping    Diltiazem Other (See Comments)     Extreme dry moth    Fenofibrate Other (See Comments)     Other reaction(s): Itching    Leflunomide      Other reaction(s): abd pains    Lisinopril-hydrochlorothiazide      Other reaction(s): diarrhea  Other reaction(s): cramps    Methotrexate      Other reaction(s): pancreatitis    Norvasc [amlodipine] Swelling    Sulfa (sulfonamide antibiotics)      Other reaction(s): Swelling  Other reaction(s): Hives    Sulfamethoxazole-trimethoprim     Trimethoprim Other (See Comments)     Unknown       Family History   Problem Relation Name Age of Onset    Thyroid disease Mother      Heart disease Mother      Emphysema Mother          smoker    Ulcers Father      Emphysema Father          smoker    Alcohol abuse Father      Colon polyps Sister  64    Alzheimer's disease Brother Doug Smith     Stroke Brother Doug Smith     Cancer Brother Doug Smith     Throat cancer Brother Doug Smith         non smoker    No Known Problems Son      Diabetes Maternal Uncle Joseph Hudson         1/2 brother    Cancer Paternal Aunt None     Early death Paternal Aunt None     Leukemia Paternal Aunt None         highschool    Breast cancer Paternal Aunt None     Heart disease Paternal Uncle      Rheum arthritis Maternal Grandmother      Pneumonia Maternal Grandfather      Cancer Paternal Grandmother None     Breast cancer Paternal Grandmother None     Early death Paternal Grandfather           killed  in line of duty     Social History     Occupational History    Occupation: retired hairdresser    Tobacco Use    Smoking status: Former     Current packs/day: 0.00     Average packs/day: 1.1 packs/day for 13.9 years (15.0 ttl pk-yrs)     Types: Cigarettes     Start date: 1960     Quit date: 1972     Years since quittin.7    Smokeless tobacco: Never    Tobacco comments:     quit    Substance and Sexual Activity    Alcohol use: No    Drug use: No    Sexual activity: Not Currently     Partners: Male     Birth control/protection: Abstinence, Partner-Vasectomy, None

## 2024-09-06 PROBLEM — E66.813 CLASS 3 SEVERE OBESITY DUE TO EXCESS CALORIES WITH SERIOUS COMORBIDITY AND BODY MASS INDEX (BMI) OF 40.0 TO 44.9 IN ADULT: Status: ACTIVE | Noted: 2020-11-04

## 2024-10-23 ENCOUNTER — HOSPITAL ENCOUNTER (OUTPATIENT)
Dept: RADIOLOGY | Facility: HOSPITAL | Age: 79
Discharge: HOME | End: 2024-10-23
Attending: FAMILY MEDICINE
Payer: MEDICARE

## 2024-10-23 ENCOUNTER — OFFICE VISIT (OUTPATIENT)
Dept: FAMILY MEDICINE | Facility: CLINIC | Age: 79
End: 2024-10-23
Payer: MEDICARE

## 2024-10-23 VITALS
RESPIRATION RATE: 14 BRPM | OXYGEN SATURATION: 98 % | HEIGHT: 60 IN | BODY MASS INDEX: 42.21 KG/M2 | DIASTOLIC BLOOD PRESSURE: 62 MMHG | SYSTOLIC BLOOD PRESSURE: 128 MMHG | WEIGHT: 215 LBS | HEART RATE: 63 BPM

## 2024-10-23 DIAGNOSIS — R73.03 PREDIABETES: ICD-10-CM

## 2024-10-23 DIAGNOSIS — M76.61 ACHILLES TENDINITIS OF RIGHT LOWER EXTREMITY: Primary | ICD-10-CM

## 2024-10-23 DIAGNOSIS — M76.61 ACHILLES TENDINITIS OF RIGHT LOWER EXTREMITY: ICD-10-CM

## 2024-10-23 DIAGNOSIS — Z99.81 DEPENDENCE ON SUPPLEMENTAL OXYGEN: ICD-10-CM

## 2024-10-23 DIAGNOSIS — Z76.89 ENCOUNTER TO ESTABLISH CARE WITH NEW DOCTOR: ICD-10-CM

## 2024-10-23 DIAGNOSIS — I10 PRIMARY HYPERTENSION: ICD-10-CM

## 2024-10-23 DIAGNOSIS — E78.5 HYPERLIPIDEMIA, UNSPECIFIED HYPERLIPIDEMIA TYPE: ICD-10-CM

## 2024-10-23 DIAGNOSIS — Z79.899 OTHER LONG TERM (CURRENT) DRUG THERAPY: ICD-10-CM

## 2024-10-23 DIAGNOSIS — M06.9 RHEUMATOID ARTHRITIS, INVOLVING UNSPECIFIED SITE, UNSPECIFIED WHETHER RHEUMATOID FACTOR PRESENT: ICD-10-CM

## 2024-10-23 DIAGNOSIS — E55.9 VITAMIN D DEFICIENCY: ICD-10-CM

## 2024-10-23 DIAGNOSIS — E66.01 CLASS 3 SEVERE OBESITY DUE TO EXCESS CALORIES WITH SERIOUS COMORBIDITY AND BODY MASS INDEX (BMI) OF 40.0 TO 44.9 IN ADULT: ICD-10-CM

## 2024-10-23 DIAGNOSIS — E66.813 CLASS 3 SEVERE OBESITY DUE TO EXCESS CALORIES WITH SERIOUS COMORBIDITY AND BODY MASS INDEX (BMI) OF 40.0 TO 44.9 IN ADULT: ICD-10-CM

## 2024-10-23 PROCEDURE — 99999 PR PBB SHADOW E&M-EST. PATIENT-LVL V: CPT | Mod: PBBFAC,,, | Performed by: FAMILY MEDICINE

## 2024-10-23 PROCEDURE — 99215 OFFICE O/P EST HI 40 MIN: CPT | Mod: S$PBB,,, | Performed by: FAMILY MEDICINE

## 2024-10-23 PROCEDURE — 73630 X-RAY EXAM OF FOOT: CPT | Mod: TC,RT

## 2024-10-23 PROCEDURE — 73630 X-RAY EXAM OF FOOT: CPT | Mod: 26,RT,, | Performed by: RADIOLOGY

## 2024-10-23 PROCEDURE — G2211 COMPLEX E/M VISIT ADD ON: HCPCS | Mod: S$PBB,,, | Performed by: FAMILY MEDICINE

## 2024-10-23 PROCEDURE — 99215 OFFICE O/P EST HI 40 MIN: CPT | Mod: PBBFAC,25 | Performed by: FAMILY MEDICINE
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
## 2024-10-23 NOTE — PATIENT INSTRUCTIONS
Figueroa Dubon,     If you are due for any health screening(s) below please notify me so we can arrange them to be ordered and scheduled to maintain your health. Most healthy patients complete it. Don't lose out on improving your health.     All of your core healthy metrics are met.                               Thank you for allowing me to be part of your healthcare team at [facility]. It is a pleasure to care for you today.   Please take all of your medications as instructed and follow all new instructions from your visit today.  If you received labs or medical tests today you should hear information about results or scheduling either by phone or mychart within approximately a week.   If you have any questions or concerns please do not hesitate to call. Have a blessed day and I hope to see you again soon.  Jamee Dubon,     If you are due for any health screening(s) below please notify me so we can arrange them to be ordered and scheduled. Most healthy patients at your age complete them, but you are free to accept or refuse.     If you can't do it, I'll definitely understand. If you can, I'd certainly appreciate it!    All of your core healthy metrics are met.

## 2024-10-24 ENCOUNTER — OFFICE VISIT (OUTPATIENT)
Dept: PODIATRY | Facility: CLINIC | Age: 79
End: 2024-10-24
Payer: MEDICARE

## 2024-10-24 VITALS
SYSTOLIC BLOOD PRESSURE: 138 MMHG | DIASTOLIC BLOOD PRESSURE: 58 MMHG | BODY MASS INDEX: 41.99 KG/M2 | HEART RATE: 57 BPM | RESPIRATION RATE: 18 BRPM | HEIGHT: 60 IN

## 2024-10-24 DIAGNOSIS — M76.61 ACHILLES TENDINITIS OF RIGHT LOWER EXTREMITY: Primary | ICD-10-CM

## 2024-10-24 DIAGNOSIS — M77.31 POSTERIOR CALCANEAL SPUR OF RIGHT FOOT: ICD-10-CM

## 2024-10-24 DIAGNOSIS — M79.671 PAIN OF RIGHT HEEL: ICD-10-CM

## 2024-10-24 PROCEDURE — 99999 PR PBB SHADOW E&M-EST. PATIENT-LVL V: CPT | Mod: PBBFAC,,, | Performed by: PODIATRIST

## 2024-10-24 PROCEDURE — 99215 OFFICE O/P EST HI 40 MIN: CPT | Mod: PBBFAC | Performed by: PODIATRIST

## 2024-10-24 PROCEDURE — 99203 OFFICE O/P NEW LOW 30 MIN: CPT | Mod: S$PBB,,, | Performed by: PODIATRIST

## 2024-10-27 NOTE — PROGRESS NOTES
Subjective:       Patient ID: Ling Rapp is a 78 y.o. female.    Chief Complaint: Heel Pain  Patient presents via referral PCP, Dr. Bonilla for Achilles tendinitis, pain posterior right calcaneus  Patient relates this has been present for 8 months  She does better in the morning in the pain gets worse with walking throughout the day  Denies change in shoes or activity, no injury, however does have a lot of trouble with her knees  Relates she has never barefoot, difficult to get tennis shoes on, her most comfortable shoe is a slip-on with a thick sole on the back of the heel  Pain level in the back of the right heel 4/10    Past Medical History:   Diagnosis Date    ALLERGIC RHINITIS     Anticoagulant long-term use     Asthma     Bursitis 2009    Cataract     Former smoker     GERD (gastroesophageal reflux disease)     Hemorrhoids     Hypertension     DANE (obstructive sleep apnea)     Osteopenia     Osteoporosis 2010    Pancreatitis     secondary to severe hypertriglyceridemia    Peptic ulcer     Rheumatoid arthritis     Vitamin D deficiency      Past Surgical History:   Procedure Laterality Date    Athroscopy(knee)      BREAST BIOPSY Left 2017    benign    CARDIAC CATHETERIZATION      CATARACT EXTRACTION Left 2013    COLONOSCOPY N/A 03/30/2016    Procedure: COLONOSCOPY;  Surgeon: Thee Sorenson MD;  Location: [facility];  Service: Endoscopy;  Laterality: N/A;    EYE SURGERY      cataracts bilateral     FRACTURE SURGERY  1997    right knee     HYSTERECTOMY  1982    KNEE ARTHROSCOPY  1996    KNEE SURGERY  1996    Scope knee    PARTIAL HYSTERECTOMY      w/ USO    TONSILLECTOMY      TRANSFORAMINAL EPIDURAL INJECTION OF STEROID Right 04/15/2019    Procedure: Injection,steroid,epidural,transforaminal approach;  Surgeon: Fernando Jenkins MD;  Location: [facility];  Service: Pain Management;  Laterality: Right;  L4-5, L5-S1    UPPER GASTROINTESTINAL ENDOSCOPY  05/2013     Family History   Problem Relation Name Age of  Onset    Thyroid disease Mother      Heart disease Mother      Emphysema Mother          smoker    Ulcers Father      Emphysema Father          smoker    Alcohol abuse Father      Colon polyps Sister  64    Alzheimer's disease Brother Doug Smith     Stroke Brother Doug Smith     Cancer Brother Doug Smith     Throat cancer Brother Doug Smith         non smoker    No Known Problems Son      Diabetes Maternal Uncle Joseph Hudson         1/2 brother    Cancer Paternal Aunt None     Early death Paternal Aunt None     Leukemia Paternal Aunt None         highschool    Breast cancer Paternal Aunt None     Heart disease Paternal Uncle      Rheum arthritis Maternal Grandmother      Pneumonia Maternal Grandfather      Cancer Paternal Grandmother None     Breast cancer Paternal Grandmother None     Early death Paternal Grandfather           killed in line of duty     Social History     Socioeconomic History    Marital status:    Occupational History    Occupation: retired [email or web address]    Tobacco Use    Smoking status: Former     Current packs/day: 0.00     Average packs/day: 1.1 packs/day for 13.9 years (15.0 ttl pk-yrs)     Types: Cigarettes     Start date: 1960     Quit date: 1972     Years since quittin.8    Smokeless tobacco: Never    Tobacco comments:     quit    Substance and Sexual Activity    Alcohol use: No    Drug use: No    Sexual activity: Not Currently     Partners: Male     Birth control/protection: Abstinence, Partner-Vasectomy, None     Social Drivers of Health     Financial Resource Strain: Low Risk  (2024)    Overall Financial Resource Strain (CARDIA)     Difficulty of Paying Living Expenses: Not very hard   Food Insecurity: No Food Insecurity (2024)    Hunger Vital Sign     Worried About Running Out of Food in the Last Year: Never true     Ran Out of Food in the Last Year: Never true   Transportation Needs: No Transportation Needs (2024)    PRAPARE -  Transportation     Lack of Transportation (Medical): No     Lack of Transportation (Non-Medical): No   Physical Activity: Inactive (8/29/2024)    Exercise Vital Sign     Days of Exercise per Week: 0 days     Minutes of Exercise per Session: 0 min   Stress: Stress Concern Present (8/29/2024)    [facility] - Occupational Stress Questionnaire     Feeling of Stress : Rather much   Housing Stability: Low Risk  (8/29/2024)    Housing Stability Vital Sign     Unable to Pay for Housing in the Last Year: No     Homeless in the Last Year: No       Current Outpatient Medications   Medication Sig Dispense Refill    albuterol (ACCUNEB) 1.25 mg/3 mL Nebu Inhale 1.25 mg into the lungs every 6 (six) hours as needed.      albuterol (PROAIR HFA) 90 mcg/actuation inhaler Inhale 2 puffs into the lungs every 6 (six) hours as needed for Wheezing. 3 Inhaler 5    artificial saliva, yerbas-lyt, SprA Use as directed 240 mL 11    aspirin 81 mg Tab Take 81 mg by mouth once daily. Every day      atenolol (TENORMIN) 100 MG tablet TAKE 1 TABLET DAILY 90 tablet 2    azelastine (ASTELIN) 137 mcg (0.1 %) nasal spray 1 spray by Nasal route 2 (two) times daily.      CALCIUM CARBONATE/VITAMIN D3 (OS-ADDY 500 + D) 500-125 mg-unit Tab Take 1 tablet by mouth once daily. Twice a day      cetirizine (ZYRTEC) 10 MG tablet Take 10 mg by mouth once daily. Every day      cholecalciferol, vitamin D3, 5,000 unit capsule Take 5,000 Units by mouth once daily. 5 a week      cyanocobalamin (VITAMIN B-12) 100 MCG tablet Take 100 mcg by mouth once daily.      diclofenac sodium (VOLTAREN) 1 % Gel Apply 2 g topically once daily.      entanercept (ENBREL) 50 mg/mL injection 50 mg once a week. once weekly      fluticasone (FLONASE) 50 mcg/actuation nasal spray 2 sprays by Each Nare route once daily. Every day 48 g 3    magnesium oxide (MAG-OX) 400 mg tablet Take 400 mg by mouth once daily.      montelukast (SINGULAIR) 10 mg tablet TAKE 1 TABLET  BY MOUTH EVERY DAY IN THE EVENING 90 tablet 3    olmesartan (BENICAR) 40 MG tablet Take 40 mg by mouth once daily.      omeprazole (PRILOSEC) 40 MG capsule TAKE 1 CAPSULE TWICE DAILY BEFORE MEALS 180 capsule 2    spironolactone (ALDACTONE) 50 MG tablet       UBIDECARENONE (COQ-10 ORAL) Take 1 capsule by mouth once daily.       zinc gluconate 50 mg tablet Take 50 mg by mouth once daily.      fluticasone furoate-vilanteroL (BREO) 100-25 mcg/dose diskus inhaler Inhale 1 puff into the lungs once daily. 60 each 2     No current facility-administered medications for this visit.     Review of patient's allergies indicates:   Allergen Reactions    Amoxicillin-pot clavulanate Diarrhea and Other (See Comments)     Sever cramping    Diltiazem Other (See Comments)     Extreme dry moth    Fenofibrate Other (See Comments)     Other reaction(s): Itching    Leflunomide      Other reaction(s): abd pains    Lisinopril-hydrochlorothiazide      Other reaction(s): diarrhea  Other reaction(s): cramps    Methotrexate      Other reaction(s): pancreatitis    Norvasc [amlodipine] Swelling    Sulfa (sulfonamide antibiotics)      Other reaction(s): Swelling  Other reaction(s): Hives    Sulfamethoxazole-trimethoprim     Trimethoprim Other (See Comments)     Unknown       Review of Systems   Musculoskeletal:  Positive for gait problem.   All other systems reviewed and are negative.      Objective:      Vitals:    10/24/24 1354   BP: (!) 138/58   Pulse: (!) 57   Resp: 18   Height: 5' (1.524 m)     Physical Exam  Vitals and nursing note reviewed.   Constitutional:       General: She is not in acute distress.     Appearance: Normal appearance.   Cardiovascular:      Pulses:           Dorsalis pedis pulses are 2+ on the right side and 2+ on the left side.        Posterior tibial pulses are 1+ on the right side and 1+ on the left side.   Pulmonary:      Effort: Pulmonary effort is normal.   Musculoskeletal:         General: Tenderness present.       Right foot: Decreased range of motion (Decreased ankle joint dorsiflexion bilateral).      Left foot: Decreased range of motion.      Comments: Pain at the medial aspect of the Achilles tendon insertion and superior to this area right foot with mild localized edema, no calor   Feet:      Right foot:      Skin integrity: Skin integrity normal.      Left foot:      Skin integrity: Skin integrity normal.   Skin:     Capillary Refill: Capillary refill takes 2 to 3 seconds.   Neurological:      General: No focal deficit present.      Mental Status: She is alert.   Psychiatric:         Thought Content: Thought content normal.                     EXAMINATION:  XR FOOT COMPLETE 3 VIEW RIGHT     CLINICAL HISTORY:  . Achilles tendinitis, right leg     TECHNIQUE:  AP, lateral, and oblique views of the right foot were performed.     COMPARISON:  01/15/2014     FINDINGS:  No acute fracture.  Several accessory ossicles in the midfoot.  There are dorsal spurs of the calcaneus, navicular, cuneiforms.  There is advanced arthropathy at several toe IP joints and midfoot TMT joints.     Query soft tissue thickening along the plantar heel near the Achilles insertion.  This could reflect Achilles tendinosis or other pathology.  MRI may add further characterization as clinically warranted.        Electronically signed by:Heriberto Duque  Date:                                            10/23/2024     Assessment:       1. Achilles tendinitis of right lower extremity    2. Posterior calcaneal spur of right foot    3. Pain of right heel        Plan:         Reviewed with patient x-rays taken previously  We discussed a large posterior calcaneal spur  Explained it has been present for many years, takes a long time to develop a spur of the size  Since she has had pain for 8 months it is unlikely the spur is the cause, however once inflammation develops in this area it is definitely a contributing factor  We discussed Achilles tendinitis,  inflammation of the Achilles tendon at the attachment and explained it attaches at the location of the spur  Advised the spur becomes problematic if there has been a change, unfortunately there is no previous x-rays of this area  Explained majority of the time inflammation at the insertion of the tendon is the cause of pain inflammation  We discussed how important it is to aggressively treat this area topically  Discussed application of ice 20 minute intervals 3 times a day, morning, noon/evening and bedtime  After the morning application apply over-the-counter Voltaren gel or prescription diclofenac.  Explained this topical anti-inflammatory very effective to reduce inflammation locally and help take tension off the Achilles tendon  After the Voltaren gel she is to apply half of 4% over-the-counter lidocaine patch  Showed patient how to apply  Lidocaine patches kept on all day, she removes it once in the middle of the day to apply ice and Voltaren gel and reapply the same patch, good for 12 hours  At night after the last application of ice and Voltaren gel apply 2nd half of the lidocaine patch so it is on all night when sleeping  Had a lengthy discussion regarding appropriate shoes  Explained tennis shoes would be better for better control/support around the back of the foot and heel  This slip-on she presents in today due take some tension off the back of the heel but do not provide control  Always have shoes on, never barefoot  No slippers or walking in sock feet  Explained by performing the same topical treatment daily to reduce inflammation and help take pressure off the tendon she should have significant improvement in 2 weeks  Pain should be resolved in 4 weeks  If not significant improvement in the next few weeks may consider an MRI, there would be concern regarding a possible partial tear of the Achilles tendon due to the length of time this has been present  Patient was in understanding and agreement with  treatment plan.  I counseled the patient on their conditions, implications and medical management.  Instructed patient to contact the office with any changes, questions, concerns, worsening of symptoms.   Total face to face time 30 minutes, exam, assessment, treatment, discussion, additional time for review of chart prior to and following appointment and visit documentation, consultation and coordination of care.    Follow up 4 weels    This note was created using M*Modal voice recognition software that occasionally misinterpreted phrases or words.

## 2024-11-06 ENCOUNTER — LAB VISIT (OUTPATIENT)
Dept: LAB | Facility: HOSPITAL | Age: 79
End: 2024-11-06
Attending: FAMILY MEDICINE
Payer: MEDICARE

## 2024-11-06 DIAGNOSIS — Z79.899 OTHER LONG TERM (CURRENT) DRUG THERAPY: ICD-10-CM

## 2024-11-06 DIAGNOSIS — Z76.89 ENCOUNTER TO ESTABLISH CARE WITH NEW DOCTOR: ICD-10-CM

## 2024-11-06 DIAGNOSIS — I10 PRIMARY HYPERTENSION: ICD-10-CM

## 2024-11-06 DIAGNOSIS — E66.01 CLASS 3 SEVERE OBESITY DUE TO EXCESS CALORIES WITH SERIOUS COMORBIDITY AND BODY MASS INDEX (BMI) OF 40.0 TO 44.9 IN ADULT: ICD-10-CM

## 2024-11-06 DIAGNOSIS — R73.03 PREDIABETES: ICD-10-CM

## 2024-11-06 DIAGNOSIS — E55.9 VITAMIN D DEFICIENCY: ICD-10-CM

## 2024-11-06 DIAGNOSIS — Z99.81 DEPENDENCE ON SUPPLEMENTAL OXYGEN: ICD-10-CM

## 2024-11-06 DIAGNOSIS — E66.813 CLASS 3 SEVERE OBESITY DUE TO EXCESS CALORIES WITH SERIOUS COMORBIDITY AND BODY MASS INDEX (BMI) OF 40.0 TO 44.9 IN ADULT: ICD-10-CM

## 2024-11-06 DIAGNOSIS — E78.2 MIXED HYPERLIPIDEMIA: Primary | ICD-10-CM

## 2024-11-06 LAB
25(OH)D3+25(OH)D2 SERPL-MCNC: 38 NG/ML (ref 30–96)
ALBUMIN SERPL BCP-MCNC: 3.5 G/DL (ref 3.5–5.2)
ALBUMIN/CREAT UR: 8.5 UG/MG (ref 0–30)
ALP SERPL-CCNC: 82 U/L (ref 40–150)
ALT SERPL W/O P-5'-P-CCNC: 14 U/L (ref 10–44)
ANION GAP SERPL CALC-SCNC: 11 MMOL/L (ref 8–16)
AST SERPL-CCNC: 15 U/L (ref 10–40)
BASOPHILS # BLD AUTO: 0.06 K/UL (ref 0–0.2)
BASOPHILS NFR BLD: 0.8 % (ref 0–1.9)
BILIRUB SERPL-MCNC: 0.8 MG/DL (ref 0.1–1)
BUN SERPL-MCNC: 22 MG/DL (ref 8–23)
CALCIUM SERPL-MCNC: 9.1 MG/DL (ref 8.7–10.5)
CHLORIDE SERPL-SCNC: 104 MMOL/L (ref 95–110)
CHOLEST SERPL-MCNC: 195 MG/DL (ref 120–199)
CHOLEST/HDLC SERPL: 4.8 {RATIO} (ref 2–5)
CO2 SERPL-SCNC: 22 MMOL/L (ref 23–29)
CREAT SERPL-MCNC: 0.9 MG/DL (ref 0.5–1.4)
CREAT UR-MCNC: 177 MG/DL (ref 15–325)
DIFFERENTIAL METHOD BLD: NORMAL
EOSINOPHIL # BLD AUTO: 0.3 K/UL (ref 0–0.5)
EOSINOPHIL NFR BLD: 3.4 % (ref 0–8)
ERYTHROCYTE [DISTWIDTH] IN BLOOD BY AUTOMATED COUNT: 13.3 % (ref 11.5–14.5)
EST. GFR  (NO RACE VARIABLE): >60 ML/MIN/1.73 M^2
ESTIMATED AVG GLUCOSE: 137 MG/DL (ref 68–131)
GLUCOSE SERPL-MCNC: 122 MG/DL (ref 70–110)
HBA1C MFR BLD: 6.4 % (ref 4–5.6)
HCT VFR BLD AUTO: 39.7 % (ref 37–48.5)
HDLC SERPL-MCNC: 41 MG/DL (ref 40–75)
HDLC SERPL: 21 % (ref 20–50)
HGB BLD-MCNC: 12.8 G/DL (ref 12–16)
IMM GRANULOCYTES # BLD AUTO: 0.02 K/UL (ref 0–0.04)
IMM GRANULOCYTES NFR BLD AUTO: 0.3 % (ref 0–0.5)
LDLC SERPL CALC-MCNC: 111.4 MG/DL (ref 63–159)
LYMPHOCYTES # BLD AUTO: 2 K/UL (ref 1–4.8)
LYMPHOCYTES NFR BLD: 25.4 % (ref 18–48)
MCH RBC QN AUTO: 30 PG (ref 27–31)
MCHC RBC AUTO-ENTMCNC: 32.2 G/DL (ref 32–36)
MCV RBC AUTO: 93 FL (ref 82–98)
MICROALBUMIN UR DL<=1MG/L-MCNC: 15 UG/ML
MONOCYTES # BLD AUTO: 0.7 K/UL (ref 0.3–1)
MONOCYTES NFR BLD: 8.9 % (ref 4–15)
NEUTROPHILS # BLD AUTO: 4.9 K/UL (ref 1.8–7.7)
NEUTROPHILS NFR BLD: 61.2 % (ref 38–73)
NONHDLC SERPL-MCNC: 154 MG/DL
NRBC BLD-RTO: 0 /100 WBC
PLATELET # BLD AUTO: 183 K/UL (ref 150–450)
PMV BLD AUTO: 10 FL (ref 9.2–12.9)
POTASSIUM SERPL-SCNC: 4.3 MMOL/L (ref 3.5–5.1)
PROT SERPL-MCNC: 7.8 G/DL (ref 6–8.4)
RBC # BLD AUTO: 4.26 M/UL (ref 4–5.4)
SODIUM SERPL-SCNC: 137 MMOL/L (ref 136–145)
TRIGL SERPL-MCNC: 213 MG/DL (ref 30–150)
TSH SERPL DL<=0.005 MIU/L-ACNC: 1.58 UIU/ML (ref 0.4–4)
VIT B12 SERPL-MCNC: 1203 PG/ML (ref 210–950)
WBC # BLD AUTO: 7.99 K/UL (ref 3.9–12.7)

## 2024-11-06 PROCEDURE — 82570 ASSAY OF URINE CREATININE: CPT | Performed by: FAMILY MEDICINE

## 2024-11-06 PROCEDURE — 84443 ASSAY THYROID STIM HORMONE: CPT | Performed by: FAMILY MEDICINE

## 2024-11-06 PROCEDURE — 36415 COLL VENOUS BLD VENIPUNCTURE: CPT | Performed by: FAMILY MEDICINE

## 2024-11-06 PROCEDURE — 82306 VITAMIN D 25 HYDROXY: CPT | Performed by: FAMILY MEDICINE

## 2024-11-06 PROCEDURE — 80053 COMPREHEN METABOLIC PANEL: CPT | Performed by: FAMILY MEDICINE

## 2024-11-06 PROCEDURE — 80061 LIPID PANEL: CPT | Performed by: FAMILY MEDICINE

## 2024-11-06 PROCEDURE — 82607 VITAMIN B-12: CPT | Performed by: FAMILY MEDICINE

## 2024-11-06 PROCEDURE — 85025 COMPLETE CBC W/AUTO DIFF WBC: CPT | Performed by: FAMILY MEDICINE

## 2024-11-06 PROCEDURE — 83036 HEMOGLOBIN GLYCOSYLATED A1C: CPT | Performed by: FAMILY MEDICINE

## 2024-11-06 RX ORDER — ICOSAPENT ETHYL 1 G/1
2 CAPSULE ORAL 2 TIMES DAILY
Qty: 360 CAPSULE | Refills: 3 | Status: SHIPPED | OUTPATIENT
Start: 2024-11-06

## 2024-11-13 ENCOUNTER — OFFICE VISIT (OUTPATIENT)
Dept: PULMONOLOGY | Facility: CLINIC | Age: 79
End: 2024-11-13
Payer: MEDICARE

## 2024-11-13 VITALS
BODY MASS INDEX: 41.75 KG/M2 | DIASTOLIC BLOOD PRESSURE: 74 MMHG | HEIGHT: 60 IN | WEIGHT: 212.63 LBS | OXYGEN SATURATION: 98 % | SYSTOLIC BLOOD PRESSURE: 136 MMHG | TEMPERATURE: 98 F | HEART RATE: 54 BPM

## 2024-11-13 DIAGNOSIS — J96.11 CHRONIC HYPOXEMIC RESPIRATORY FAILURE: Primary | ICD-10-CM

## 2024-11-13 DIAGNOSIS — M06.9 RHEUMATOID ARTHRITIS, INVOLVING UNSPECIFIED SITE, UNSPECIFIED WHETHER RHEUMATOID FACTOR PRESENT: ICD-10-CM

## 2024-11-13 DIAGNOSIS — J45.909 ASTHMA, UNSPECIFIED ASTHMA SEVERITY, UNSPECIFIED WHETHER COMPLICATED, UNSPECIFIED WHETHER PERSISTENT: ICD-10-CM

## 2024-11-13 DIAGNOSIS — G47.33 OSA (OBSTRUCTIVE SLEEP APNEA): ICD-10-CM

## 2024-11-13 PROCEDURE — 99999 PR PBB SHADOW E&M-EST. PATIENT-LVL IV: CPT | Mod: PBBFAC,,, | Performed by: NURSE PRACTITIONER

## 2024-11-13 PROCEDURE — 99213 OFFICE O/P EST LOW 20 MIN: CPT | Mod: S$PBB,,, | Performed by: NURSE PRACTITIONER

## 2024-11-13 PROCEDURE — 99214 OFFICE O/P EST MOD 30 MIN: CPT | Mod: PBBFAC,PN | Performed by: NURSE PRACTITIONER

## 2024-11-13 NOTE — PROGRESS NOTES
SUBJECTIVE:    Patient ID: Ling Rapp is a 79 y.o. female.    Chief Complaint: Follow-up (6 month follow up asthma, DANE)       Patient here today feeling well. She is sleeping on her CPAP with oxygen bled in. Her compliance report showed that she is 100% compliant sleeps an average of 9 hours and 13 minutes with an AHI of 2.3. She is using Breo every day. She feels her breathing is stable, no cough lately.  She has established with a new rheumatologist to manage her RA.   Latest CT showed resolution of right upper lobe bronchiolitis, stable appearance of non specific ILD.      Past Medical History:   Diagnosis Date    ALLERGIC RHINITIS     Anticoagulant long-term use     Asthma     Bursitis 2009    Cataract     Former smoker     GERD (gastroesophageal reflux disease)     Hemorrhoids     Hypertension     DANE (obstructive sleep apnea)     Osteopenia     Osteoporosis 2010    Pancreatitis     secondary to severe hypertriglyceridemia    Peptic ulcer     Rheumatoid arthritis     Vitamin D deficiency      Past Surgical History:   Procedure Laterality Date    Athroscopy(knee)      BREAST BIOPSY Left 2017    benign    CARDIAC CATHETERIZATION      CATARACT EXTRACTION Left 2013    COLONOSCOPY N/A 03/30/2016    Procedure: COLONOSCOPY;  Surgeon: Thee Sorenson MD;  Location: [facility];  Service: Endoscopy;  Laterality: N/A;    EYE SURGERY      cataracts bilateral     FRACTURE SURGERY  1997    right knee     HYSTERECTOMY  1982    KNEE ARTHROSCOPY  1996    KNEE SURGERY  1996    Scope knee    PARTIAL HYSTERECTOMY      w/ USO    TONSILLECTOMY      TRANSFORAMINAL EPIDURAL INJECTION OF STEROID Right 04/15/2019    Procedure: Injection,steroid,epidural,transforaminal approach;  Surgeon: Fernando Jenkins MD;  Location: [facility];  Service: Pain Management;  Laterality: Right;  L4-5, L5-S1    UPPER GASTROINTESTINAL ENDOSCOPY  05/2013     Family History   Problem Relation Name Age of Onset    Thyroid disease Mother      Heart  disease Mother      Emphysema Mother          smoker    Ulcers Father      Emphysema Father          smoker    Alcohol abuse Father      Colon polyps Sister  64    Alzheimer's disease Brother Doug Smith     Stroke Brother Doug Smith     Cancer Brother Doug Smith     Throat cancer Brother Doug Smith         non smoker    No Known Problems Son      Diabetes Maternal Uncle Joseph Hudson         1/2 brother    Cancer Paternal Aunt None     Early death Paternal Aunt None     Leukemia Paternal Aunt None         highschool    Breast cancer Paternal Aunt None     Heart disease Paternal Uncle      Rheum arthritis Maternal Grandmother      Pneumonia Maternal Grandfather      Cancer Paternal Grandmother None     Breast cancer Paternal Grandmother None     Early death Paternal Grandfather           killed in line of duty        Social History:   Marital Status:   Occupation:retired    Alcohol History:  reports no history of alcohol use.  Tobacco History:  reports that she quit smoking about 52 years ago. Her smoking use included cigarettes. She started smoking about 64 years ago. She has a 15 pack-year smoking history. She has never used smokeless tobacco.  Drug History:  reports no history of drug use.    Review of patient's allergies indicates:   Allergen Reactions    Amoxicillin-pot clavulanate Diarrhea and Other (See Comments)     Sever cramping    Diltiazem Other (See Comments)     Extreme dry moth    Fenofibrate Other (See Comments)     Other reaction(s): Itching    Hydroxychloroquine      Other reaction(s): eye accomodation problems    Leflunomide      Other reaction(s): abd pains    Lisinopril-hydrochlorothiazide      Other reaction(s): diarrhea  Other reaction(s): cramps    Methotrexate      Other reaction(s): pancreatitis    Norvasc [amlodipine] Swelling    Sulfa (sulfonamide antibiotics)      Other reaction(s): Swelling  Other reaction(s): Hives    Sulfamethoxazole-trimethoprim         Current Outpatient Medications   Medication Sig Dispense Refill    albuterol (PROAIR HFA) 90 mcg/actuation inhaler Inhale 2 puffs into the lungs every 6 (six) hours as needed for Wheezing. 3 Inhaler 5    artificial saliva, yerbas-lyt, SprA Use as directed 240 mL 11    aspirin 81 mg Tab Take 81 mg by mouth once daily. Every day      atenolol (TENORMIN) 100 MG tablet TAKE 1 TABLET DAILY 90 tablet 2    azelastine (ASTELIN) 137 mcg (0.1 %) nasal spray 1 spray by Nasal route 2 (two) times daily.      CALCIUM CARBONATE/VITAMIN D3 (OS-ADDY 500 + D) 500-125 mg-unit Tab Take 1 tablet by mouth once daily. Twice a day      cetirizine (ZYRTEC) 10 MG tablet Take 10 mg by mouth once daily. Every day      cholecalciferol, vitamin D3, 5,000 unit capsule Take 5,000 Units by mouth once daily. 5 a week      cyanocobalamin (VITAMIN B-12) 100 MCG tablet Take 100 mcg by mouth once daily.      diclofenac sodium (VOLTAREN) 1 % Gel Apply 2 g topically once daily.      entanercept (ENBREL) 50 mg/mL injection 50 mg once a week. once weekly      fluticasone (FLONASE) 50 mcg/actuation nasal spray 2 sprays by Each Nare route once daily. Every day 48 g 3    fluticasone furoate-vilanteroL (BREO) 100-25 mcg/dose diskus inhaler Inhale 1 puff into the lungs once daily. 60 each 2    magnesium oxide (MAG-OX) 400 mg tablet Take 400 mg by mouth once daily.      montelukast (SINGULAIR) 10 mg tablet TAKE 1 TABLET BY MOUTH EVERY DAY IN THE EVENING 90 tablet 3    olmesartan (BENICAR) 40 MG tablet Take 40 mg by mouth once daily.      omeprazole (PRILOSEC) 40 MG capsule TAKE 1 CAPSULE TWICE DAILY BEFORE MEALS 180 capsule 2    spironolactone (ALDACTONE) 50 MG tablet       UBIDECARENONE (COQ-10 ORAL) Take 1 capsule by mouth once daily.       zinc gluconate 50 mg tablet Take 50 mg by mouth once daily.      albuterol (ACCUNEB) 1.25 mg/3 mL Nebu Inhale 1.25 mg into the lungs every 6 (six) hours as needed. (Patient not taking: Reported on 11/13/2024)       icosapent ethyL (VASCEPA) 1 gram Cap Take 2 capsules (2,000 mg total) by mouth 2 (two) times a day. (Patient not taking: Reported on 11/13/2024) 360 capsule 3     No current facility-administered medications for this visit.       Last PFT: 11/2022 normal    Last CT 08/2024   mpression:     1. Interval resolution of previous localized bronchiolitis in the right upper lobe.  2. Generally stable appearance of scattered nonspecific interstitial lung disease.  Usual interstitial pneumonia, drug induced interstitial lung disease, smoking related interstitial lung disease, connective tissue disorder, or various other potential etiologies could have this appearance.  3. Cardiomegaly, with aortic and coronary arterial calcifications.  4. Small hiatal hernia.     General:feeling well   Eyes: Vision is good.  ENT:  Post nasal drip  Heart:: chest tightness at times   Lungs: not coughing, dyspnea with exertion stable   GI: reflux controlled with Prilosec  : No dysuria, hesitancy, or nocturia.  Musculoskeletal:joint pain ok at present time,   Skin: No lesions or rashes.  Neuro: No headaches or neuropathy.  Lymph: swelling to legs better   Psych: stressed   Endo: weight stable      OBJECTIVE:      /74 (BP Location: Left arm)   Pulse (!) 54   Temp 97.5 °F (36.4 °C)   Ht 5' (1.524 m)   Wt 96.4 kg (212 lb 9.6 oz)   SpO2 98% Comment: On 2 liters of Oxygen  BMI 41.52 kg/m²     Physical Exam  GENERAL: Older patient in no distress.  HEENT: Pupils equal and reactive. Extraocular movements intact. Nose intact.      Pharynx moist.  NECK: Supple.   HEART: Regular rate and rhythm. No murmur or gallop auscultated.  LUNGS:  Crackles to bases  ABDOMEN: obese, Bowel sounds present. Non-tender, no masses palpated.  EXTREMITIES: Normal muscle tone and joint movement, clubbed nails  LYMPHATICS: No adenopathy palpated, 1+ edema.  SKIN: Dry, intact, no lesions.   NEURO: Cranial nerves II-XII intact. Motor strength 5/5 bilaterally, upper  and lower extremities.  PSYCH: Appropriate affect.    Assessment:       1. Chronic hypoxemic respiratory failure    2. Asthma, unspecified asthma severity, unspecified whether complicated, unspecified whether persistent    3. Rheumatoid arthritis, involving unspecified site, unspecified whether rheumatoid factor present    4. DANE (obstructive sleep apnea)                    Plan:             Continue the Breo   Continue the oxygen  Keep sleeping on your CPAP with oxygen bled in   Send Dr. Cleary Ct reports

## 2025-03-06 ENCOUNTER — TELEPHONE (OUTPATIENT)
Dept: FAMILY MEDICINE | Facility: CLINIC | Age: 80
End: 2025-03-06
Payer: MEDICARE

## (undated) DEVICE — SYS LABEL CORRECT MED

## (undated) DEVICE — NDL SAFETY 25G X 1.5 ECLIPSE

## (undated) DEVICE — NDL HYPODERMIC BLUNT 18G 1.5IN

## (undated) DEVICE — GLOVE PROTEXIS PI CLASSIC 6.5

## (undated) DEVICE — NDL SPINAL 22GX5

## (undated) DEVICE — APPLICATOR CHLORAPREP CLR 10.5

## (undated) DEVICE — SYR DISP LL 5CC

## (undated) DEVICE — TUBING MINIBORE EXTENSION

## (undated) DEVICE — GLOVE PROTEXIS PI CLASSIC 6.0

## (undated) DEVICE — GLOVE PROTEXIS PI CLASSIC 7.5